# Patient Record
Sex: FEMALE | Race: BLACK OR AFRICAN AMERICAN | NOT HISPANIC OR LATINO | Employment: UNEMPLOYED | URBAN - METROPOLITAN AREA
[De-identification: names, ages, dates, MRNs, and addresses within clinical notes are randomized per-mention and may not be internally consistent; named-entity substitution may affect disease eponyms.]

---

## 2023-05-08 ENCOUNTER — LAB (OUTPATIENT)
Dept: LAB | Facility: LAB | Age: 43
End: 2023-05-08
Payer: COMMERCIAL

## 2023-05-08 ENCOUNTER — OFFICE VISIT (OUTPATIENT)
Dept: PRIMARY CARE | Facility: CLINIC | Age: 43
End: 2023-05-08
Payer: COMMERCIAL

## 2023-05-08 VITALS
HEIGHT: 69 IN | WEIGHT: 192 LBS | HEART RATE: 87 BPM | SYSTOLIC BLOOD PRESSURE: 132 MMHG | OXYGEN SATURATION: 100 % | BODY MASS INDEX: 28.44 KG/M2 | DIASTOLIC BLOOD PRESSURE: 94 MMHG | TEMPERATURE: 98 F

## 2023-05-08 DIAGNOSIS — M25.512 CHRONIC LEFT SHOULDER PAIN: ICD-10-CM

## 2023-05-08 DIAGNOSIS — E03.9 HYPOTHYROIDISM, UNSPECIFIED TYPE: ICD-10-CM

## 2023-05-08 DIAGNOSIS — I10 PRIMARY HYPERTENSION: Primary | ICD-10-CM

## 2023-05-08 DIAGNOSIS — R73.03 PREDIABETES: ICD-10-CM

## 2023-05-08 DIAGNOSIS — E03.8 OTHER SPECIFIED HYPOTHYROIDISM: ICD-10-CM

## 2023-05-08 DIAGNOSIS — J38.3 VOCAL CORD DYSFUNCTION: ICD-10-CM

## 2023-05-08 DIAGNOSIS — G89.29 CHRONIC LEFT SHOULDER PAIN: ICD-10-CM

## 2023-05-08 LAB
ALANINE AMINOTRANSFERASE (SGPT) (U/L) IN SER/PLAS: 10 U/L (ref 7–45)
ALBUMIN (G/DL) IN SER/PLAS: 4.7 G/DL (ref 3.4–5)
ALKALINE PHOSPHATASE (U/L) IN SER/PLAS: 109 U/L (ref 33–110)
ANION GAP IN SER/PLAS: 13 MMOL/L (ref 10–20)
ASPARTATE AMINOTRANSFERASE (SGOT) (U/L) IN SER/PLAS: 14 U/L (ref 9–39)
BILIRUBIN TOTAL (MG/DL) IN SER/PLAS: 0.2 MG/DL (ref 0–1.2)
CALCIUM (MG/DL) IN SER/PLAS: 9.7 MG/DL (ref 8.6–10.6)
CARBON DIOXIDE, TOTAL (MMOL/L) IN SER/PLAS: 29 MMOL/L (ref 21–32)
CHLORIDE (MMOL/L) IN SER/PLAS: 103 MMOL/L (ref 98–107)
CREATININE (MG/DL) IN SER/PLAS: 0.87 MG/DL (ref 0.5–1.05)
ESTIMATED AVERAGE GLUCOSE FOR HBA1C: 105 MG/DL
GFR FEMALE: 85 ML/MIN/1.73M2
GLUCOSE (MG/DL) IN SER/PLAS: 89 MG/DL (ref 74–99)
HEMOGLOBIN A1C/HEMOGLOBIN TOTAL IN BLOOD: 5.3 %
POTASSIUM (MMOL/L) IN SER/PLAS: 4.1 MMOL/L (ref 3.5–5.3)
PROTEIN TOTAL: 7.4 G/DL (ref 6.4–8.2)
SODIUM (MMOL/L) IN SER/PLAS: 141 MMOL/L (ref 136–145)
THYROTROPIN (MIU/L) IN SER/PLAS BY DETECTION LIMIT <= 0.05 MIU/L: 2.42 MIU/L (ref 0.44–3.98)
UREA NITROGEN (MG/DL) IN SER/PLAS: 12 MG/DL (ref 6–23)

## 2023-05-08 PROCEDURE — 3080F DIAST BP >= 90 MM HG: CPT | Performed by: STUDENT IN AN ORGANIZED HEALTH CARE EDUCATION/TRAINING PROGRAM

## 2023-05-08 PROCEDURE — 36415 COLL VENOUS BLD VENIPUNCTURE: CPT

## 2023-05-08 PROCEDURE — 84443 ASSAY THYROID STIM HORMONE: CPT

## 2023-05-08 PROCEDURE — 99214 OFFICE O/P EST MOD 30 MIN: CPT | Performed by: STUDENT IN AN ORGANIZED HEALTH CARE EDUCATION/TRAINING PROGRAM

## 2023-05-08 PROCEDURE — 83036 HEMOGLOBIN GLYCOSYLATED A1C: CPT

## 2023-05-08 PROCEDURE — 83718 ASSAY OF LIPOPROTEIN: CPT

## 2023-05-08 PROCEDURE — 82465 ASSAY BLD/SERUM CHOLESTEROL: CPT

## 2023-05-08 PROCEDURE — 3075F SYST BP GE 130 - 139MM HG: CPT | Performed by: STUDENT IN AN ORGANIZED HEALTH CARE EDUCATION/TRAINING PROGRAM

## 2023-05-08 PROCEDURE — 80053 COMPREHEN METABOLIC PANEL: CPT

## 2023-05-08 RX ORDER — ACETAMINOPHEN 500 MG
1000 TABLET ORAL EVERY 8 HOURS PRN
Qty: 360 TABLET | Refills: 3 | Status: SHIPPED | OUTPATIENT
Start: 2023-05-08 | End: 2023-08-11 | Stop reason: SDUPTHER

## 2023-05-08 RX ORDER — METHYLPHENIDATE HYDROCHLORIDE 18 MG/1
18 TABLET ORAL EVERY MORNING
COMMUNITY
Start: 2023-05-05 | End: 2023-08-11

## 2023-05-08 RX ORDER — MULTIVITAMIN WITH FOLIC ACID 400 MCG
1 TABLET ORAL DAILY
COMMUNITY
Start: 2023-02-06 | End: 2023-08-11 | Stop reason: SDUPTHER

## 2023-05-08 RX ORDER — OMEPRAZOLE 40 MG/1
40 CAPSULE, DELAYED RELEASE ORAL
COMMUNITY
End: 2023-08-11 | Stop reason: SDUPTHER

## 2023-05-08 RX ORDER — LURASIDONE HYDROCHLORIDE 80 MG/1
80 TABLET, FILM COATED ORAL
COMMUNITY
Start: 2023-05-05 | End: 2023-11-09 | Stop reason: WASHOUT

## 2023-05-08 RX ORDER — LAMOTRIGINE 200 MG/1
200 TABLET ORAL 2 TIMES DAILY
COMMUNITY
End: 2023-12-13 | Stop reason: SDUPTHER

## 2023-05-08 RX ORDER — BUPROPION HYDROCHLORIDE 300 MG/1
1 TABLET ORAL
COMMUNITY
Start: 2016-03-02 | End: 2023-11-15 | Stop reason: SDUPTHER

## 2023-05-08 RX ORDER — DICLOFENAC SODIUM 10 MG/G
4 GEL TOPICAL 4 TIMES DAILY
Qty: 450 G | Refills: 3 | Status: SHIPPED | OUTPATIENT
Start: 2023-05-08 | End: 2023-12-13 | Stop reason: WASHOUT

## 2023-05-08 RX ORDER — CLONAZEPAM 0.5 MG/1
0.5 TABLET ORAL DAILY
COMMUNITY
End: 2023-08-11

## 2023-05-08 RX ORDER — CHOLECALCIFEROL (VITAMIN D3) 125 MCG
1 CAPSULE ORAL DAILY
COMMUNITY
Start: 2018-08-02 | End: 2023-08-11 | Stop reason: SDUPTHER

## 2023-05-08 RX ORDER — BENZTROPINE MESYLATE 1 MG/1
1 TABLET ORAL 2 TIMES DAILY PRN
COMMUNITY
Start: 2019-06-17 | End: 2023-11-09 | Stop reason: WASHOUT

## 2023-05-08 RX ORDER — LEVOTHYROXINE SODIUM 150 UG/1
150 TABLET ORAL
COMMUNITY
End: 2023-09-14 | Stop reason: SDUPTHER

## 2023-05-08 RX ORDER — AMLODIPINE BESYLATE 10 MG/1
10 TABLET ORAL DAILY
COMMUNITY
End: 2023-07-20 | Stop reason: SDUPTHER

## 2023-05-08 ASSESSMENT — PAIN SCALES - GENERAL: PAINLEVEL: 0-NO PAIN

## 2023-05-12 LAB
CHOLESTEROL (MG/DL) IN SER/PLAS: 212 MG/DL (ref 0–199)
CHOLESTEROL IN HDL (MG/DL) IN SER/PLAS: 55.4 MG/DL
CHOLESTEROL/HDL RATIO: 3.8
NON-HDL CHOLESTEROL: 157 MG/DL

## 2023-05-12 NOTE — PROGRESS NOTES
I saw and evaluated the patient. I personally obtained the key and critical portions of the history and physical exam or was physically present for key and critical portions performed by the resident/fellow. I reviewed the resident/fellow's documentation and discussed the patient with the resident/fellow. I agree with the resident/fellow's medical decision making as documented in the note.    Juan M Penny MD

## 2023-05-12 NOTE — PROGRESS NOTES
"Subjective   Patient ID: December Danny is a 43 y.o. female who presents for Follow-up.    HPI     Ms. Delgado is a 43F with PMH of essential HTN, pre-diabetes, h/o thyroidectomy for malignancy with post-operative hypothyroidism, multiple psychiatric diagnoses, here for follow-up of HTN and L shoulder pain     # HTN  - /94 today (much better BP control than last visit)      - Has not been checking BP at home  - Tries to maintain healthy lifestyle but has several recent stressors (particularly son with behavioral problems at school)  - No headache, vision changes, dyspnea, edema     # L Shoulder Pain  - Several month aching pain, 6-8/10, constant pain from L shoulder radiating down  - Denies F,C,N,V,D,SOB,CP  - Nothing helps, diclofenac provides only mild relief     # GERD  - Improved with diet and lifestyle modifications and omeprazole PRN    Review of Systems   All other systems reviewed and are negative.      Objective   BP (!) 132/94 (BP Location: Right arm, Patient Position: Sitting)   Pulse 87   Temp 36.7 °C (98 °F)   Ht 1.74 m (5' 8.5\")   Wt 87.1 kg (192 lb)   SpO2 100%   BMI 28.77 kg/m²     Physical Exam  Constitutional:       Appearance: Normal appearance.   HENT:      Head: Normocephalic and atraumatic.      Nose: Nose normal.   Eyes:      Extraocular Movements: Extraocular movements intact.      Conjunctiva/sclera: Conjunctivae normal.   Cardiovascular:      Rate and Rhythm: Normal rate and regular rhythm.      Pulses: Normal pulses.      Heart sounds: Normal heart sounds.   Pulmonary:      Effort: Pulmonary effort is normal.      Breath sounds: Normal breath sounds.   Abdominal:      General: Abdomen is flat.      Palpations: Abdomen is soft.   Musculoskeletal:         General: Normal range of motion.      Cervical back: Normal range of motion and neck supple.   Skin:     General: Skin is warm and dry.   Neurological:      General: No focal deficit present.      Mental Status: She is alert and " oriented to person, place, and time. Mental status is at baseline.   Psychiatric:         Mood and Affect: Mood normal.         Behavior: Behavior normal.       Assessment/Plan     Ms. Delgado is a 44 yo F with PMH of essential HTN, pre-diabetes, h/o thyroidectomy for malignancy with post-operative hypothyroidism, multiple psychiatric diagnoses, here for follow-up of HTN and shoulder pain.     # HTN  - Continue amlodipine 10mg once daily  - CMP ordered today to monitor electrolytes and renal function     # L shoulder pain  - Shoulder x-ray ordered  - Refilled Tylenol extra strength 1000mg TID  - Refilled diclofenac 1% gel  - Referral ordered for Orthopedic Surgery     #GERD  - continue omeprazole for GERD     #Post-operative hypothyroidism  #Vocal cord dysfunction  - continue levothyroxine at current dose for now  - TSH with free T4 ordered today  - Referral to endocrinology for further management     #Vitamin D Deficiency  - continue vitamin D 5000IU    # Pre-diabetes  - HA1c 11/2021 at 5.2%  - Repeat HbA1C ordered today  - previously on metformin but has not been taking     #HM  - Non-fasting lipid panel ordered today     Plan to follow-up in 2-3 weeks     Patient seen and discussed with Dr. Zohaib Coley MD  Family and Preventive Medicine  PGY-3

## 2023-05-25 DIAGNOSIS — E78.5 HYPERLIPIDEMIA, UNSPECIFIED HYPERLIPIDEMIA TYPE: Primary | ICD-10-CM

## 2023-05-25 RX ORDER — ATORVASTATIN CALCIUM 40 MG/1
40 TABLET, FILM COATED ORAL DAILY
Qty: 90 TABLET | Refills: 3 | Status: SHIPPED | OUTPATIENT
Start: 2023-05-25 | End: 2024-05-24

## 2023-07-20 DIAGNOSIS — I10 PRIMARY HYPERTENSION: Primary | ICD-10-CM

## 2023-07-20 RX ORDER — AMLODIPINE BESYLATE 10 MG/1
10 TABLET ORAL DAILY
Qty: 30 TABLET | Refills: 0 | Status: SHIPPED | OUTPATIENT
Start: 2023-07-20 | End: 2023-09-05 | Stop reason: SDUPTHER

## 2023-08-19 PROBLEM — M25.512 CHRONIC LEFT SHOULDER PAIN: Status: ACTIVE | Noted: 2023-08-19

## 2023-08-19 PROBLEM — K21.9 GASTROESOPHAGEAL REFLUX DISEASE WITHOUT ESOPHAGITIS: Status: ACTIVE | Noted: 2023-08-19

## 2023-08-19 PROBLEM — E55.9 VITAMIN D DEFICIENCY: Status: ACTIVE | Noted: 2023-08-19

## 2023-08-19 PROBLEM — G89.29 CHRONIC LEFT SHOULDER PAIN: Status: ACTIVE | Noted: 2023-08-19

## 2023-08-19 PROBLEM — R09.89 CHEST CONGESTION: Status: ACTIVE | Noted: 2023-08-19

## 2023-09-05 DIAGNOSIS — I10 PRIMARY HYPERTENSION: ICD-10-CM

## 2023-09-13 RX ORDER — AMLODIPINE BESYLATE 10 MG/1
10 TABLET ORAL DAILY
Qty: 90 TABLET | Refills: 3 | Status: SHIPPED | OUTPATIENT
Start: 2023-09-13 | End: 2024-09-12

## 2023-09-13 NOTE — TELEPHONE ENCOUNTER
Rx Refill Request Telephone Encounter    Name:    :  [id number]  Medication Name:  Levothyroxine            Specific Pharmacy location:  [facility]  Date of last appointment:  2023  Date of next appointment:  10/27/2023  Best number to reach patient:  [phone]      Patient would like a call back

## 2023-09-14 DIAGNOSIS — E03.9 HYPOTHYROIDISM, UNSPECIFIED TYPE: Primary | ICD-10-CM

## 2023-09-14 RX ORDER — LEVOTHYROXINE SODIUM 150 UG/1
150 TABLET ORAL
Qty: 30 TABLET | Refills: 1 | Status: SHIPPED | OUTPATIENT
Start: 2023-09-14 | End: 2023-09-15 | Stop reason: SDUPTHER

## 2023-09-14 RX ORDER — LEVOTHYROXINE SODIUM 150 UG/1
150 TABLET ORAL
OUTPATIENT
Start: 2023-09-14

## 2023-09-14 NOTE — PROGRESS NOTES
Refilled synthroid 60 day supply. Needs more frequent follow up visits to work on her many chronic conditions.

## 2023-09-15 DIAGNOSIS — E03.9 HYPOTHYROIDISM, UNSPECIFIED TYPE: ICD-10-CM

## 2023-09-15 RX ORDER — LEVOTHYROXINE SODIUM 150 UG/1
150 TABLET ORAL
Qty: 30 TABLET | Refills: 1 | Status: SHIPPED | OUTPATIENT
Start: 2023-09-15 | End: 2023-11-13

## 2023-09-17 DIAGNOSIS — E03.9 HYPOTHYROIDISM, UNSPECIFIED TYPE: ICD-10-CM

## 2023-09-18 RX ORDER — LEVOTHYROXINE SODIUM 150 UG/1
TABLET ORAL
Qty: 90 TABLET | OUTPATIENT
Start: 2023-09-18

## 2023-09-28 ENCOUNTER — LAB (OUTPATIENT)
Dept: LAB | Facility: LAB | Age: 43
End: 2023-09-28
Payer: COMMERCIAL

## 2023-09-28 ENCOUNTER — OFFICE VISIT (OUTPATIENT)
Dept: PRIMARY CARE | Facility: CLINIC | Age: 43
End: 2023-09-28
Payer: COMMERCIAL

## 2023-09-28 VITALS
TEMPERATURE: 98.6 F | DIASTOLIC BLOOD PRESSURE: 86 MMHG | BODY MASS INDEX: 26.36 KG/M2 | SYSTOLIC BLOOD PRESSURE: 124 MMHG | HEIGHT: 69 IN | HEART RATE: 86 BPM | WEIGHT: 178 LBS | OXYGEN SATURATION: 98 %

## 2023-09-28 DIAGNOSIS — E03.9 HYPOTHYROIDISM, UNSPECIFIED TYPE: Primary | ICD-10-CM

## 2023-09-28 DIAGNOSIS — E03.9 HYPOTHYROIDISM, UNSPECIFIED TYPE: ICD-10-CM

## 2023-09-28 DIAGNOSIS — R42 DIZZINESS: ICD-10-CM

## 2023-09-28 DIAGNOSIS — M25.512 LEFT SHOULDER PAIN, UNSPECIFIED CHRONICITY: ICD-10-CM

## 2023-09-28 LAB
THYROTROPIN (MIU/L) IN SER/PLAS BY DETECTION LIMIT <= 0.05 MIU/L: 3.64 MIU/L (ref 0.44–3.98)
THYROXINE (T4) FREE (NG/DL) IN SER/PLAS: 0.86 NG/DL (ref 0.78–1.48)

## 2023-09-28 PROCEDURE — 36415 COLL VENOUS BLD VENIPUNCTURE: CPT

## 2023-09-28 PROCEDURE — 84443 ASSAY THYROID STIM HORMONE: CPT

## 2023-09-28 PROCEDURE — 99214 OFFICE O/P EST MOD 30 MIN: CPT

## 2023-09-28 PROCEDURE — 84439 ASSAY OF FREE THYROXINE: CPT

## 2023-09-28 RX ORDER — MULTIVITAMIN
1 TABLET ORAL DAILY
COMMUNITY
Start: 2023-08-17 | End: 2024-05-20 | Stop reason: ALTCHOICE

## 2023-09-28 RX ORDER — BETAMETHASONE DIPROPIONATE 0.5 MG/G
OINTMENT TOPICAL
COMMUNITY
Start: 2019-04-17 | End: 2024-05-20 | Stop reason: ALTCHOICE

## 2023-09-28 RX ORDER — LIDOCAINE 560 MG/1
PATCH PERCUTANEOUS; TOPICAL; TRANSDERMAL
COMMUNITY
Start: 2022-05-23 | End: 2024-05-20 | Stop reason: ALTCHOICE

## 2023-09-28 RX ORDER — MECLIZINE HCL 12.5 MG 12.5 MG/1
12.5 TABLET ORAL 3 TIMES DAILY PRN
Qty: 30 TABLET | Refills: 11 | Status: SHIPPED | OUTPATIENT
Start: 2023-09-28 | End: 2023-12-13 | Stop reason: WASHOUT

## 2023-09-28 RX ORDER — ONDANSETRON 4 MG/1
4 TABLET, ORALLY DISINTEGRATING ORAL EVERY 4 HOURS PRN
COMMUNITY
Start: 2015-09-04 | End: 2024-05-20 | Stop reason: ALTCHOICE

## 2023-09-28 RX ORDER — UREA 40 %
CREAM (GRAM) TOPICAL
COMMUNITY
Start: 2020-05-22 | End: 2024-05-20 | Stop reason: ALTCHOICE

## 2023-09-28 RX ORDER — DICYCLOMINE HYDROCHLORIDE 20 MG/1
20 TABLET ORAL 4 TIMES DAILY
COMMUNITY
Start: 2015-09-04 | End: 2024-01-29

## 2023-09-28 RX ORDER — LIDOCAINE 50 MG/G
1 PATCH TOPICAL DAILY
Qty: 30 PATCH | Refills: 11 | Status: SHIPPED | OUTPATIENT
Start: 2023-09-28 | End: 2023-12-13 | Stop reason: WASHOUT

## 2023-09-28 RX ORDER — CYCLOBENZAPRINE HCL 5 MG
1 TABLET ORAL EVERY 8 HOURS PRN
COMMUNITY
Start: 2022-09-30 | End: 2024-01-29

## 2023-09-28 RX ORDER — BUSPIRONE HYDROCHLORIDE 5 MG/1
5 TABLET ORAL 2 TIMES DAILY
COMMUNITY
Start: 2023-09-20 | End: 2023-10-07

## 2023-09-28 RX ORDER — FLUTICASONE PROPIONATE 50 MCG
2 SPRAY, SUSPENSION (ML) NASAL DAILY
COMMUNITY
Start: 2018-08-06 | End: 2024-05-20 | Stop reason: ALTCHOICE

## 2023-09-28 RX ORDER — PERPHENAZINE 4 MG/1
4 TABLET ORAL
COMMUNITY
End: 2023-11-09 | Stop reason: WASHOUT

## 2023-09-28 RX ORDER — MUPIROCIN 20 MG/G
OINTMENT TOPICAL DAILY
COMMUNITY
Start: 2022-10-09 | End: 2024-05-20 | Stop reason: ALTCHOICE

## 2023-09-28 ASSESSMENT — ENCOUNTER SYMPTOMS
WOUND: 0
CHILLS: 1
EYE DISCHARGE: 1
FEVER: 0
NECK STIFFNESS: 0
PALPITATIONS: 0
NERVOUS/ANXIOUS: 1
COUGH: 0
WHEEZING: 0
UNEXPECTED WEIGHT CHANGE: 1
DIFFICULTY URINATING: 0
ABDOMINAL PAIN: 0
AGITATION: 1
NECK PAIN: 0
ABDOMINAL DISTENTION: 0
FATIGUE: 1
DIARRHEA: 0
NAUSEA: 1
MYALGIAS: 1
CONSTIPATION: 1
EYE ITCHING: 1
HEADACHES: 1
SINUS PRESSURE: 0
DYSURIA: 0
DIZZINESS: 1
DIAPHORESIS: 0
EYE PAIN: 1
RHINORRHEA: 0
SINUS PAIN: 0
VOMITING: 1
LIGHT-HEADEDNESS: 1
SHORTNESS OF BREATH: 0
WEAKNESS: 0
HEMATURIA: 0
NUMBNESS: 0
CHEST TIGHTNESS: 1

## 2023-09-28 ASSESSMENT — PAIN SCALES - GENERAL: PAINLEVEL: 0-NO PAIN

## 2023-10-05 ENCOUNTER — TELEPHONE (OUTPATIENT)
Dept: OTHER | Age: 43
End: 2023-10-05
Payer: COMMERCIAL

## 2023-10-05 ENCOUNTER — TELEPHONE (OUTPATIENT)
Dept: BEHAVIORAL HEALTH | Facility: CLINIC | Age: 43
End: 2023-10-05
Payer: COMMERCIAL

## 2023-10-05 NOTE — PROGRESS NOTES
Patient called and reported had vertigo from dizziness , entered allergy in emr [facility] and removed med from list of medications anya [facility] cns     524 pm , spoke with patient and she says she stopped buspar after she started having dizziness and never resumed it , she still has episodes of dizziness . Discussed twice a week blood pressure monitoring before taking am meds and in the afternoon , discussed bupropion xl may cause elevated blood pressure . She denies having symptoms of illness currently. She is concerned about not wanting to reduce bupropion xl , however she had a period off meds , and we discussed she may need to stay at a lower dose , she will monitor this and for now will continue the same dose , she has been on this medication with no issues prior to now , but she understands this may need to be reduced . Also encouraged her to speak to the pcp office again to update them about the continued dizziness. [facility]

## 2023-10-06 ENCOUNTER — APPOINTMENT (OUTPATIENT)
Dept: PRIMARY CARE | Facility: CLINIC | Age: 43
End: 2023-10-06
Payer: COMMERCIAL

## 2023-10-07 NOTE — PROGRESS NOTES
Telephone note :Possible med reaction to buspar , patient reported dizziness and was evaluated in the er , reviewed notes in [facility] emr , encouraged follow up with pcp as she still has dizziness . She will go to the er if this worsens or other acute concerns develop . Notated dc buspar in chart .

## 2023-10-09 PROBLEM — F41.9 ANXIETY DISORDER: Status: ACTIVE | Noted: 2023-10-09

## 2023-10-09 PROBLEM — F41.1 GAD (GENERALIZED ANXIETY DISORDER): Status: ACTIVE | Noted: 2023-10-09

## 2023-10-09 PROBLEM — F43.9 TRAUMA AND STRESSOR-RELATED DISORDER: Status: ACTIVE | Noted: 2023-10-09

## 2023-10-09 PROBLEM — R07.9 CHEST PAIN: Status: ACTIVE | Noted: 2023-10-09

## 2023-10-09 PROBLEM — I10 HYPERTENSION, ESSENTIAL: Status: ACTIVE | Noted: 2023-10-09

## 2023-10-09 PROBLEM — R25.1 TREMOR: Status: ACTIVE | Noted: 2023-10-09

## 2023-10-09 PROBLEM — M77.41 METATARSALGIA OF BOTH FEET: Status: ACTIVE | Noted: 2023-10-09

## 2023-10-09 PROBLEM — R94.6 ABNORMAL THYROID FUNCTION TEST: Status: ACTIVE | Noted: 2023-10-09

## 2023-10-09 PROBLEM — F98.8 ADD (ATTENTION DEFICIT DISORDER): Status: ACTIVE | Noted: 2023-10-09

## 2023-10-09 PROBLEM — B35.1 ONYCHOMYCOSIS: Status: ACTIVE | Noted: 2023-10-09

## 2023-10-09 PROBLEM — L65.9 HAIR LOSS: Status: ACTIVE | Noted: 2023-10-09

## 2023-10-09 PROBLEM — M25.512 LEFT SHOULDER PAIN: Status: ACTIVE | Noted: 2023-10-09

## 2023-10-09 PROBLEM — R49.0 HOARSENESS OF VOICE: Status: ACTIVE | Noted: 2023-10-09

## 2023-10-09 PROBLEM — M77.42 METATARSALGIA OF BOTH FEET: Status: ACTIVE | Noted: 2023-10-09

## 2023-10-09 PROBLEM — L63.9 ALOPECIA AREATA, UNSPECIFIED: Status: ACTIVE | Noted: 2019-07-03

## 2023-10-09 PROBLEM — F32.A DEPRESSION: Status: ACTIVE | Noted: 2023-10-09

## 2023-10-09 PROBLEM — H53.9 VISION CHANGES: Status: ACTIVE | Noted: 2023-10-09

## 2023-10-09 PROBLEM — E08.00: Status: ACTIVE | Noted: 2023-10-09

## 2023-10-09 PROBLEM — L84 PRE-ULCERATIVE CALLUSES: Status: ACTIVE | Noted: 2023-10-09

## 2023-10-09 PROBLEM — E89.0 POSTOPERATIVE HYPOTHYROIDISM: Status: ACTIVE | Noted: 2023-10-09

## 2023-10-09 PROBLEM — L84 CALLUS OF FOOT: Status: ACTIVE | Noted: 2023-10-09

## 2023-10-09 PROBLEM — Z85.850 HISTORY OF MALIGNANT NEOPLASM OF THYROID: Status: ACTIVE | Noted: 2023-10-09

## 2023-10-09 PROBLEM — G47.9 SLEEP DIFFICULTIES: Status: ACTIVE | Noted: 2023-10-09

## 2023-10-09 PROBLEM — Z86.39: Status: ACTIVE | Noted: 2023-10-09

## 2023-10-09 PROBLEM — R53.83 FATIGUE: Status: ACTIVE | Noted: 2023-10-09

## 2023-10-09 RX ORDER — LURASIDONE HYDROCHLORIDE 40 MG/1
TABLET, FILM COATED ORAL
COMMUNITY
End: 2023-11-09 | Stop reason: WASHOUT

## 2023-10-09 RX ORDER — BENZTROPINE MESYLATE 0.5 MG/1
0.5 TABLET ORAL 2 TIMES DAILY
COMMUNITY
Start: 2019-09-13 | End: 2023-12-13 | Stop reason: SDUPTHER

## 2023-10-09 RX ORDER — BUPROPION HYDROCHLORIDE 150 MG/1
150 TABLET, EXTENDED RELEASE ORAL EVERY MORNING
COMMUNITY
End: 2023-11-15 | Stop reason: WASHOUT

## 2023-10-09 RX ORDER — LEVOTHYROXINE SODIUM 137 UG/1
TABLET ORAL
COMMUNITY
Start: 2019-08-21 | End: 2023-11-13 | Stop reason: SDUPTHER

## 2023-10-09 RX ORDER — LAMOTRIGINE 150 MG/1
TABLET ORAL
COMMUNITY
End: 2023-11-09 | Stop reason: WASHOUT

## 2023-10-09 RX ORDER — LEVOTHYROXINE SODIUM 125 UG/1
TABLET ORAL
COMMUNITY
End: 2023-11-13 | Stop reason: SDUPTHER

## 2023-10-09 RX ORDER — LURASIDONE HYDROCHLORIDE 120 MG/1
TABLET, FILM COATED ORAL
COMMUNITY
Start: 2023-02-24 | End: 2023-11-15 | Stop reason: SDUPTHER

## 2023-10-09 RX ORDER — LURASIDONE HYDROCHLORIDE 60 MG/1
TABLET, FILM COATED ORAL
COMMUNITY
Start: 2023-01-02 | End: 2023-11-09 | Stop reason: WASHOUT

## 2023-10-09 RX ORDER — CHLORHEXIDINE GLUCONATE ORAL RINSE 1.2 MG/ML
SOLUTION DENTAL
COMMUNITY
End: 2024-05-20 | Stop reason: ALTCHOICE

## 2023-10-09 RX ORDER — AMMONIUM LACTATE 12 G/100G
CREAM TOPICAL
COMMUNITY
End: 2024-05-20 | Stop reason: ALTCHOICE

## 2023-10-10 ENCOUNTER — TELEMEDICINE (OUTPATIENT)
Dept: BEHAVIORAL HEALTH | Facility: CLINIC | Age: 43
End: 2023-10-10
Payer: COMMERCIAL

## 2023-10-10 DIAGNOSIS — F90.0 ATTENTION DEFICIT HYPERACTIVITY DISORDER (ADHD), PREDOMINANTLY INATTENTIVE TYPE: ICD-10-CM

## 2023-10-10 DIAGNOSIS — F32.0 CURRENT MILD EPISODE OF MAJOR DEPRESSIVE DISORDER, UNSPECIFIED WHETHER RECURRENT (CMS-HCC): ICD-10-CM

## 2023-10-10 DIAGNOSIS — F25.0 SCHIZOAFFECTIVE DISORDER, BIPOLAR TYPE (MULTI): ICD-10-CM

## 2023-10-10 PROCEDURE — 90834 PSYTX W PT 45 MINUTES: CPT | Performed by: PSYCHOLOGIST

## 2023-10-11 NOTE — PROGRESS NOTES
"  1 PM [address] Ind[facility] Psych for Schizoaffective Dx, Depression, ADHD (45 MIN, 107-148)  Virtual. Spent most of time discussed her level of frustration. Prior to call, oldest son said f-you to her and said there were no groceries after she'd just got food, spending $300 on groceries. Has been having difficulty managing them. \"Tired of kids, tired of sitting around here, harder to keep the farzana, accept this reality.\" Furnace out, trying to get fixed but property mgt company slow. Trying to line up a therapist for oldest son. Also wants to do something with her life. Frustrated with Tavon's therapist who comes two times per week. \"She's puling me and oldest into these sessions.\" \"Trying to make us sit for two hours.\" Therapy goes until May, is not seeing it help make changes on son's behavior. Has asked about residential treatment at [facility], therapist says not eligible. Has inquired about putting him into a group home but again, told not eligible. Has had two calls from school over Tavon's behavior. Trying to involved him in artistic endeavors from [employer]. Believes he may be little calmer on current meds. Tavon, still having GI problems, with constipation, impacted then accidents. Plans on reaching out again to GI specialist. Daughter called and said she needed winter coat. Showed up and got but neer even said hello to her. Has had talk with her mother, daughter telling her mother she doesn't want birth control. Patient is upset with this. Said her daughter is \"slightly retarded\" and doesn't see her capable of making her own decision, doesn't want her to get pregnant, anticipating she'll become sexually active soon, \"will be disaster in the making.\" Frustrated with kids being \"disrespectful.\" Trying to tap into all community resources she can. Recently able to get some help from food bank. Is looking into services at [place] but Tavon, at 11, may be too old. Said he's an \"habitual liar.\" " Thinks oldest son has bipolar disorder. Oldest son is not med compliant. Is overwhelmed and feels she has no life of her own. Discussed importance of taking advantage of time away from kids. Considering part-time work. Next: 2 weeks.

## 2023-10-24 ENCOUNTER — TELEMEDICINE (OUTPATIENT)
Dept: BEHAVIORAL HEALTH | Facility: CLINIC | Age: 43
End: 2023-10-24
Payer: COMMERCIAL

## 2023-10-24 DIAGNOSIS — F25.0 SCHIZOAFFECTIVE DISORDER, BIPOLAR TYPE (MULTI): ICD-10-CM

## 2023-10-24 PROCEDURE — 90834 PSYTX W PT 45 MINUTES: CPT | Performed by: PSYCHOLOGIST

## 2023-10-24 NOTE — PROGRESS NOTES
1PM 71572 Ind[facility] Psych for Schizoaffective Dx, Bipolar Type (45 MIN, 106-155)  Virtual. Still trying to figure out My Chart/Epic virtual platform, as a result, did Zoom. Spent most of time discussing problems with her kids. Had two parent teacher conferences for sons. Frustrated with oldest son who's not doing work and lying to her about it. He has a 504 plan. Son, Tavon, getting less behavior concerns at school. Frustration with daughter who wants to get a tattoo, still at grandmother's. Patient's bf has broken down truck, relying on paying a friend to help. Needs to get meds refilled. Next: 2 weeks.

## 2023-10-25 ENCOUNTER — APPOINTMENT (OUTPATIENT)
Dept: BEHAVIORAL HEALTH | Facility: CLINIC | Age: 43
End: 2023-10-25
Payer: COMMERCIAL

## 2023-10-25 ENCOUNTER — TELEMEDICINE (OUTPATIENT)
Dept: BEHAVIORAL HEALTH | Facility: CLINIC | Age: 43
End: 2023-10-25
Payer: COMMERCIAL

## 2023-10-27 ENCOUNTER — APPOINTMENT (OUTPATIENT)
Dept: PRIMARY CARE | Facility: CLINIC | Age: 43
End: 2023-10-27
Payer: COMMERCIAL

## 2023-11-07 ENCOUNTER — TELEMEDICINE (OUTPATIENT)
Dept: BEHAVIORAL HEALTH | Facility: CLINIC | Age: 43
End: 2023-11-07
Payer: COMMERCIAL

## 2023-11-07 DIAGNOSIS — F25.0 SCHIZOAFFECTIVE DISORDER, BIPOLAR TYPE (MULTI): ICD-10-CM

## 2023-11-07 PROCEDURE — 90834 PSYTX W PT 45 MINUTES: CPT | Performed by: PSYCHOLOGIST

## 2023-11-07 NOTE — PROGRESS NOTES
"3PM [address]9086 Indiv Psych for Schizoaffective Dx, Bipolar Type (45 MIN, [phone])  Virtual. Feeling better overall, more in control self and mood better. Still, plans on talking to pharmacologist and \"tweaking\" meds. Youngest son, not getting negative behavior reports from school. Home therapist, \"happy, happy\", continues to come twice per week, will be ongoing through May, 2024. Little communication with daughter, who's staying with her mom. Frustrated with daughter. Found that her mother was in the hospital 1.5 weeks but she's unsure of the reasons. Wants to try and stay active this fall/winter, \"bad part of year for me\", seasonal component to mood. Next: 3 weeks.   "

## 2023-11-09 ENCOUNTER — TELEMEDICINE (OUTPATIENT)
Dept: BEHAVIORAL HEALTH | Facility: CLINIC | Age: 43
End: 2023-11-09
Payer: COMMERCIAL

## 2023-11-09 DIAGNOSIS — F33.1 DEPRESSION, MAJOR, RECURRENT, MODERATE (MULTI): ICD-10-CM

## 2023-11-09 PROCEDURE — 99213 OFFICE O/P EST LOW 20 MIN: CPT | Performed by: CLINICAL NURSE SPECIALIST

## 2023-11-09 NOTE — PROGRESS NOTES
Outpatient Psychiatry      Subjective   December Mack, a 43 y.o. female,presents for follow up for outpatient psychiatry /medication management appointment as an established patient     Diagnosis:   Schizoaffective disorder (295.70) (F25.9) primary diagnoses   Sleep disturbances (780.50) (G47.9)  Trauma and stressor-related disorder   ESTUARDO (generalized anxiety disorder)   Add     Patient Active Problem List   Diagnosis    Chest congestion    Gastroesophageal reflux disease without esophagitis    Vitamin D deficiency    Chronic left shoulder pain    Abnormal thyroid function test    ADD (attention deficit disorder)    Anxiety disorder    Callus of foot    Depression    Diabetes mellitus due to underlying condition with hyperosmolarity without coma (CMS/HCC)    Hair loss    Alopecia areata, unspecified    Asthma    Chest pain    Episodic mood disorder (CMS/HCC)    Fatigue    History of metabolic syndrome    Hoarseness of voice    Hx of thyroid cancer    Hypertension, essential    Mediastinal lymphadenopathy    Metatarsalgia of both feet    Onychomycosis    Postoperative hypothyroidism    Schizoaffective disorder (CMS/HCC)    Sleep difficulties    Substance-related disorder (CMS/HCC)    Thyroid nodule    Tobacco use disorder    Vision changes    Left shoulder pain    Dental caries    Depressive disorder    ESTUARDO (generalized anxiety disorder)    History of malignant neoplasm of thyroid    Pre-ulcerative calluses    Trauma and stressor-related disorder    Tremor       Treatment Goals:  Specify outcomes written in observable, behavioral terms:   Anxiety: reducing negative automatic thoughts and reducing physical symptoms of anxiety  Depression: increasing energy, increasing interest in usual activities, increasing motivation, and reducing fatigue  Mood stability : maintain mood stability     Treatment Plan/Recommendations:    4 weeks follow up , can continue self care and wellness efforts and maintain routine health  screenings.can call  for treatment and scheduling concerns.  Follow-up plan was discussed with patient.    Review with patient: Treatment plan reviewed with the patient.  Medication risks/benefit reviewed with the patient    HPI:  discussed medications , she says she still does not need to resume the methylphenidate , feels more relaxed overall , and she stopped clonazepam , she does not want to provide a urine sample for this per protocol   denies issues with substance abuse   no thoughts of harming herself or others   reconciled and reviewed medication list in the [facility] and provided psychoeducation   support provided  reviewed and reconciled medication list in the [facility]   no safety issues at home , she spoke about stressors and intends to continue therapy   says mood can get anxious and depressed over stressors , and she says she has a lot of stressors , support provided   she has been missing doses of her psych meds , reviewed importance of compliance and med psychoeducation, encouraged her to prioritize her health and self care , reconciled and reviewed med list and discussed the process for restarting medication   she intends to continue therapy     Review of Systems     Depressive Symptoms: depressed /irritable mood, diminished interest, fatigue, poor concentration, but no change in appetite, no recent lb weight gain, no recent lb weight loss, no insomnia, not feeling worthless or guilty, ability to make decisions, no suicidal ideation, no guns or weapons in household.   Manic Symptoms: mood is not irritable or elevated, self esteem is not grandiose or increased, no changes in need for sleep, not more talkative than usual, does not have flight of ideas or racing thoughts, no distractibility, no psychomotor agitation or increased goal-directed activity, no excessive involvement in pleasurable activities.   Psychotic Symptoms: no hallucinations, no delusions, no disorganized speech, does not  have disorganized behavior or catatonia, no negative symptoms.   Anxiety Symptoms: difficulty controlling worry, increased arousal, restlessness / feeling on edge due to worry, but no panic attacks, no concerns about future panic attacks, no worry about panic attack consequences, no change in behavior due to panic attacks, no excessive worry, not easily fatigued due to worry, no difficulty concentrating due to worry, no irritability due to worry, no muscle tension due to worry, no sleep disturbances due to worry, no specific phobia, no social phobia, no obsessions, no compulsions, no exposure to traumatic event, no re-experiencing of traumatic event, no avoidance of stimuli and number of responsiveness.   Delirium/ Altered Mental Status Symptoms: no disturbances of consciousness, no diminished ability to focus, sustain, shift attention, no change in cognition or perceptual disturbances, symptoms do not fluctuate during the course of the day, general medical condition is not present.   Disordered Eating Symptoms: weight is not less than 85% of ideal body weight, no intense fear of gaining weight, does not have a poor body image, no restricting of diet and/or excessive exercise, no purging or laxative use.   Post-traumatic stress disorder symptoms The patient is currently asymptomatic.   Inattentive Symptoms: does not make careless mistakes often, does not have difficulty paying attention, not often disorganized, does not lose things often, is not easily distracted, is not often forgetful, does not avoid/dislike tasks with sustained mental effort, listens when spoken to directly, is able to follow instructions and finish schoolwork.   Conduct Issues: no aggression towards people or animals, no destruction of property, no deceitfulness, does not violate rules.   Other Symptoms/ Concerns: no symptoms of separation anxiety, no reactive attachment symptoms, no motor tics, no vocal tics, no stuttering, no phonological  problems, no loss of urine control, no encopresis, no intellectual disability, no self-injurious behaviors, not somatic and no conversion symptoms, no gender identity symptoms, no sleep disorder symptoms, no impulse control symptoms, no personality disorder symptoms.       Constitutional: no sleep apnea, normal sleeping, no night wakings, no snoring, not a picky eater, normal appetite, no swallowing problems, no night terrors, no nightmares, no restless sleep, no snorts/gasps and no obesity.   Eyes: no vision test, no vision impairment, does not wear glasses/contacts, does not wear glassess/contacts and no blindness.   ENT: no hearing tested, no hearing loss, no hearing aid, no cochlear implant, no excessive drooling, no dental problems and no recurrent strep throat.   Cardiovascular: no murmur, no heart defect, no chest pain, no palpitations and no syncope.   Respiratory: no wheezing and no asthma/reactive airway disease.   Gastrointestinal: no constipation, no abdominal pain, no nausea, no vomiting, no diarrhea, no blood in stools, no g-tube and no reflux.   Genitourinary: no nocturnal enuresis, no diurnal enuresis and no incontinence.   Musculoskeletal: normal gait and no torticollis, but moving all extremities well and symmetrical, no arthritis or joint problems, no myalgias, no muscle weakness and normal hand preference.   Integumentary: no changes in moles or birthmarks, no rashes and no atopic dermatitis.   Neurological: no symmetrical facies, no headache, no head injury, no seizures, no staring spells, no loss of consciousness, no meningitis/encephalitis, no cerebral palsy, no spina bifida, no stereotypy, no developmental regression and no tics or twitches.   Endocrine: no temperature intolerance, , good growth and no failure to thrive.   Hematologic/Lymphatic: no anemia and no lead poisoning.    Current Medications:    Current Outpatient Medications:     acetaminophen (Tylenol Extra Strength) 500 mg tablet,  Take 2 tablets (1,000 mg) by mouth every 8 hours if needed for mild pain (1 - 3)., Disp: 360 tablet, Rfl: 3    amLODIPine (Norvasc) 10 mg tablet, Take 1 tablet (10 mg) by mouth once daily., Disp: 90 tablet, Rfl: 3    ammonium lactate (Amlactin) 12 % cream, , Disp: , Rfl:     atorvastatin (Lipitor) 40 mg tablet, Take 1 tablet (40 mg) by mouth once daily. For your elevated cholesterol levels., Disp: 90 tablet, Rfl: 3    benztropine (Cogentin) 0.5 mg tablet, Take 1 tablet (0.5 mg) by mouth twice a day., Disp: , Rfl:     benztropine (Cogentin) 1 mg tablet, Take 1 tablet (1 mg) by mouth 2 times a day as needed for tremors., Disp: , Rfl:     betamethasone dipropionate (Diprolene) 0.05 % ointment, 1 Application, Disp: , Rfl:     buPROPion SR (Wellbutrin SR) 150 mg 12 hr tablet, Take 1 tablet (150 mg) by mouth once daily in the morning. Take along with 300mg, xl daily, Disp: , Rfl:     buPROPion XL (Wellbutrin XL) 300 mg 24 hr tablet, Take 1 tablet (300 mg) by mouth once daily in the morning. Take before meals., Disp: , Rfl:     cariprazine (Vraylar) 1.5 mg capsule, , Disp: , Rfl:     chlorhexidine (Peridex) 0.12 % solution, Rinse mouth with 15 ML for 30 seconds and spit out after tooth brush ( refer to prescription notes), Disp: , Rfl:     cholecalciferol (Vitamin D-3) 125 MCG (5000 UT) capsule, Take 1 capsule (125 mcg) by mouth once daily., Disp: 90 capsule, Rfl: 3    cyclobenzaprine (Flexeril) 5 mg tablet, Take 1 tablet (5 mg) by mouth every 8 hours if needed. No further refills until does shoulder Xray, Disp: , Rfl:     Daily-Jarocho, with folic acid, 400 mcg tablet, Take 1 tablet by mouth once daily., Disp: 90 each, Rfl: 3    diclofenac sodium (Voltaren) 1 % gel gel, Apply 1 Application topically 4 times a day. (Patient taking differently: Apply 1 Application topically 4 times a day. Apply sparingly to affected area three times a day as needed for pain), Disp: 450 g, Rfl: 3    dicyclomine (Bentyl) 20 mg tablet, Take 1  tablet (20 mg) by mouth 4 times a day., Disp: , Rfl:     fluticasone (Flonase) 50 mcg/actuation nasal spray, Administer 2 sprays into each nostril once daily., Disp: , Rfl:     lamoTRIgine (LaMICtal) 150 mg tablet, TAKE 1 TABLET BY MOYTH EVERY MORNING FOR MOOD. THIS IS IN ADDITION  MG IN THE EVENING, Disp: , Rfl:     lamoTRIgine (LaMICtal) 200 mg tablet, Take 1 tablet (200 mg) by mouth 2 times a day., Disp: , Rfl:     Latuda 120 mg tablet, TAKE ONE TABLET BY MOUTH WITH FOOD ONCE DAILY, Disp: , Rfl:     Latuda 40 mg tablet, take 1 tablet by mouth once daily every evening with food, Disp: , Rfl:     Latuda 60 mg tablet, take 1 tablet by mouth once daily with food (AT LEAST 350 CALORIES), Disp: , Rfl:     Latuda 80 mg tablet, Take 1 tablet (80 mg) by mouth once daily with a meal., Disp: , Rfl:     levothyroxine (Synthroid, Levoxyl) 125 mcg tablet, Take 125 mcg by mouth daily before breakfast., Disp: , Rfl:     levothyroxine (Synthroid, Levoxyl) 137 mcg tablet, , Disp: , Rfl:     levothyroxine (Synthroid, Levoxyl) 150 mcg tablet, Take 1 tablet (150 mcg) by mouth once daily in the morning. Take before meals., Disp: 30 tablet, Rfl: 1    lidocaine (Lidoderm) 5 % patch, Place 1 patch over 12 hours on the skin once daily. Apply to painful area 12 hours per day, remove for 12 hours., Disp: 30 patch, Rfl: 11    lidocaine 4 % patch, APPLY ONE PATCH ON PAINFUL AREA  FOR 12 HOURS EACH DAY, THEN REMOVE FOR 12 HOURS, Disp: , Rfl:     meclizine (Antivert) 12.5 mg tablet, Take 1 tablet (12.5 mg) by mouth 3 times a day as needed for dizziness., Disp: 30 tablet, Rfl: 11    multivitamin tablet, Take 1 tablet by mouth once daily., Disp: , Rfl:     mupirocin (Bactroban) 2 % ointment, Apply topically once daily. to affected area, Disp: , Rfl:     omeprazole (PriLOSEC) 40 mg DR capsule, Take 1 capsule (40 mg) by mouth once daily in the morning. Take before meals., Disp: 90 capsule, Rfl: 3    ondansetron ODT (Zofran-ODT) 4 mg  disintegrating tablet, Take 1 tablet (4 mg) by mouth every 4 hours if needed., Disp: , Rfl:     perphenazine 4 mg tablet, Take 1 tablet (4 mg) by mouth., Disp: , Rfl:     urea (Carmol) 40 % cream, APPLY AND RUB IN A THIN FILM TO AFFECTED AREA(S) TO BOTH FEET DAILY.AS NEEDED, Disp: , Rfl:   Medical History:  Past Medical History:   Diagnosis Date    Acute upper respiratory infection, unspecified 12/08/2016    URI, acute    Cannabis abuse, uncomplicated     Marijuana abuse, continuous    Encounter for general adult medical examination without abnormal findings 08/12/2018    Routine history and physical examination of adult    Encounter for screening for respiratory tuberculosis 12/08/2016    Tuberculosis screening    Other abnormality of red blood cells 07/15/2019    Microcytosis    Other psychoactive substance use, unspecified with unspecified psychoactive substance-induced disorder (CMS/HCC)     Substance-related disorder    Pain in right knee 01/11/2018    Bilateral knee pain    Personal history of malignant neoplasm of thyroid     History of thyroid cancer    Personal history of malignant neoplasm of thyroid 10/14/2021    History of malignant neoplasm of thyroid    Personal history of malignant neoplasm of thyroid 12/15/2016    History of thyroid cancer    Personal history of other diseases of the nervous system and sense organs 03/23/2016    History of seizure disorder    Personal history of other diseases of the respiratory system     History of allergic rhinitis    Personal history of other diseases of the respiratory system 08/06/2018    History of allergic rhinitis    Personal history of other drug therapy 12/08/2016    History of influenza vaccination    Personal history of other endocrine, nutritional and metabolic disease 08/06/2018    History of hypercalcemia    Personal history of other endocrine, nutritional and metabolic disease 08/02/2018    History of vitamin D deficiency    Personal history of other  infectious and parasitic diseases 08/20/2016    History of onychomycosis    Personal history of other mental and behavioral disorders     History of affective disorder    Personal history of other specified conditions 07/12/2021    History of dysuria    Personal history of other specified conditions 04/13/2018    History of fatigue    Vitamin D deficiency, unspecified     Vitamin D deficiency     Surgical History:  Past Surgical History:   Procedure Laterality Date    THYROID SURGERY  08/19/2016    Thyroid Surgery     Family History:  Family History   Problem Relation Name Age of Onset    Other (Malignant neoplasm of breast) Other Aunt     Cancer Other Aunt     Diabetes Other Uncle      Social History:  Social History     Socioeconomic History    Marital status: Single     Spouse name: Not on file    Number of children: Not on file    Years of education: Not on file    Highest education level: Not on file   Occupational History    Not on file   Tobacco Use    Smoking status: Every Day     Types: Cigarettes    Smokeless tobacco: Never   Substance and Sexual Activity    Alcohol use: Yes    Drug use: Never    Sexual activity: Not on file   Other Topics Concern    Not on file   Social History Narrative    Not on file     Social Determinants of Health     Financial Resource Strain: Not on file   Food Insecurity: Not on file   Transportation Needs: Not on file   Physical Activity: Not on file   Stress: Not on file   Social Connections: Not on file   Intimate Partner Violence: Not on file   Housing Stability: Not on file     Record Review: brief      Vitals:  There were no vitals filed for this visit.    Reba Stauffer, APRN-CNS

## 2023-11-11 DIAGNOSIS — E03.9 HYPOTHYROIDISM, UNSPECIFIED TYPE: ICD-10-CM

## 2023-11-13 ENCOUNTER — TELEPHONE (OUTPATIENT)
Dept: OTHER | Age: 43
End: 2023-11-13
Payer: COMMERCIAL

## 2023-11-13 RX ORDER — LEVOTHYROXINE SODIUM 150 UG/1
150 TABLET ORAL DAILY
Qty: 90 TABLET | Refills: 1 | Status: SHIPPED | OUTPATIENT
Start: 2023-11-13 | End: 2024-11-12

## 2023-11-13 NOTE — TELEPHONE ENCOUNTER
Caller: pt    Medication:  Latuda 120 mgs  Bupropion  mgs    Ph[facility]: [facility], [phone]    Last visit: 11/9/23  Next visit: 12/13/23

## 2023-11-15 ENCOUNTER — TELEPHONE (OUTPATIENT)
Dept: OTHER | Age: 43
End: 2023-11-15
Payer: COMMERCIAL

## 2023-11-15 RX ORDER — BUPROPION HYDROCHLORIDE 150 MG/1
150 TABLET ORAL DAILY
Qty: 30 TABLET | Refills: 0 | Status: SHIPPED | OUTPATIENT
Start: 2023-11-15 | End: 2023-12-13 | Stop reason: SDUPTHER

## 2023-11-15 RX ORDER — LURASIDONE HYDROCHLORIDE 120 MG/1
120 TABLET, FILM COATED ORAL EVERY EVENING
Qty: 30 TABLET | Refills: 0 | Status: SHIPPED | OUTPATIENT
Start: 2023-11-15 | End: 2023-12-13 | Stop reason: SDUPTHER

## 2023-11-15 RX ORDER — BUPROPION HYDROCHLORIDE 300 MG/1
300 TABLET ORAL
Qty: 30 TABLET | Refills: 0 | Status: SHIPPED | OUTPATIENT
Start: 2023-11-15 | End: 2023-12-13 | Stop reason: SDUPTHER

## 2023-11-15 NOTE — TELEPHONE ENCOUNTER
callER: pt    Needs medication filled ASAP    Buproprion & Latuda    [facility], [facility]  Next visit: 12/13/23

## 2023-11-21 ENCOUNTER — APPOINTMENT (OUTPATIENT)
Dept: BEHAVIORAL HEALTH | Facility: CLINIC | Age: 43
End: 2023-11-21
Payer: COMMERCIAL

## 2023-11-28 ENCOUNTER — TELEMEDICINE (OUTPATIENT)
Dept: BEHAVIORAL HEALTH | Facility: CLINIC | Age: 43
End: 2023-11-28
Payer: COMMERCIAL

## 2023-11-28 DIAGNOSIS — F25.0 SCHIZOAFFECTIVE DISORDER, BIPOLAR TYPE (MULTI): ICD-10-CM

## 2023-11-28 PROCEDURE — 90834 PSYTX W PT 45 MINUTES: CPT | Performed by: PSYCHOLOGIST
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
## 2023-12-05 ENCOUNTER — TELEMEDICINE (OUTPATIENT)
Dept: BEHAVIORAL HEALTH | Facility: CLINIC | Age: 43
End: 2023-12-05
Payer: COMMERCIAL

## 2023-12-05 DIAGNOSIS — F25.0 SCHIZOAFFECTIVE DISORDER, BIPOLAR TYPE (MULTI): ICD-10-CM

## 2023-12-05 PROCEDURE — 90837 PSYTX W PT 60 MINUTES: CPT | Performed by: PSYCHOLOGIST
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
## 2023-12-13 ENCOUNTER — TELEMEDICINE (OUTPATIENT)
Dept: BEHAVIORAL HEALTH | Facility: CLINIC | Age: 43
End: 2023-12-13
Payer: COMMERCIAL

## 2023-12-13 DIAGNOSIS — R29.818 EXTRAPYRAMIDAL SYMPTOM: ICD-10-CM

## 2023-12-13 DIAGNOSIS — F25.8 OTHER SCHIZOAFFECTIVE DISORDERS (MULTI): ICD-10-CM

## 2023-12-13 DIAGNOSIS — F33.1 DEPRESSION, MAJOR, RECURRENT, MODERATE (MULTI): ICD-10-CM

## 2023-12-13 DIAGNOSIS — F41.1 GAD (GENERALIZED ANXIETY DISORDER): ICD-10-CM

## 2023-12-13 PROCEDURE — 99213 OFFICE O/P EST LOW 20 MIN: CPT | Performed by: CLINICAL NURSE SPECIALIST

## 2023-12-13 RX ORDER — BENZTROPINE MESYLATE 0.5 MG/1
0.5 TABLET ORAL 2 TIMES DAILY
Qty: 180 TABLET | Refills: 0 | Status: SHIPPED | OUTPATIENT
Start: 2023-12-13 | End: 2024-01-17 | Stop reason: WASHOUT

## 2023-12-13 RX ORDER — LURASIDONE HYDROCHLORIDE 120 MG/1
120 TABLET, FILM COATED ORAL EVERY EVENING
Qty: 90 TABLET | Refills: 0 | Status: SHIPPED | OUTPATIENT
Start: 2023-12-13 | End: 2024-01-17 | Stop reason: WASHOUT

## 2023-12-13 RX ORDER — BUPROPION HYDROCHLORIDE 150 MG/1
150 TABLET ORAL DAILY
Qty: 90 TABLET | Refills: 0 | Status: SHIPPED | OUTPATIENT
Start: 2023-12-13 | End: 2024-01-17 | Stop reason: SDUPTHER

## 2023-12-13 RX ORDER — BUPROPION HYDROCHLORIDE 300 MG/1
300 TABLET ORAL
Qty: 90 TABLET | Refills: 0 | Status: SHIPPED | OUTPATIENT
Start: 2023-12-13 | End: 2024-01-17 | Stop reason: SDUPTHER

## 2023-12-13 RX ORDER — HYDROXYZINE HYDROCHLORIDE 10 MG/1
10 TABLET, FILM COATED ORAL DAILY PRN
Qty: 30 TABLET | Refills: 1 | Status: SHIPPED | OUTPATIENT
Start: 2023-12-13 | End: 2024-01-17 | Stop reason: SDUPTHER

## 2023-12-13 RX ORDER — LAMOTRIGINE 200 MG/1
200 TABLET ORAL 2 TIMES DAILY
Qty: 180 TABLET | Refills: 0 | Status: SHIPPED | OUTPATIENT
Start: 2023-12-13 | End: 2024-01-17 | Stop reason: SDUPTHER

## 2023-12-13 NOTE — PROGRESS NOTES
Outpatient Psychiatry      Subjective   December Mack, a 43 y.o. female, presents for follow up for outpatient psychiatry /medication management appointment as an established patient      Diagnosis: Schizoaffective disorder (295.70) (F25.9) primary diagnoses   Sleep disturbances (780.50) (G47.9)  Trauma and stressor-related disorder   ESTUARDO (generalized anxiety disorder)   Add       Patient Active Problem List   Diagnosis    Chest congestion    Gastroesophageal reflux disease without esophagitis    Vitamin D deficiency    Chronic left shoulder pain    Abnormal thyroid function test    ADD (attention deficit disorder)    Anxiety disorder    Callus of foot    Depression    Diabetes mellitus due to underlying condition with hyperosmolarity without coma (CMS/HCC)    Hair loss    Alopecia areata, unspecified    Asthma    Chest pain    Episodic mood disorder (CMS/HCC)    Fatigue    History of metabolic syndrome    Hoarseness of voice    Hx of thyroid cancer    Hypertension, essential    Mediastinal lymphadenopathy    Metatarsalgia of both feet    Onychomycosis    Postoperative hypothyroidism    Schizoaffective disorder (CMS/HCC)    Sleep difficulties    Substance-related disorder (CMS/HCC)    Thyroid nodule    Tobacco use disorder    Vision changes    Left shoulder pain    Dental caries    Depressive disorder    ESTUARDO (generalized anxiety disorder)    History of malignant neoplasm of thyroid    Pre-ulcerative calluses    Trauma and stressor-related disorder    Tremor       Treatment Goals:  Specify outcomes written in observable, behavioral terms:     Anxiety: reducing negative automatic thoughts and reducing physical symptoms of anxiety  Depression: increasing energy, increasing interest in usual activities, increasing motivation, and reducing fatigue  Mood stability : maintain mood stability     Treatment Plan/Recommendations: 4-6 weeks follow up , can continue self care and wellness efforts and maintain routine health  screenings.can call  for treatment and scheduling concerns.  Follow-up plan was discussed with patient.  Follow-up plan for depression was discussed with patient.    Review with patient: Treatment plan reviewed with the patient.  Medication risks/benefit reviewed with the patient    HPI:denies issues with substance abuse   no thoughts of harming herself or others   reconciled and reviewed medication list in the [facility] emr and provided psychoeducation   support provided  reviewed and reconciled medication list in the [facility] emr   no safety issues at home , she spoke about stressors and intends to continue therapy   says mood can get anxious and depressed over stressors , and she says she has a lot of stressors , support provided   Has been better with med compliance     Current Outpatient Medications:     acetaminophen (Tylenol Extra Strength) 500 mg tablet, Take 2 tablets (1,000 mg) by mouth every 8 hours if needed for mild pain (1 - 3)., Disp: 360 tablet, Rfl: 3    amLODIPine (Norvasc) 10 mg tablet, Take 1 tablet (10 mg) by mouth once daily., Disp: 90 tablet, Rfl: 3    ammonium lactate (Amlactin) 12 % cream, , Disp: , Rfl:     atorvastatin (Lipitor) 40 mg tablet, Take 1 tablet (40 mg) by mouth once daily. For your elevated cholesterol levels., Disp: 90 tablet, Rfl: 3    benztropine (Cogentin) 0.5 mg tablet, Take 1 tablet (0.5 mg) by mouth twice a day., Disp: , Rfl:     betamethasone dipropionate (Diprolene) 0.05 % ointment, 1 Application, Disp: , Rfl:     buPROPion XL (Wellbutrin XL) 150 mg 24 hr tablet, Take 1 tablet (150 mg) by mouth once daily. Do not crush, chew, or split., Disp: 30 tablet, Rfl: 0    buPROPion XL (Wellbutrin XL) 300 mg 24 hr tablet, Take 1 tablet (300 mg) by mouth once daily in the morning. Take before meals., Disp: 30 tablet, Rfl: 0    chlorhexidine (Peridex) 0.12 % solution, Rinse mouth with 15 ML for 30 seconds and spit out after tooth brush ( refer to prescription notes), Disp: ,  Rfl:     cholecalciferol (Vitamin D-3) 125 MCG (5000 UT) capsule, Take 1 capsule (125 mcg) by mouth once daily., Disp: 90 capsule, Rfl: 3    cyclobenzaprine (Flexeril) 5 mg tablet, Take 1 tablet (5 mg) by mouth every 8 hours if needed. No further refills until does shoulder Xray, Disp: , Rfl:     Daily-Jarocho, with folic acid, 400 mcg tablet, Take 1 tablet by mouth once daily., Disp: 90 each, Rfl: 3    diclofenac sodium (Voltaren) 1 % gel gel, Apply 1 Application topically 4 times a day. (Patient taking differently: Apply 1 Application topically 4 times a day. Apply sparingly to affected area three times a day as needed for pain), Disp: 450 g, Rfl: 3    dicyclomine (Bentyl) 20 mg tablet, Take 1 tablet (20 mg) by mouth 4 times a day., Disp: , Rfl:     fluticasone (Flonase) 50 mcg/actuation nasal spray, Administer 2 sprays into each nostril once daily., Disp: , Rfl:     lamoTRIgine (LaMICtal) 200 mg tablet, Take 1 tablet (200 mg) by mouth 2 times a day., Disp: , Rfl:     levothyroxine (Synthroid, Levoxyl) 150 mcg tablet, Take 1 tablet (150 mcg) by mouth once daily., Disp: 90 tablet, Rfl: 1    lidocaine (Lidoderm) 5 % patch, Place 1 patch over 12 hours on the skin once daily. Apply to painful area 12 hours per day, remove for 12 hours., Disp: 30 patch, Rfl: 11    lidocaine 4 % patch, APPLY ONE PATCH ON PAINFUL AREA  FOR 12 HOURS EACH DAY, THEN REMOVE FOR 12 HOURS, Disp: , Rfl:     lurasidone (Latuda) 120 mg tablet, Take 1 tablet (120 mg) by mouth once daily in the evening., Disp: 30 tablet, Rfl: 0    meclizine (Antivert) 12.5 mg tablet, Take 1 tablet (12.5 mg) by mouth 3 times a day as needed for dizziness., Disp: 30 tablet, Rfl: 11    multivitamin tablet, Take 1 tablet by mouth once daily., Disp: , Rfl:     mupirocin (Bactroban) 2 % ointment, Apply topically once daily. to affected area, Disp: , Rfl:     omeprazole (PriLOSEC) 40 mg DR capsule, Take 1 capsule (40 mg) by mouth once daily in the morning. Take before  meals., Disp: 90 capsule, Rfl: 3    ondansetron ODT (Zofran-ODT) 4 mg disintegrating tablet, Take 1 tablet (4 mg) by mouth every 4 hours if needed., Disp: , Rfl:     urea (Carmol) 40 % cream, APPLY AND RUB IN A THIN FILM TO AFFECTED AREA(S) TO BOTH FEET DAILY.AS NEEDED, Disp: , Rfl:   Medical History:  Past Medical History:   Diagnosis Date    Acute upper respiratory infection, unspecified 12/08/2016    URI, acute    Cannabis abuse, uncomplicated     Marijuana abuse, continuous    Encounter for general adult medical examination without abnormal findings 08/12/2018    Routine history and physical examination of adult    Encounter for screening for respiratory tuberculosis 12/08/2016    Tuberculosis screening    Other abnormality of red blood cells 07/15/2019    Microcytosis    Other psychoactive substance use, unspecified with unspecified psychoactive substance-induced disorder (CMS/HCC)     Substance-related disorder    Pain in right knee 01/11/2018    Bilateral knee pain    Personal history of malignant neoplasm of thyroid     History of thyroid cancer    Personal history of malignant neoplasm of thyroid 10/14/2021    History of malignant neoplasm of thyroid    Personal history of malignant neoplasm of thyroid 12/15/2016    History of thyroid cancer    Personal history of other diseases of the nervous system and sense organs 03/23/2016    History of seizure disorder    Personal history of other diseases of the respiratory system     History of allergic rhinitis    Personal history of other diseases of the respiratory system 08/06/2018    History of allergic rhinitis    Personal history of other drug therapy 12/08/2016    History of influenza vaccination    Personal history of other endocrine, nutritional and metabolic disease 08/06/2018    History of hypercalcemia    Personal history of other endocrine, nutritional and metabolic disease 08/02/2018    History of vitamin D deficiency    Personal history of other  infectious and parasitic diseases 08/20/2016    History of onychomycosis    Personal history of other mental and behavioral disorders     History of affective disorder    Personal history of other specified conditions 07/12/2021    History of dysuria    Personal history of other specified conditions 04/13/2018    History of fatigue    Vitamin D deficiency, unspecified     Vitamin D deficiency     Surgical History:  Past Surgical History:   Procedure Laterality Date    THYROID SURGERY  08/19/2016    Thyroid Surgery     Family History:  Family History   Problem Relation Name Age of Onset    Other (Malignant neoplasm of breast) Other Aunt     Cancer Other Aunt     Diabetes Other Uncle      Social History:  Social History     Socioeconomic History    Marital status: Single     Spouse name: Not on file    Number of children: Not on file    Years of education: Not on file    Highest education level: Not on file   Occupational History    Not on file   Tobacco Use    Smoking status: Every Day     Types: Cigarettes    Smokeless tobacco: Never   Substance and Sexual Activity    Alcohol use: Yes    Drug use: Never    Sexual activity: Not on file   Other Topics Concern    Not on file   Social History Narrative    Not on file     Social Determinants of Health     Financial Resource Strain: Not on file   Food Insecurity: Not on file   Transportation Needs: Not on file   Physical Activity: Not on file   Stress: Not on file   Social Connections: Not on file   Intimate Partner Violence: Not on file   Housing Stability: Not on file     Record Review: brief     Vitals:  There were no vitals filed for this visit.    Reba Stauffer, APRN-CNS

## 2023-12-18 DIAGNOSIS — K21.9 GASTROESOPHAGEAL REFLUX DISEASE WITHOUT ESOPHAGITIS: ICD-10-CM

## 2023-12-18 RX ORDER — OMEPRAZOLE 40 MG/1
40 CAPSULE, DELAYED RELEASE ORAL
Qty: 90 CAPSULE | Refills: 3 | Status: SHIPPED | OUTPATIENT
Start: 2023-12-18 | End: 2023-12-19 | Stop reason: SDUPTHER

## 2023-12-18 NOTE — TELEPHONE ENCOUNTER
[facility] pharm rep called requesting refill of pt omeprazole 40mg. Order pended and routed to provider.

## 2023-12-19 DIAGNOSIS — K21.9 GASTROESOPHAGEAL REFLUX DISEASE WITHOUT ESOPHAGITIS: ICD-10-CM

## 2023-12-19 RX ORDER — OMEPRAZOLE 40 MG/1
40 CAPSULE, DELAYED RELEASE ORAL
Qty: 90 CAPSULE | Refills: 3 | Status: SHIPPED | OUTPATIENT
Start: 2023-12-19 | End: 2024-12-18

## 2024-01-09 ENCOUNTER — TELEMEDICINE (OUTPATIENT)
Dept: BEHAVIORAL HEALTH | Facility: CLINIC | Age: 44
End: 2024-01-09
Payer: COMMERCIAL

## 2024-01-09 DIAGNOSIS — F25.0 SCHIZOAFFECTIVE DISORDER, BIPOLAR TYPE WITH GOOD PROGNOSTIC FEATURES (MULTI): ICD-10-CM

## 2024-01-09 PROCEDURE — 90834 PSYTX W PT 45 MINUTES: CPT | Performed by: PSYCHOLOGIST

## 2024-01-10 NOTE — PROGRESS NOTES
"4 PM 69902 Indiv Psych for Schizoaffective Dx, Bipolar Type (45 MIN, [id number])  Virtual. Supportive Therapy. Reported some panic attacks and anger. Discussed some issues with bf regarding him being available to her. Some anger directed at kids. Often tries to hide feelings from kids, not wanting them to see her react strongly emotionally. Some concerns with oldest son viewing pornography. Still issues regarding daughter being sexually involved. Mentioned PTSD issues today. Feels communication in relationship with bf (13 years her senior, 56) not good. Tells herself, \"one day I'll be free of these issues. Kids will be grown.\" Sees bf as egotistical, \"vain.\" Getting food from food drives with neighbor. Still difficulty with transportation, having to rely on others. Discussed possibility of her working to get 's license which she tried before, taking driving course. Youngest son now involved in karate. Next: 2 weeks.   "

## 2024-01-17 ENCOUNTER — TELEMEDICINE (OUTPATIENT)
Dept: BEHAVIORAL HEALTH | Facility: CLINIC | Age: 44
End: 2024-01-17
Payer: COMMERCIAL

## 2024-01-17 DIAGNOSIS — F31.9 BIPOLAR DEPRESSION (MULTI): ICD-10-CM

## 2024-01-17 DIAGNOSIS — F33.1 DEPRESSION, MAJOR, RECURRENT, MODERATE (MULTI): ICD-10-CM

## 2024-01-17 DIAGNOSIS — F41.1 GAD (GENERALIZED ANXIETY DISORDER): ICD-10-CM

## 2024-01-17 PROCEDURE — 99213 OFFICE O/P EST LOW 20 MIN: CPT | Performed by: CLINICAL NURSE SPECIALIST

## 2024-01-17 RX ORDER — CICLOPIROX 80 MG/ML
SOLUTION TOPICAL
COMMUNITY
Start: 2020-05-22 | End: 2024-05-20 | Stop reason: ALTCHOICE

## 2024-01-17 RX ORDER — TOPIRAMATE 25 MG/1
TABLET ORAL
COMMUNITY
Start: 2021-08-12 | End: 2024-03-13 | Stop reason: WASHOUT

## 2024-01-17 RX ORDER — HYDROXYZINE HYDROCHLORIDE 10 MG/1
10 TABLET, FILM COATED ORAL DAILY PRN
Qty: 30 TABLET | Refills: 0 | Status: SHIPPED | OUTPATIENT
Start: 2024-01-17 | End: 2024-02-14 | Stop reason: WASHOUT

## 2024-01-17 RX ORDER — BUPROPION HYDROCHLORIDE 300 MG/1
300 TABLET ORAL
Qty: 90 TABLET | Refills: 0 | Status: SHIPPED | OUTPATIENT
Start: 2024-01-17 | End: 2024-02-14 | Stop reason: SDUPTHER

## 2024-01-17 RX ORDER — BUSPIRONE HYDROCHLORIDE 5 MG/1
TABLET ORAL EVERY 12 HOURS
COMMUNITY
Start: 2023-08-11 | End: 2024-01-17 | Stop reason: WASHOUT

## 2024-01-17 RX ORDER — IBUPROFEN 200 MG
TABLET ORAL
COMMUNITY
Start: 2020-01-29 | End: 2024-05-20 | Stop reason: ALTCHOICE

## 2024-01-17 RX ORDER — BUPROPION HYDROCHLORIDE 150 MG/1
150 TABLET ORAL DAILY
Qty: 90 TABLET | Refills: 0 | Status: SHIPPED | OUTPATIENT
Start: 2024-01-17 | End: 2024-02-14 | Stop reason: SDUPTHER

## 2024-01-17 RX ORDER — MECLIZINE HCL 12.5 MG 12.5 MG/1
12.5 TABLET ORAL 3 TIMES DAILY PRN
COMMUNITY
Start: 2023-09-28 | End: 2024-05-20 | Stop reason: ALTCHOICE

## 2024-01-17 RX ORDER — IBUPROFEN 800 MG/1
TABLET ORAL
COMMUNITY
Start: 2022-06-15

## 2024-01-17 RX ORDER — LUMATEPERONE 10.5 MG/1
10.5 CAPSULE ORAL DAILY
Qty: 30 CAPSULE | Refills: 1 | Status: SHIPPED | OUTPATIENT
Start: 2024-01-17 | End: 2024-03-13 | Stop reason: WASHOUT

## 2024-01-17 RX ORDER — METFORMIN HYDROCHLORIDE 500 MG/1
TABLET ORAL
COMMUNITY
Start: 2020-01-29 | End: 2024-05-20 | Stop reason: ALTCHOICE

## 2024-01-17 RX ORDER — LAMOTRIGINE 200 MG/1
200 TABLET ORAL 2 TIMES DAILY
Qty: 180 TABLET | Refills: 0 | Status: SHIPPED | OUTPATIENT
Start: 2024-01-17 | End: 2024-02-14 | Stop reason: SDUPTHER

## 2024-01-17 RX ORDER — LORATADINE 10 MG/1
TABLET ORAL
COMMUNITY
Start: 2018-08-06

## 2024-01-17 RX ORDER — LUMATEPERONE 10.5 MG/1
CAPSULE ORAL
COMMUNITY
Start: 2022-08-29 | End: 2024-04-10 | Stop reason: WASHOUT

## 2024-01-17 NOTE — PROGRESS NOTES
Outpatient Psychiatry      Subjective   December Mack, a 43 y.o. female, presents for follow up for outpatient psychiatry /medication management appointment as an established patient         Diagnosis:   Schizoaffective disorder (295.70) (F25.9) primary diagnoses   Sleep disturbances (780.50) (G47.9)  Trauma and stressor-related disorder   ESTUARDO (generalized anxiety disorder)   Add       Patient Active Problem List   Diagnosis    Chest congestion    Gastroesophageal reflux disease without esophagitis    Vitamin D deficiency    Chronic left shoulder pain    Abnormal thyroid function test    ADD (attention deficit disorder)    Anxiety disorder    Callus of foot    Depression    Diabetes mellitus due to underlying condition with hyperosmolarity without coma (CMS/HCC)    Hair loss    Alopecia areata, unspecified    Asthma    Chest pain    Episodic mood disorder (CMS/HCC)    Fatigue    History of metabolic syndrome    Hoarseness of voice    Hx of thyroid cancer    Hypertension, essential    Mediastinal lymphadenopathy    Metatarsalgia of both feet    Onychomycosis    Postoperative hypothyroidism    Schizoaffective disorder (CMS/HCC)    Sleep difficulties    Substance-related disorder (CMS/HCC)    Thyroid nodule    Tobacco use disorder    Vision changes    Left shoulder pain    Dental caries    Depressive disorder    ESTUARDO (generalized anxiety disorder)    History of malignant neoplasm of thyroid    Pre-ulcerative calluses    Trauma and stressor-related disorder    Tremor     Treatment Goals:  Specify outcomes written in observable, behavioral terms:   Maintain stability of mental health and adhere to treatment     Treatment Plan/Recommendations: 4-6 weeks follow up , can continue self care and wellness efforts and maintain routine health screenings.can call  for treatment and scheduling concerns.   Follow-up plan was discussed with patient.      Review with patient: Treatment plan reviewed with the  patient.  Medication risks/benefit reviewed with the patient    HPI:denies issues with substance abuse   no thoughts of harming herself or others   reconciled and reviewed medication list in the [facility] emr and provided psychoeducation   support provided  reviewed and reconciled medication list in the [facility] emr   no safety issues at home , she spoke about stressors and intends to continue therapy   says mood can get anxious and depressed over stressors , and she says she has a lot of stressors , support provided   Continues to work on  med compliance     Current Medications:    Current Outpatient Medications:     busPIRone (Buspar) 5 mg tablet, Take by mouth every 12 hours., Disp: , Rfl:     ciclopirox (Penlac) 8 % solution, Apply topically., Disp: , Rfl:     ibuprofen 800 mg tablet, Take by mouth., Disp: , Rfl:     loratadine (Claritin) 10 mg tablet, Take by mouth., Disp: , Rfl:     lumateperone (Caplyta) 10.5 mg capsule, Take by mouth., Disp: , Rfl:     meclizine (Antivert) 12.5 mg tablet, Take 1 tablet (12.5 mg) by mouth 3 times a day as needed., Disp: , Rfl:     metFORMIN (Glucophage) 500 mg tablet, Take by mouth., Disp: , Rfl:     multivitamin (MULTIPLE VITAMINS ORAL), Take by mouth., Disp: , Rfl:     nicotine (Nicoderm CQ) 21 mg/24 hr patch, Place on the skin once daily., Disp: , Rfl:     topiramate (Topamax) 25 mg tablet, Take by mouth., Disp: , Rfl:     amLODIPine (Norvasc) 10 mg tablet, Take 1 tablet (10 mg) by mouth once daily., Disp: 90 tablet, Rfl: 3    ammonium lactate (Amlactin) 12 % cream, , Disp: , Rfl:     atorvastatin (Lipitor) 40 mg tablet, Take 1 tablet (40 mg) by mouth once daily. For your elevated cholesterol levels., Disp: 90 tablet, Rfl: 3    benztropine (Cogentin) 0.5 mg tablet, Take 1 tablet (0.5 mg) by mouth 2 times a day., Disp: 180 tablet, Rfl: 0    betamethasone dipropionate (Diprolene) 0.05 % ointment, 1 Application, Disp: , Rfl:     buPROPion XL (Wellbutrin XL) 150 mg 24 hr tablet, Take  1 tablet (150 mg) by mouth once daily. Do not crush, chew, or split., Disp: 90 tablet, Rfl: 0    buPROPion XL (Wellbutrin XL) 300 mg 24 hr tablet, Take 1 tablet (300 mg) by mouth once daily in the morning. Take before meals., Disp: 90 tablet, Rfl: 0    chlorhexidine (Peridex) 0.12 % solution, Rinse mouth with 15 ML for 30 seconds and spit out after tooth brush ( refer to prescription notes), Disp: , Rfl:     cholecalciferol (Vitamin D-3) 125 MCG (5000 UT) capsule, Take 1 capsule (125 mcg) by mouth once daily., Disp: 90 capsule, Rfl: 3    cyclobenzaprine (Flexeril) 5 mg tablet, Take 1 tablet (5 mg) by mouth every 8 hours if needed. No further refills until does shoulder Xray, Disp: , Rfl:     Daily-Jarocho, with folic acid, 400 mcg tablet, Take 1 tablet by mouth once daily., Disp: 90 each, Rfl: 3    dicyclomine (Bentyl) 20 mg tablet, Take 1 tablet (20 mg) by mouth 4 times a day., Disp: , Rfl:     fluticasone (Flonase) 50 mcg/actuation nasal spray, Administer 2 sprays into each nostril once daily., Disp: , Rfl:     hydrOXYzine HCL (Atarax) 10 mg tablet, Take 1 tablet (10 mg) by mouth once daily as needed for anxiety., Disp: 30 tablet, Rfl: 1    lamoTRIgine (LaMICtal) 200 mg tablet, Take 1 tablet (200 mg) by mouth 2 times a day., Disp: 180 tablet, Rfl: 0    levothyroxine (Synthroid, Levoxyl) 150 mcg tablet, Take 1 tablet (150 mcg) by mouth once daily., Disp: 90 tablet, Rfl: 1    lidocaine 4 % patch, APPLY ONE PATCH ON PAINFUL AREA  FOR 12 HOURS EACH DAY, THEN REMOVE FOR 12 HOURS, Disp: , Rfl:     lurasidone (Latuda) 120 mg tablet, Take 1 tablet (120 mg) by mouth once daily in the evening., Disp: 90 tablet, Rfl: 0    multivitamin tablet, Take 1 tablet by mouth once daily., Disp: , Rfl:     mupirocin (Bactroban) 2 % ointment, Apply topically once daily. to affected area, Disp: , Rfl:     omeprazole (PriLOSEC) 40 mg DR capsule, Take 1 capsule (40 mg) by mouth once daily in the morning. Take before meals., Disp: 90 capsule,  Rfl: 3    ondansetron ODT (Zofran-ODT) 4 mg disintegrating tablet, Take 1 tablet (4 mg) by mouth every 4 hours if needed., Disp: , Rfl:     urea (Carmol) 40 % cream, APPLY AND RUB IN A THIN FILM TO AFFECTED AREA(S) TO BOTH FEET DAILY.AS NEEDED, Disp: , Rfl:   Medical History:  Past Medical History:   Diagnosis Date    Acute upper respiratory infection, unspecified 12/08/2016    URI, acute    Cannabis abuse, uncomplicated     Marijuana abuse, continuous    Encounter for general adult medical examination without abnormal findings 08/12/2018    Routine history and physical examination of adult    Encounter for screening for respiratory tuberculosis 12/08/2016    Tuberculosis screening    Other abnormality of red blood cells 07/15/2019    Microcytosis    Other psychoactive substance use, unspecified with unspecified psychoactive substance-induced disorder (CMS/HCC)     Substance-related disorder    Pain in right knee 01/11/2018    Bilateral knee pain    Personal history of malignant neoplasm of thyroid     History of thyroid cancer    Personal history of malignant neoplasm of thyroid 10/14/2021    History of malignant neoplasm of thyroid    Personal history of malignant neoplasm of thyroid 12/15/2016    History of thyroid cancer    Personal history of other diseases of the nervous system and sense organs 03/23/2016    History of seizure disorder    Personal history of other diseases of the respiratory system     History of allergic rhinitis    Personal history of other diseases of the respiratory system 08/06/2018    History of allergic rhinitis    Personal history of other drug therapy 12/08/2016    History of influenza vaccination    Personal history of other endocrine, nutritional and metabolic disease 08/06/2018    History of hypercalcemia    Personal history of other endocrine, nutritional and metabolic disease 08/02/2018    History of vitamin D deficiency    Personal history of other infectious and parasitic  diseases 08/20/2016    History of onychomycosis    Personal history of other mental and behavioral disorders     History of affective disorder    Personal history of other specified conditions 07/12/2021    History of dysuria    Personal history of other specified conditions 04/13/2018    History of fatigue    Vitamin D deficiency, unspecified     Vitamin D deficiency     Surgical History:  Past Surgical History:   Procedure Laterality Date    THYROID SURGERY  08/19/2016    Thyroid Surgery     Family History:  Family History   Problem Relation Name Age of Onset    Other (Malignant neoplasm of breast) Other Aunt     Cancer Other Aunt     Diabetes Other Uncle      Social History:  Social History     Socioeconomic History    Marital status: Single     Spouse name: Not on file    Number of children: Not on file    Years of education: Not on file    Highest education level: Not on file   Occupational History    Not on file   Tobacco Use    Smoking status: Every Day     Types: Cigarettes    Smokeless tobacco: Never   Substance and Sexual Activity    Alcohol use: Yes    Drug use: Never    Sexual activity: Not on file   Other Topics Concern    Not on file   Social History Narrative    Not on file     Social Determinants of Health     Financial Resource Strain: Not on file   Food Insecurity: Not on file   Transportation Needs: Not on file   Physical Activity: Not on file   Stress: Not on file   Social Connections: Not on file   Intimate Partner Violence: Not on file   Housing Stability: Not on file     Record Review: brief     Vitals:  There were no vitals filed for this visit.    Reba Stauffer, APRN-CNS

## 2024-01-22 ENCOUNTER — TELEMEDICINE (OUTPATIENT)
Dept: BEHAVIORAL HEALTH | Facility: CLINIC | Age: 44
End: 2024-01-22
Payer: COMMERCIAL

## 2024-01-22 DIAGNOSIS — F25.0 SCHIZOAFFECTIVE DISORDER, BIPOLAR TYPE (MULTI): ICD-10-CM

## 2024-01-22 PROCEDURE — 90834 PSYTX W PT 45 MINUTES: CPT | Performed by: PSYCHOLOGIST

## 2024-01-22 NOTE — PROGRESS NOTES
"11 AM 13536 Indiv Psych for Schizoaffective Dx, Bipolar Type (45 MIN, 2993-2709)  Virtual. Supportive Therapy. Not doing well, frustrated. Trying new med Caplyta but can't get insurance approval. Frustrated with sister over how handling niece, Margaret (5). Expecting patient and others to care for daughter but patient not interested/willing to help at this time. \"Don't want to watch her anymore.\" Sister taking job at [employer] that will include 6 weeks of training. Still having difficulty with oldest son, not taking his meds (prozac, topirimate), wants him to be more responsible. Youngest son, difficulties getting school to provide expectations from IEP. \"He doesn't understand what's going on in class, zones out.\" Doesn't think in home therapy twice per week is helping him. Next: 1 week.   "

## 2024-01-29 ENCOUNTER — TELEMEDICINE (OUTPATIENT)
Dept: BEHAVIORAL HEALTH | Facility: CLINIC | Age: 44
End: 2024-01-29
Payer: COMMERCIAL

## 2024-01-29 ENCOUNTER — OFFICE VISIT (OUTPATIENT)
Dept: PRIMARY CARE | Facility: CLINIC | Age: 44
End: 2024-01-29
Payer: COMMERCIAL

## 2024-01-29 ENCOUNTER — LAB (OUTPATIENT)
Dept: LAB | Facility: LAB | Age: 44
End: 2024-01-29
Payer: COMMERCIAL

## 2024-01-29 VITALS
SYSTOLIC BLOOD PRESSURE: 134 MMHG | TEMPERATURE: 97.8 F | DIASTOLIC BLOOD PRESSURE: 88 MMHG | BODY MASS INDEX: 28.33 KG/M2 | HEART RATE: 87 BPM | OXYGEN SATURATION: 99 % | WEIGHT: 189.1 LBS

## 2024-01-29 DIAGNOSIS — D64.9 ANEMIA, UNSPECIFIED TYPE: ICD-10-CM

## 2024-01-29 DIAGNOSIS — E55.9 VITAMIN D DEFICIENCY, UNSPECIFIED: ICD-10-CM

## 2024-01-29 DIAGNOSIS — R79.89 OTHER SPECIFIED ABNORMAL FINDINGS OF BLOOD CHEMISTRY: ICD-10-CM

## 2024-01-29 DIAGNOSIS — F25.0 SCHIZOAFFECTIVE DISORDER, BIPOLAR TYPE (MULTI): ICD-10-CM

## 2024-01-29 DIAGNOSIS — R53.83 FATIGUE, UNSPECIFIED TYPE: ICD-10-CM

## 2024-01-29 DIAGNOSIS — R53.83 FATIGUE, UNSPECIFIED TYPE: Primary | ICD-10-CM

## 2024-01-29 DIAGNOSIS — R06.83 SNORING: ICD-10-CM

## 2024-01-29 DIAGNOSIS — J98.8 CONGESTION OF UPPER AIRWAY: ICD-10-CM

## 2024-01-29 LAB
25(OH)D3 SERPL-MCNC: 81 NG/ML (ref 30–100)
ALBUMIN SERPL BCP-MCNC: 4.6 G/DL (ref 3.4–5)
ALP SERPL-CCNC: 99 U/L (ref 33–110)
ALT SERPL W P-5'-P-CCNC: 8 U/L (ref 7–45)
ANION GAP SERPL CALC-SCNC: 11 MMOL/L (ref 10–20)
APPEARANCE UR: ABNORMAL
AST SERPL W P-5'-P-CCNC: 11 U/L (ref 9–39)
BASOPHILS # BLD AUTO: 0.03 X10*3/UL (ref 0–0.1)
BASOPHILS NFR BLD AUTO: 0.5 %
BILIRUB SERPL-MCNC: 0.3 MG/DL (ref 0–1.2)
BILIRUB UR STRIP.AUTO-MCNC: NEGATIVE MG/DL
BUN SERPL-MCNC: 9 MG/DL (ref 6–23)
CALCIUM SERPL-MCNC: 9.8 MG/DL (ref 8.6–10.6)
CHLORIDE SERPL-SCNC: 105 MMOL/L (ref 98–107)
CO2 SERPL-SCNC: 30 MMOL/L (ref 21–32)
COLOR UR: ABNORMAL
CREAT SERPL-MCNC: 0.86 MG/DL (ref 0.5–1.05)
CREAT UR-MCNC: 174.2 MG/DL (ref 20–320)
CRP SERPL-MCNC: 0.53 MG/DL
EGFRCR SERPLBLD CKD-EPI 2021: 86 ML/MIN/1.73M*2
EOSINOPHIL # BLD AUTO: 0.11 X10*3/UL (ref 0–0.7)
EOSINOPHIL NFR BLD AUTO: 1.9 %
ERYTHROCYTE [DISTWIDTH] IN BLOOD BY AUTOMATED COUNT: 14.7 % (ref 11.5–14.5)
ERYTHROCYTE [SEDIMENTATION RATE] IN BLOOD BY WESTERGREN METHOD: 18 MM/H (ref 0–20)
GLUCOSE SERPL-MCNC: 65 MG/DL (ref 74–99)
GLUCOSE UR STRIP.AUTO-MCNC: NEGATIVE MG/DL
HCT VFR BLD AUTO: 35.5 % (ref 36–46)
HGB BLD-MCNC: 11.7 G/DL (ref 12–16)
IMM GRANULOCYTES # BLD AUTO: 0.01 X10*3/UL (ref 0–0.7)
IMM GRANULOCYTES NFR BLD AUTO: 0.2 % (ref 0–0.9)
KETONES UR STRIP.AUTO-MCNC: NEGATIVE MG/DL
LEUKOCYTE ESTERASE UR QL STRIP.AUTO: ABNORMAL
LYMPHOCYTES # BLD AUTO: 2.3 X10*3/UL (ref 1.2–4.8)
LYMPHOCYTES NFR BLD AUTO: 39.2 %
MAGNESIUM SERPL-MCNC: 2.34 MG/DL (ref 1.6–2.4)
MCH RBC QN AUTO: 24.6 PG (ref 26–34)
MCHC RBC AUTO-ENTMCNC: 33 G/DL (ref 32–36)
MCV RBC AUTO: 75 FL (ref 80–100)
MICROALBUMIN UR-MCNC: 9.4 MG/L
MICROALBUMIN/CREAT UR: 5.4 UG/MG CREAT
MONOCYTES # BLD AUTO: 0.35 X10*3/UL (ref 0.1–1)
MONOCYTES NFR BLD AUTO: 6 %
MUCOUS THREADS #/AREA URNS AUTO: NORMAL /LPF
NEUTROPHILS # BLD AUTO: 3.06 X10*3/UL (ref 1.2–7.7)
NEUTROPHILS NFR BLD AUTO: 52.2 %
NITRITE UR QL STRIP.AUTO: NEGATIVE
NRBC BLD-RTO: 0 /100 WBCS (ref 0–0)
PH UR STRIP.AUTO: 6 [PH]
PLATELET # BLD AUTO: 306 X10*3/UL (ref 150–450)
POTASSIUM SERPL-SCNC: 4.3 MMOL/L (ref 3.5–5.3)
PROT SERPL-MCNC: 7.1 G/DL (ref 6.4–8.2)
PROT UR STRIP.AUTO-MCNC: NEGATIVE MG/DL
RBC # BLD AUTO: 4.75 X10*6/UL (ref 4–5.2)
RBC # UR STRIP.AUTO: NEGATIVE /UL
RBC #/AREA URNS AUTO: NORMAL /HPF
SODIUM SERPL-SCNC: 142 MMOL/L (ref 136–145)
SP GR UR STRIP.AUTO: 1.02
SQUAMOUS #/AREA URNS AUTO: NORMAL /HPF
TSH SERPL-ACNC: 3.64 MIU/L (ref 0.44–3.98)
UROBILINOGEN UR STRIP.AUTO-MCNC: 4 MG/DL
VIT B12 SERPL-MCNC: 358 PG/ML (ref 211–911)
WBC # BLD AUTO: 5.9 X10*3/UL (ref 4.4–11.3)
WBC #/AREA URNS AUTO: NORMAL /HPF

## 2024-01-29 PROCEDURE — 82043 UR ALBUMIN QUANTITATIVE: CPT

## 2024-01-29 PROCEDURE — 3061F NEG MICROALBUMINURIA REV: CPT

## 2024-01-29 PROCEDURE — 83550 IRON BINDING TEST: CPT

## 2024-01-29 PROCEDURE — 87086 URINE CULTURE/COLONY COUNT: CPT

## 2024-01-29 PROCEDURE — 85025 COMPLETE CBC W/AUTO DIFF WBC: CPT

## 2024-01-29 PROCEDURE — 85652 RBC SED RATE AUTOMATED: CPT

## 2024-01-29 PROCEDURE — 82306 VITAMIN D 25 HYDROXY: CPT

## 2024-01-29 PROCEDURE — 82728 ASSAY OF FERRITIN: CPT

## 2024-01-29 PROCEDURE — 82570 ASSAY OF URINE CREATININE: CPT

## 2024-01-29 PROCEDURE — 86140 C-REACTIVE PROTEIN: CPT

## 2024-01-29 PROCEDURE — 99214 OFFICE O/P EST MOD 30 MIN: CPT

## 2024-01-29 PROCEDURE — 82607 VITAMIN B-12: CPT

## 2024-01-29 PROCEDURE — 83540 ASSAY OF IRON: CPT

## 2024-01-29 PROCEDURE — 81001 URINALYSIS AUTO W/SCOPE: CPT

## 2024-01-29 PROCEDURE — 3075F SYST BP GE 130 - 139MM HG: CPT

## 2024-01-29 PROCEDURE — 86038 ANTINUCLEAR ANTIBODIES: CPT

## 2024-01-29 PROCEDURE — 3079F DIAST BP 80-89 MM HG: CPT

## 2024-01-29 PROCEDURE — 90837 PSYTX W PT 60 MINUTES: CPT | Performed by: PSYCHOLOGIST

## 2024-01-29 PROCEDURE — 83735 ASSAY OF MAGNESIUM: CPT

## 2024-01-29 PROCEDURE — 84443 ASSAY THYROID STIM HORMONE: CPT

## 2024-01-29 PROCEDURE — 80053 COMPREHEN METABOLIC PANEL: CPT

## 2024-01-29 PROCEDURE — 36415 COLL VENOUS BLD VENIPUNCTURE: CPT

## 2024-01-29 RX ORDER — GUAIFENESIN 600 MG/1
600 TABLET, EXTENDED RELEASE ORAL 2 TIMES DAILY
Qty: 60 TABLET | Refills: 5 | Status: SHIPPED | OUTPATIENT
Start: 2024-01-29 | End: 2024-05-20 | Stop reason: ALTCHOICE

## 2024-01-29 ASSESSMENT — ENCOUNTER SYMPTOMS
FEVER: 0
DIZZINESS: 0
COUGH: 0
APPETITE CHANGE: 0
CONSTIPATION: 0
NAUSEA: 0
HEADACHES: 0
DIARRHEA: 0
NUMBNESS: 0
ABDOMINAL PAIN: 0
DIAPHORESIS: 0
DYSURIA: 0
PALPITATIONS: 0
SHORTNESS OF BREATH: 0
SLEEP DISTURBANCE: 0
HEMATURIA: 0
CHILLS: 0
VOMITING: 0
FREQUENCY: 0

## 2024-01-29 NOTE — PROGRESS NOTES
"11 AM 42320 Ind Psych for Schizoaffective Dx, Bipolar Type (60 MIN, 1585-2880)  Virtual. Supportive Therapy. Discussed recent interaction with mother. Hadn't spoken to mom in a few weeks. Finally had son see how she was doing when he visited. Continues to have confusion/dementia symptoms, her /partner took her phone because she was calling others and not making sense. Asked son to have her call her, \"she doesn't want to be bothered.\" Finally able to have short phone call. Apparently, now more problems with showering, getting out of bed. Will likely need caregiver help soon. Also, discussed mom's partner who patient thinks has mental health issues. Missing talking to mom routinely, \"main source to vent). Worries if has to be hospitalized what she'll do, mom caring for kids in the past. Discussed some concerns with oldest son, Murphy, not doing chores, keeping room clean, hygiene-showers, comb hair, brush teeth. Frustrated by this. He had 10 cavities last dentist appt. Plans on making choreboard for her kids. Patient feeling depressed, \"hopeless, useless\", bothered how she looks, having some dental healht issues, \"don't feel attractive.\" Noted mental health issues since a teen. Discussed relationship with bf (55), seeing each other 5 years. Patient is 44. Feeling her needs not getting met. Bf tell brad he doesn't want her talking about stuff, \"too heavy for me.\" He asks for money routinely, \"immature.\" Questioning value of being in relationship. Started Caplyta (10.5 mg/day) recently, expensive med, hoping it results in less depression. Struggling for sense of purpose, only focused on kids. Next: 1 week.   "

## 2024-01-29 NOTE — PROGRESS NOTES
Subjective   Patient ID: Luca Delgado is a 44 y.o. female with PMH  who presents for fatigue/malaise.     Med Refill  Pertinent negatives include no abdominal pain, chest pain, chills, coughing, diaphoresis, fever, headaches, nausea, numbness, rash or vomiting.     #fatigue  - general sluggishness for several months  - denies other symptoms  - recently changed from latuda to caplyta  - sleeps 6 hours maximum most nights, variable sleep routine  - no cp, sob, cough, does have congestion   - recently started snoring  - diet: no recent changes; mostly chicken with varied vegetables     #Congestion  - feels as though mucous stuck in throat  - has difficulty clearing, but coughs up thick yellow mucous   - denies fever, cough, sob, dysphagia, sick contacts      SH: no drugs or alcohol; currently unemployed  Sexual hx: no new partners, no concern for STIs    Patient Active Problem List   Diagnosis    Chest congestion    Gastroesophageal reflux disease without esophagitis    Vitamin D deficiency    Chronic left shoulder pain    Abnormal thyroid function test    ADD (attention deficit disorder)    Anxiety disorder    Callus of foot    Depression    Diabetes mellitus due to underlying condition with hyperosmolarity without coma (CMS/HCC)    Hair loss    Alopecia areata, unspecified    Asthma    Chest pain    Episodic mood disorder (CMS/HCC)    Fatigue    History of metabolic syndrome    Hoarseness of voice    Hx of thyroid cancer    Hypertension, essential    Mediastinal lymphadenopathy    Metatarsalgia of both feet    Onychomycosis    Postoperative hypothyroidism    Schizoaffective disorder (CMS/HCC)    Sleep difficulties    Substance-related disorder (CMS/HCC)    Thyroid nodule    Tobacco use disorder    Vision changes    Left shoulder pain    Dental caries    Depressive disorder    ESTUARDO (generalized anxiety disorder)    History of malignant neoplasm of thyroid    Pre-ulcerative calluses    Trauma and stressor-related  disorder    Tremor      Current Outpatient Medications   Medication Instructions    amLODIPine (NORVASC) 10 mg, oral, Daily    ammonium lactate (Amlactin) 12 % cream No dose, route, or frequency recorded.    atorvastatin (LIPITOR) 40 mg, oral, Daily, For your elevated cholesterol levels.    betamethasone dipropionate (Diprolene) 0.05 % ointment 1 Application    buPROPion XL (WELLBUTRIN XL) 150 mg, oral, Daily, Do not crush, chew, or split.    buPROPion XL (WELLBUTRIN XL) 300 mg, oral, Daily before breakfast    Caplyta 10.5 mg, oral, Daily    chlorhexidine (Peridex) 0.12 % solution Rinse mouth with 15 ML for 30 seconds and spit out after tooth brush ( refer to prescription notes)    cholecalciferol (VITAMIN D-3) 125 mcg, oral, Daily    ciclopirox (Penlac) 8 % solution Topical    cyclobenzaprine (Flexeril) 5 mg tablet 1 tablet, oral, Every 8 hours PRN, No further refills until does shoulder Xray    Daily-Jarocho, with folic acid, 400 mcg tablet 1 tablet, oral, Daily    dicyclomine (BENTYL) 20 mg, oral, 4 times daily    fluticasone (Flonase) 50 mcg/actuation nasal spray 2 sprays, Each Nostril, Daily    hydrOXYzine HCL (ATARAX) 10 mg, oral, Daily PRN    ibuprofen 800 mg tablet oral    lamoTRIgine (LAMICTAL) 200 mg, oral, 2 times daily    levothyroxine (SYNTHROID, LEVOXYL) 150 mcg, oral, Daily    lidocaine 4 % patch APPLY ONE PATCH ON PAINFUL AREA  FOR 12 HOURS EACH DAY, THEN REMOVE FOR 12 HOURS    loratadine (Claritin) 10 mg tablet oral    lumateperone (Caplyta) 10.5 mg capsule oral    meclizine (ANTIVERT) 12.5 mg, oral, 3 times daily PRN    metFORMIN (Glucophage) 500 mg tablet oral    multivitamin (MULTIPLE VITAMINS ORAL) oral    multivitamin tablet 1 tablet, oral, Daily    mupirocin (Bactroban) 2 % ointment Topical, Daily, to affected area    nicotine (Nicoderm CQ) 21 mg/24 hr patch transdermal, Daily RT    omeprazole (PRILOSEC) 40 mg, oral, Daily before breakfast    ondansetron ODT (ZOFRAN-ODT) 4 mg, oral, Every 4 hours  PRN    topiramate (Topamax) 25 mg tablet oral    urea (Carmol) 40 % cream APPLY AND RUB IN A THIN FILM TO AFFECTED AREA(S) TO BOTH FEET DAILY.AS NEEDED      Past Surgical History:   Procedure Laterality Date    THYROID SURGERY  08/19/2016    Thyroid Surgery      Allergies   Allergen Reactions    Buspar [Buspirone] Dizziness     Patient called and reported had vertigo from buspar as a side effect     Latex Hives, Itching and Rash    Penicillins Hives     hives and shortness of breath but patient states she has taken amoxicillin without any problems      Social History     Tobacco Use    Smoking status: Every Day     Types: Cigarettes    Smokeless tobacco: Never   Substance Use Topics    Alcohol use: Yes    Drug use: Never      Family History   Problem Relation Name Age of Onset    Other (Malignant neoplasm of breast) Other Aunt     Cancer Other Aunt     Diabetes Other Uncle         Review of Systems   Constitutional:  Negative for appetite change, chills, diaphoresis and fever.   HENT:  Negative for ear pain.    Eyes:  Negative for visual disturbance.   Respiratory:  Negative for cough and shortness of breath.    Cardiovascular:  Negative for chest pain and palpitations.   Gastrointestinal:  Negative for abdominal pain, constipation, diarrhea, nausea and vomiting.   Endocrine: Negative for cold intolerance and heat intolerance.   Genitourinary:  Negative for decreased urine volume, dysuria, frequency, hematuria and urgency.   Skin:  Negative for rash.   Neurological:  Negative for dizziness, numbness and headaches.   Psychiatric/Behavioral:  Negative for sleep disturbance and suicidal ideas.        Objective   Vitals: BP (!) 144/95 (BP Location: Right arm, Patient Position: Sitting, BP Cuff Size: Adult)   Pulse 87   Temp 36.6 °C (97.8 °F) (Temporal)   Wt 85.8 kg (189 lb 1.6 oz)   SpO2 99%   BMI 28.33 kg/m²      Physical Exam  General:  Well developed. Alert. No acute distress.  Skin:  Warm, dry. normal skin  turgor. no rashes. no lesions.   Head: NCAT  EENT: MMM  Cardiovascular:  Regular rate and rhythm, normal S1 and S2, no gallops, no murmurs and no pericardial rub.  Pulmonary:  CTAB in all fields  Abdomen:  (+) BS. soft. non-tender. non-distended. no abdominal masses. no guarding or rigidity.  Neurologic:  grossly intact  Musculoskeletal: moves all extremities spontaneously  Psychiatric:  appropriate mood and affect    Assessment/Plan   45 yo F presenting for complaint of new fatigue. Vitals stable, and exam unremarkable. Suspect significant contribution of medication regimen, but will r/o other causes as this has been chronic concern for patient. No significant risk factors for infectious cause, but will obtain CBC, ESR, and CRP, which will also r/o autoimmune cause. YAW also ordered. UA + albumin ordered to assess nutritional status, as well as CMP, B12, and vit D.  Will also refer to sleep medicine with some concern for ALFRED and long-term difficulties with sleep.     Fatigue, unspecified type  Vitamin D deficiency, unspecified  -     CBC and Auto Differential; Future  -     Comprehensive metabolic panel; Future  -     Vitamin B12; Future  -     Magnesium; Future  -     Vitamin D 25-Hydroxy,Total (for eval of Vitamin D levels); Future  -     YAW; Future  -     Sedimentation Rate; Future  -     C-reactive protein; Future  -     Urinalysis with Reflex Culture and Microscopic; Future  -     Albumin, urine, random; Future  -     Referral to Adult Sleep Medicine; Future    Congestion of upper airway  -     guaiFENesin (Mucinex) 600 mg 12 hr tablet; Take 1 tablet (600 mg) by mouth 2 times a day. Do not crush, chew, or split.    Snoring  -     Referral to Adult Sleep Medicine; Future    Will call to discuss results and further follow-up.  Attending Supervision: seen and discussed with attending physician Dr. Carly Nelson.  Kaylan Steen MD  PGY-2, Family Medicine.

## 2024-01-30 LAB
BACTERIA UR CULT: NORMAL
FERRITIN SERPL-MCNC: 42 NG/ML (ref 8–150)
IRON SATN MFR SERPL: 32 % (ref 25–45)
IRON SERPL-MCNC: 114 UG/DL (ref 35–150)
TIBC SERPL-MCNC: 361 UG/DL (ref 240–445)
UIBC SERPL-MCNC: 247 UG/DL (ref 110–370)

## 2024-01-31 LAB
ANA PATTERN: ABNORMAL
ANA SER QL HEP2 SUBST: POSITIVE
ANA TITR SER IF: ABNORMAL {TITER}

## 2024-02-05 NOTE — PROGRESS NOTES
I saw and evaluated the patient. I personally obtained the key and critical portions of the history and physical exam or was physically present for key and critical portions performed by the resident/fellow. I reviewed the resident/fellow's documentation and discussed the patient with the resident/fellow. I agree with the resident/fellow's medical decision making as documented in the note with theaddition of the following:    Agree there is no evidence through review of systems or physical exam for acute upper or lower respiratory illness, acute or chronic sinusitis, or allergic etiology for congestion.  However, would consider further investigation for allergy and chronic sinusitis if no other source of congestion is identified.  Agree with referral to sleep medicine to investigate for ALFRED and to exclude primary snoring.     Carly Nelson MD

## 2024-02-14 ENCOUNTER — TELEMEDICINE (OUTPATIENT)
Dept: BEHAVIORAL HEALTH | Facility: CLINIC | Age: 44
End: 2024-02-14
Payer: COMMERCIAL

## 2024-02-14 DIAGNOSIS — F25.0 SCHIZOAFFECTIVE DISORDER, BIPOLAR TYPE (MULTI): ICD-10-CM

## 2024-02-14 DIAGNOSIS — F43.9 TRAUMA AND STRESSOR-RELATED DISORDER: ICD-10-CM

## 2024-02-14 DIAGNOSIS — G25.9 EXTRAPYRAMIDAL REACTION: ICD-10-CM

## 2024-02-14 DIAGNOSIS — F98.8 ATTENTION DEFICIT DISORDER (ADD) WITHOUT HYPERACTIVITY: ICD-10-CM

## 2024-02-14 DIAGNOSIS — F41.1 GAD (GENERALIZED ANXIETY DISORDER): ICD-10-CM

## 2024-02-14 DIAGNOSIS — F33.1 DEPRESSION, MAJOR, RECURRENT, MODERATE (MULTI): ICD-10-CM

## 2024-02-14 PROCEDURE — 99213 OFFICE O/P EST LOW 20 MIN: CPT | Performed by: CLINICAL NURSE SPECIALIST

## 2024-02-14 PROCEDURE — 3061F NEG MICROALBUMINURIA REV: CPT | Performed by: CLINICAL NURSE SPECIALIST

## 2024-02-14 RX ORDER — BUPROPION HYDROCHLORIDE 150 MG/1
150 TABLET ORAL DAILY
Qty: 90 TABLET | Refills: 0 | Status: SHIPPED | OUTPATIENT
Start: 2024-02-14 | End: 2024-03-13 | Stop reason: SDUPTHER

## 2024-02-14 RX ORDER — BUPROPION HYDROCHLORIDE 300 MG/1
300 TABLET ORAL
Qty: 90 TABLET | Refills: 0 | Status: SHIPPED | OUTPATIENT
Start: 2024-02-14 | End: 2024-03-13 | Stop reason: SDUPTHER

## 2024-02-14 RX ORDER — LUMATEPERONE 21 MG/1
21 CAPSULE ORAL DAILY
Qty: 30 CAPSULE | Refills: 1 | Status: SHIPPED | OUTPATIENT
Start: 2024-02-14 | End: 2024-03-13 | Stop reason: WASHOUT

## 2024-02-14 RX ORDER — BENZTROPINE MESYLATE 1 MG/1
1 TABLET ORAL 2 TIMES DAILY
Qty: 180 TABLET | Refills: 0 | Status: SHIPPED | OUTPATIENT
Start: 2024-02-14 | End: 2024-03-13 | Stop reason: SDUPTHER

## 2024-02-14 RX ORDER — LAMOTRIGINE 200 MG/1
200 TABLET ORAL 2 TIMES DAILY
Qty: 180 TABLET | Refills: 0 | Status: SHIPPED | OUTPATIENT
Start: 2024-02-14 | End: 2024-03-13 | Stop reason: SDUPTHER

## 2024-02-14 NOTE — PROGRESS NOTES
Outpatient Psychiatry      Subjective     December Mack, a 44 y.o. female,presents for follow up for outpatient psychiatry /medication management appointment as an established patient          Diagnosis:   Schizoaffective disorder (295.70) (F25.9) bipolar type primary diagnoses   Sleep disturbances (780.50) (G47.9)  Trauma and stressor-related disorder   ESTUARDO (generalized anxiety disorder)   Add       Patient Active Problem List   Diagnosis    Chest congestion    Gastroesophageal reflux disease without esophagitis    Vitamin D deficiency    Chronic left shoulder pain    Abnormal thyroid function test    ADD (attention deficit disorder)    Anxiety disorder    Callus of foot    Depression    Diabetes mellitus due to underlying condition with hyperosmolarity without coma (CMS/HCC)    Hair loss    Alopecia areata, unspecified    Asthma    Chest pain    Episodic mood disorder (CMS/HCC)    Fatigue    History of metabolic syndrome    Hoarseness of voice    Hx of thyroid cancer    Hypertension, essential    Mediastinal lymphadenopathy    Metatarsalgia of both feet    Onychomycosis    Postoperative hypothyroidism    Schizoaffective disorder (CMS/HCC)    Sleep difficulties    Substance-related disorder (CMS/HCC)    Thyroid nodule    Tobacco use disorder    Vision changes    Left shoulder pain    Dental caries    Depressive disorder    ESTUARDO (generalized anxiety disorder)    History of malignant neoplasm of thyroid    Pre-ulcerative calluses    Trauma and stressor-related disorder    Tremor     Treatment Plan/Recommendations: 4-6 weeks follow up , can continue self care and wellness efforts and maintain routine health screenings.can call  for treatment and scheduling concerns.    Follow-up plan for depression was discussed with patient.    HPI:    denies issues with substance abuse   no thoughts of harming herself or others   reconciled and reviewed medication list in the [facility] emr and provided psychoeducation    support provided  reviewed and reconciled medication list in the [facility] emr   she spoke about stressors and intends to continue therapy   says mood can get anxious and depressed over stressors , support provided   Continues to work on  med compliance        Current Outpatient Medications:     amLODIPine (Norvasc) 10 mg tablet, Take 1 tablet (10 mg) by mouth once daily., Disp: 90 tablet, Rfl: 3    ammonium lactate (Amlactin) 12 % cream, , Disp: , Rfl:     atorvastatin (Lipitor) 40 mg tablet, Take 1 tablet (40 mg) by mouth once daily. For your elevated cholesterol levels., Disp: 90 tablet, Rfl: 3    betamethasone dipropionate (Diprolene) 0.05 % ointment, 1 Application, Disp: , Rfl:     buPROPion XL (Wellbutrin XL) 150 mg 24 hr tablet, Take 1 tablet (150 mg) by mouth once daily. Do not crush, chew, or split., Disp: 90 tablet, Rfl: 0    buPROPion XL (Wellbutrin XL) 300 mg 24 hr tablet, Take 1 tablet (300 mg) by mouth once daily in the morning. Take before meals., Disp: 90 tablet, Rfl: 0    chlorhexidine (Peridex) 0.12 % solution, Rinse mouth with 15 ML for 30 seconds and spit out after tooth brush ( refer to prescription notes), Disp: , Rfl:     cholecalciferol (Vitamin D-3) 125 MCG (5000 UT) capsule, Take 1 capsule (125 mcg) by mouth once daily., Disp: 90 capsule, Rfl: 3    ciclopirox (Penlac) 8 % solution, Apply topically., Disp: , Rfl:     Daily-Jarocho, with folic acid, 400 mcg tablet, Take 1 tablet by mouth once daily., Disp: 90 each, Rfl: 3    fluticasone (Flonase) 50 mcg/actuation nasal spray, Administer 2 sprays into each nostril once daily., Disp: , Rfl:     guaiFENesin (Mucinex) 600 mg 12 hr tablet, Take 1 tablet (600 mg) by mouth 2 times a day. Do not crush, chew, or split., Disp: 60 tablet, Rfl: 5    hydrOXYzine HCL (Atarax) 10 mg tablet, Take 1 tablet (10 mg) by mouth once daily as needed for anxiety., Disp: 30 tablet, Rfl: 0    ibuprofen 800 mg tablet, Take by mouth., Disp: , Rfl:     lamoTRIgine (LaMICtal)  200 mg tablet, Take 1 tablet (200 mg) by mouth 2 times a day., Disp: 180 tablet, Rfl: 0    levothyroxine (Synthroid, Levoxyl) 150 mcg tablet, Take 1 tablet (150 mcg) by mouth once daily., Disp: 90 tablet, Rfl: 1    lidocaine 4 % patch, APPLY ONE PATCH ON PAINFUL AREA  FOR 12 HOURS EACH DAY, THEN REMOVE FOR 12 HOURS, Disp: , Rfl:     loratadine (Claritin) 10 mg tablet, Take by mouth., Disp: , Rfl:     lumateperone (Caplyta) 10.5 mg capsule, Take by mouth., Disp: , Rfl:     lumateperone (Caplyta) 10.5 mg capsule, Take 10.5 mg by mouth once daily., Disp: 30 capsule, Rfl: 1    meclizine (Antivert) 12.5 mg tablet, Take 1 tablet (12.5 mg) by mouth 3 times a day as needed., Disp: , Rfl:     metFORMIN (Glucophage) 500 mg tablet, Take by mouth., Disp: , Rfl:     multivitamin (MULTIPLE VITAMINS ORAL), Take by mouth., Disp: , Rfl:     multivitamin tablet, Take 1 tablet by mouth once daily., Disp: , Rfl:     mupirocin (Bactroban) 2 % ointment, Apply topically once daily. to affected area, Disp: , Rfl:     nicotine (Nicoderm CQ) 21 mg/24 hr patch, Place on the skin once daily., Disp: , Rfl:     omeprazole (PriLOSEC) 40 mg DR capsule, Take 1 capsule (40 mg) by mouth once daily in the morning. Take before meals., Disp: 90 capsule, Rfl: 3    ondansetron ODT (Zofran-ODT) 4 mg disintegrating tablet, Take 1 tablet (4 mg) by mouth every 4 hours if needed., Disp: , Rfl:     topiramate (Topamax) 25 mg tablet, Take by mouth., Disp: , Rfl:     urea (Carmol) 40 % cream, APPLY AND RUB IN A THIN FILM TO AFFECTED AREA(S) TO BOTH FEET DAILY.AS NEEDED, Disp: , Rfl:   Medical History:  Past Medical History:   Diagnosis Date    Acute upper respiratory infection, unspecified 12/08/2016    URI, acute    Cannabis abuse, uncomplicated     Marijuana abuse, continuous    Encounter for general adult medical examination without abnormal findings 08/12/2018    Routine history and physical examination of adult    Encounter for screening for respiratory  tuberculosis 12/08/2016    Tuberculosis screening    Other abnormality of red blood cells 07/15/2019    Microcytosis    Other psychoactive substance use, unspecified with unspecified psychoactive substance-induced disorder (CMS/HCC)     Substance-related disorder    Pain in right knee 01/11/2018    Bilateral knee pain    Personal history of malignant neoplasm of thyroid     History of thyroid cancer    Personal history of malignant neoplasm of thyroid 10/14/2021    History of malignant neoplasm of thyroid    Personal history of malignant neoplasm of thyroid 12/15/2016    History of thyroid cancer    Personal history of other diseases of the nervous system and sense organs 03/23/2016    History of seizure disorder    Personal history of other diseases of the respiratory system     History of allergic rhinitis    Personal history of other diseases of the respiratory system 08/06/2018    History of allergic rhinitis    Personal history of other drug therapy 12/08/2016    History of influenza vaccination    Personal history of other endocrine, nutritional and metabolic disease 08/06/2018    History of hypercalcemia    Personal history of other endocrine, nutritional and metabolic disease 08/02/2018    History of vitamin D deficiency    Personal history of other infectious and parasitic diseases 08/20/2016    History of onychomycosis    Personal history of other mental and behavioral disorders     History of affective disorder    Personal history of other specified conditions 07/12/2021    History of dysuria    Personal history of other specified conditions 04/13/2018    History of fatigue    Vitamin D deficiency, unspecified     Vitamin D deficiency     Surgical History:  Past Surgical History:   Procedure Laterality Date    THYROID SURGERY  08/19/2016    Thyroid Surgery     Family History:  Family History   Problem Relation Name Age of Onset    Other (Malignant neoplasm of breast) Other Aunt     Cancer Other Aunt      Diabetes Other Uncle      Social History:  Social History     Socioeconomic History    Marital status: Single     Spouse name: Not on file    Number of children: Not on file    Years of education: Not on file    Highest education level: Not on file   Occupational History    Not on file   Tobacco Use    Smoking status: Every Day     Types: Cigarettes    Smokeless tobacco: Never   Substance and Sexual Activity    Alcohol use: Yes    Drug use: Never    Sexual activity: Not on file   Other Topics Concern    Not on file   Social History Narrative    Not on file     Social Determinants of Health     Financial Resource Strain: Not on file   Food Insecurity: Not on file   Transportation Needs: Not on file   Physical Activity: Not on file   Stress: Not on file   Social Connections: Not on file   Intimate Partner Violence: Not on file   Housing Stability: Not on file     Record Review: brief     Vitals:  There were no vitals filed for this visit.    Reba Stauffer, APRN-CNS

## 2024-02-21 ENCOUNTER — TELEMEDICINE (OUTPATIENT)
Dept: BEHAVIORAL HEALTH | Facility: CLINIC | Age: 44
End: 2024-02-21
Payer: COMMERCIAL

## 2024-02-21 DIAGNOSIS — F25.0 SCHIZOAFFECTIVE DISORDER, BIPOLAR TYPE (MULTI): ICD-10-CM

## 2024-02-21 PROCEDURE — 90837 PSYTX W PT 60 MINUTES: CPT | Performed by: PSYCHOLOGIST

## 2024-02-21 PROCEDURE — 3061F NEG MICROALBUMINURIA REV: CPT | Performed by: PSYCHOLOGIST

## 2024-02-21 NOTE — PROGRESS NOTES
"14 PM [address] [facility] for Schizoaffective Dx, Bipolar Type (60 MIN, [phone])  Virtual. Supportive Therapy. Phone had been shut off. Getting new phone and carrier. Relying on gov't phone. Discussed concerns regarding oldest son who's been depressed. Recently hair cut and felt ridiculed in school as a result. Trying to get more support services at school and son has mental health provider through . Youngest son, Tavon, home with black eye after getting into altercation with peer. Mother is doing ok, back to routine contact. Concerned with her mood swings, depression and lack of stability. On new med, lumateperone/Caplyta (21 mg) but has SE's, taking cogentin to manage. Tired of SE's. Asked about ECT risks/benefits. Discussed long-term struggle with depression, age 15, mom considering ECT for her. Frustrated with recent blood test, abnormal results, suggesting possible lupus. Encouraged her to reach out to provider for clarification.  Received letter from \"[employer] [place]\" involving son, Tavon, missing time from school. Discussed her poor body image, sees self as fat, \"when I look into the mirror\", long-standing problem. Daughter still at mom's. Next: 1 week.   "

## 2024-02-27 DIAGNOSIS — R76.8 ELEVATED ANTINUCLEAR ANTIBODY (ANA) LEVEL: Primary | ICD-10-CM

## 2024-02-27 DIAGNOSIS — D50.9 MICROCYTIC ANEMIA: ICD-10-CM

## 2024-02-27 NOTE — PROGRESS NOTES
Reached the patient at her home number, [phone]  She had seen a note about a positive YAW but not follow-up instructions, and she did not have a follow-up appointment.  We discussed the idea that the YAW can be positive for a variety of reasons and was part of the set of tests to understand her fatigue better.  She is receptive to coming for another set of tests which will include repeat YAW, DIANA panel, which has been ordered separately, reticulocytes and hemoglobin identification to workup her mild anemia with microcytosis.  Ms. Delgado expressed understanding and will get those tests done.  Will call her about a follow-up appointment. With Dr. Steen.     I have also change the priority of her phone contacts :  Primary number is now [phone]    I verified that she is able to use [employer] and can send and receive messages that way

## 2024-02-28 ENCOUNTER — TELEMEDICINE (OUTPATIENT)
Dept: BEHAVIORAL HEALTH | Facility: CLINIC | Age: 44
End: 2024-02-28
Payer: COMMERCIAL

## 2024-02-28 DIAGNOSIS — Z86.39 HISTORY OF VITAMIN D DEFICIENCY: Primary | ICD-10-CM

## 2024-02-28 DIAGNOSIS — F25.0 SCHIZOAFFECTIVE DISORDER, BIPOLAR TYPE (MULTI): ICD-10-CM

## 2024-02-28 DIAGNOSIS — Z86.39 HISTORY OF VITAMIN D DEFICIENCY: ICD-10-CM

## 2024-02-28 PROCEDURE — 3061F NEG MICROALBUMINURIA REV: CPT | Performed by: PSYCHOLOGIST

## 2024-02-28 PROCEDURE — 90834 PSYTX W PT 45 MINUTES: CPT | Performed by: PSYCHOLOGIST

## 2024-02-28 RX ORDER — CHOLECALCIFEROL (VITAMIN D3) 50 MCG
50 TABLET ORAL DAILY
Qty: 30 TABLET | Refills: 11 | Status: SHIPPED | OUTPATIENT
Start: 2024-02-28 | End: 2024-03-04

## 2024-02-28 NOTE — PROGRESS NOTES
"2 PM [address] Indiv Psych for Schizoaffective Dx, Bipolar Type (45 MIN, [phone])  Virtual. Supportive Therapy. \"Bad\" past few days. More irritable. Spent most of time discussing interpersonal problems with sister and neighbor. Sister always complaining about not having enough help, wanting patient to watch her daughter. Patient reflects on how sister has it compared to her and her responsibilities, difficult to be empathic. \"What do you need help for?\" \"Fallout\" with neighbor. Doesn't approve of neighbor's decision-making. \"I feel she has bipolar, giving me stuff.\" Informed patient she was getting ready to move. \"She didn't want me to communicate with her son.\" Bothered that she has a daughter with special needs and talks about talking to a man who's incarcerated for child molestation and sees this as poor decision-making and not considering her children. Patient frustrated by this. Next: 1-2 weeks.   " Complete Exam:  2021   Accompanied by: Self  Provides history: Self    Subjective:  Gino Hagan is a 65 year old male who presents today for a complete physical.     Also has the following additional concerns:     ALLERGIES:  No Known Allergies  Current Outpatient Medications   Medication Sig Dispense Refill   • econazole (SPECTAZOLE) 1 % cream Apply topically 2 times daily. 15 g 3   • lisinopril (ZESTRIL) 20 MG tablet Take 1 tablet by mouth daily. 90 tablet 3   • ciclopirox olamine (LOPROX) 0.77 % cream Apply topically 2 times daily. For up to 4 weeks to genital area. 30 g 0   • cetirizine (ZYRTEC ALLERGY) 10 MG tablet Take 1 tablet by mouth daily. 90 tablet 3     No current facility-administered medications for this visit.     There is no problem list on file for this patient.    Other vitamins or supplements being used?   No    Social History:  Work/Activites: employed -   Marital Status:   Smoking: denies smoking  Alcohol: drinks occasionally in social situations  Drugs: denies  Lives: with spouse      Exercise: no regular exercise program    Surgical History:  The patient has had the following surgeries:   Hernia    Family History:  Father:  at age unknown of Complications from surgery  Mother:  at age unknown of Arthritis       Siblings: 4 - has the following health concerns: None  Children: 3 - has the following health concerns: None    Past Medical History:  Problem list reviewed    Review of Systems   Constitutional: Negative.    HENT: Negative.    Eyes: Negative.    Respiratory: Negative.    Cardiovascular: Negative.    Gastrointestinal: Negative.    Endocrine: Negative.    Genitourinary: Negative.    Musculoskeletal: Negative.    Skin: Negative.    Neurological: Negative.    Hematological: Negative.    Psychiatric/Behavioral: Negative.      Objective:  /80   Pulse 78   Ht 5' 6\" (1.676 m)   Wt 87.1 kg (192 lb)   BMI 30.99 kg/m²   BSA 1.97 m²     Physical Exam  Vitals reviewed.   Constitutional:       General: He is not in acute distress.     Appearance: Normal appearance. He is well-developed. He is not toxic-appearing.   HENT:      Head: Normocephalic.      Right Ear: Tympanic membrane, ear canal and external ear normal.      Left Ear: Tympanic membrane, ear canal and external ear normal.      Nose: Nose normal. No mucosal edema or rhinorrhea.      Mouth/Throat:      Pharynx: Uvula midline. No oropharyngeal exudate or posterior oropharyngeal erythema.   Eyes:      General: Lids are normal.         Right eye: No discharge.         Left eye: No discharge.      Extraocular Movements:      Right eye: Normal extraocular motion.      Left eye: Normal extraocular motion.   Neck:      Thyroid: No thyroid mass.      Vascular: No JVD.      Trachea: Trachea normal.   Cardiovascular:      Rate and Rhythm: Normal rate and regular rhythm.      Heart sounds: Normal heart sounds, S1 normal and S2 normal. No murmur heard.   No S3 or S4 sounds.    Pulmonary:      Effort: Pulmonary effort is normal. No respiratory distress.      Breath sounds: Normal breath sounds. No wheezing, rhonchi or rales.   Abdominal:      General: Bowel sounds are normal. There is no distension.      Palpations: Abdomen is soft. There is no splenomegaly or mass.      Tenderness: There is no abdominal tenderness. There is no guarding.   Musculoskeletal:         General: Normal range of motion.      Cervical back: Normal range of motion and neck supple.   Lymphadenopathy:      Cervical: No cervical adenopathy.   Skin:     General: Skin is warm.      Findings: No rash.   Neurological:      Mental Status: He is alert and oriented to person, place, and time.      Cranial Nerves: No cranial nerve deficit.   Psychiatric:         Attention and Perception: He is attentive.         Mood and Affect: Mood is not anxious or depressed.         Speech: Speech is not rapid and pressured.         Behavior: Behavior  normal. Behavior is not agitated.         Thought Content: Thought content normal.         Cognition and Memory: Memory is not impaired.         Judgment: Judgment normal. Judgment is not inappropriate.     Assessment/Plan:  Normal exam - see discussion topics below. Discussed the following topics: Healthy diet. Regular exercise of at least 20 mins of cardio 3 times a week.. Medication changes: No. Immunizations given today? No.  Hyperlipidemia -  patient wants to work on this on his own.   Hypertension - continue present therapy   Elevated glucose - patient wants to work on this on his own.  Call if questions or problems.  Schedule follow-up: in 1 year(s)  Camila Barr PA-C 11/4/2021 2:46 PM    Provider billing: Supervising Provider: LAMIN

## 2024-03-04 RX ORDER — CHOLECALCIFEROL (VITAMIN D3) 50 MCG
2000 TABLET ORAL DAILY
Qty: 90 TABLET | Refills: 0 | Status: SHIPPED | OUTPATIENT
Start: 2024-03-04

## 2024-03-13 ENCOUNTER — TELEMEDICINE (OUTPATIENT)
Dept: BEHAVIORAL HEALTH | Facility: CLINIC | Age: 44
End: 2024-03-13
Payer: COMMERCIAL

## 2024-03-13 DIAGNOSIS — F41.9 ANXIETY DISORDER, UNSPECIFIED TYPE: ICD-10-CM

## 2024-03-13 DIAGNOSIS — F33.1 DEPRESSION, MAJOR, RECURRENT, MODERATE (MULTI): ICD-10-CM

## 2024-03-13 DIAGNOSIS — F32.89 OTHER DEPRESSION: ICD-10-CM

## 2024-03-13 DIAGNOSIS — F25.0 SCHIZOAFFECTIVE DISORDER, BIPOLAR TYPE (MULTI): ICD-10-CM

## 2024-03-13 DIAGNOSIS — G25.9 EXTRAPYRAMIDAL REACTION: ICD-10-CM

## 2024-03-13 PROCEDURE — 3061F NEG MICROALBUMINURIA REV: CPT | Performed by: CLINICAL NURSE SPECIALIST

## 2024-03-13 PROCEDURE — 99212 OFFICE O/P EST SF 10 MIN: CPT | Performed by: CLINICAL NURSE SPECIALIST

## 2024-03-13 RX ORDER — LAMOTRIGINE 200 MG/1
200 TABLET ORAL 2 TIMES DAILY
Qty: 180 TABLET | Refills: 0 | Status: SHIPPED | OUTPATIENT
Start: 2024-03-13 | End: 2024-04-10 | Stop reason: SDUPTHER

## 2024-03-13 RX ORDER — BENZTROPINE MESYLATE 1 MG/1
1 TABLET ORAL 2 TIMES DAILY
Qty: 180 TABLET | Refills: 0 | Status: SHIPPED | OUTPATIENT
Start: 2024-03-13 | End: 2024-04-10 | Stop reason: SDUPTHER

## 2024-03-13 RX ORDER — BUPROPION HYDROCHLORIDE 300 MG/1
300 TABLET ORAL
Qty: 90 TABLET | Refills: 0 | Status: SHIPPED | OUTPATIENT
Start: 2024-03-13 | End: 2024-04-10 | Stop reason: SDUPTHER

## 2024-03-13 RX ORDER — BUPROPION HYDROCHLORIDE 150 MG/1
150 TABLET ORAL DAILY
Qty: 90 TABLET | Refills: 0 | Status: SHIPPED | OUTPATIENT
Start: 2024-03-13 | End: 2024-04-10 | Stop reason: SDUPTHER

## 2024-03-13 NOTE — PROGRESS NOTES
Outpatient Psychiatry      Subjective   presents for follow up for outpatient psychiatry /medication management appointment as an established patient          Diagnosis:   Schizoaffective disorder (295.70) (F25.9) bipolar type primary diagnoses   Sleep disturbances (780.50) (G47.9)  Trauma and stressor-related disorder   ESTUARDO (generalized anxiety disorder)   Add        Patient Active Problem List   Diagnosis    Chest congestion    Gastroesophageal reflux disease without esophagitis    Vitamin D deficiency    Chronic left shoulder pain    Abnormal thyroid function test    ADD (attention deficit disorder)    Anxiety disorder    Callus of foot    Depression    Diabetes mellitus due to underlying condition with hyperosmolarity without coma (CMS/HCC)    Hair loss    Alopecia areata, unspecified    Asthma    Chest pain    Episodic mood disorder (CMS/HCC)    Fatigue    History of metabolic syndrome    Hoarseness of voice    Hx of thyroid cancer    Hypertension, essential    Mediastinal lymphadenopathy    Metatarsalgia of both feet    Onychomycosis    Postoperative hypothyroidism    Schizoaffective disorder (CMS/HCC)    Sleep difficulties    Substance-related disorder (CMS/HCC)    Thyroid nodule    Tobacco use disorder    Vision changes    Left shoulder pain    Dental caries    Depressive disorder    ESTUARDO (generalized anxiety disorder)    History of malignant neoplasm of thyroid    Pre-ulcerative calluses    Trauma and stressor-related disorder    Tremor     Treatment Plan/Recommendations: 4-6 weeks follow up , can continue self care and wellness efforts and maintain routine health screenings.can call  for treatment and scheduling concerns.     Follow-up plan was discussed with patient.    Review with patient: Treatment plan reviewed with the patient.  Medication risks/benefit reviewed with the patient    HPI:  denies issues with substance abuse   no thoughts of harming herself or others   reconciled and reviewed  medication list in the [facility] emr and provided psychoeducation   support provided  reviewed and reconciled medication list in the [facility] emr   she spoke about stressors and intends to continue therapy   says mood can get anxious and depressed over stressors , support provided   Continues to work on  med compliance   Mood can get depressed, agrees to increase caplyta dose     Medical History:  Past Medical History:   Diagnosis Date    Acute upper respiratory infection, unspecified 12/08/2016    URI, acute    Cannabis abuse, uncomplicated     Marijuana abuse, continuous    Encounter for general adult medical examination without abnormal findings 08/12/2018    Routine history and physical examination of adult    Encounter for screening for respiratory tuberculosis 12/08/2016    Tuberculosis screening    Other abnormality of red blood cells 07/15/2019    Microcytosis    Other psychoactive substance use, unspecified with unspecified psychoactive substance-induced disorder (CMS/HCC)     Substance-related disorder    Pain in right knee 01/11/2018    Bilateral knee pain    Personal history of malignant neoplasm of thyroid     History of thyroid cancer    Personal history of malignant neoplasm of thyroid 10/14/2021    History of malignant neoplasm of thyroid    Personal history of malignant neoplasm of thyroid 12/15/2016    History of thyroid cancer    Personal history of other diseases of the nervous system and sense organs 03/23/2016    History of seizure disorder    Personal history of other diseases of the respiratory system     History of allergic rhinitis    Personal history of other diseases of the respiratory system 08/06/2018    History of allergic rhinitis    Personal history of other drug therapy 12/08/2016    History of influenza vaccination    Personal history of other endocrine, nutritional and metabolic disease 08/06/2018    History of hypercalcemia    Personal history of other endocrine, nutritional and  metabolic disease 08/02/2018    History of vitamin D deficiency    Personal history of other infectious and parasitic diseases 08/20/2016    History of onychomycosis    Personal history of other mental and behavioral disorders     History of affective disorder    Personal history of other specified conditions 07/12/2021    History of dysuria    Personal history of other specified conditions 04/13/2018    History of fatigue    Vitamin D deficiency, unspecified     Vitamin D deficiency     Surgical History:  Past Surgical History:   Procedure Laterality Date    THYROID SURGERY  08/19/2016    Thyroid Surgery     Family History:  Family History   Problem Relation Name Age of Onset    Other (Malignant neoplasm of breast) Other Aunt     Cancer Other Aunt     Diabetes Other Uncle      Social History:  Social History     Socioeconomic History    Marital status: Single     Spouse name: Not on file    Number of children: Not on file    Years of education: Not on file    Highest education level: Not on file   Occupational History    Not on file   Tobacco Use    Smoking status: Every Day     Types: Cigarettes    Smokeless tobacco: Never   Substance and Sexual Activity    Alcohol use: Yes    Drug use: Never    Sexual activity: Not on file   Other Topics Concern    Not on file   Social History Narrative    Not on file     Social Determinants of Health     Financial Resource Strain: Not on file   Food Insecurity: Not on file   Transportation Needs: Not on file   Physical Activity: Not on file   Stress: Not on file   Social Connections: Not on file   Intimate Partner Violence: Not on file   Housing Stability: Not on file     Record Review: brief     Vitals:  There were no vitals filed for this visit.    Reba Stauffer, SHANNAN-CNS

## 2024-03-18 ENCOUNTER — APPOINTMENT (OUTPATIENT)
Dept: PRIMARY CARE | Facility: CLINIC | Age: 44
End: 2024-03-18
Payer: COMMERCIAL

## 2024-03-18 ENCOUNTER — LAB (OUTPATIENT)
Dept: LAB | Facility: LAB | Age: 44
End: 2024-03-18
Payer: COMMERCIAL

## 2024-03-18 DIAGNOSIS — R76.8 ELEVATED ANTINUCLEAR ANTIBODY (ANA) LEVEL: ICD-10-CM

## 2024-03-18 DIAGNOSIS — D50.9 MICROCYTIC ANEMIA: ICD-10-CM

## 2024-03-18 LAB
CENTROMERE B AB SER-ACNC: <0.2 AI
CHROMATIN AB SERPL-ACNC: <0.2 AI
DSDNA AB SER-ACNC: 1 IU/ML
ENA JO1 AB SER QL IA: <0.2 AI
ENA RNP AB SER IA-ACNC: <0.2 AI
ENA SCL70 AB SER QL IA: <0.2 AI
ENA SM AB SER IA-ACNC: <0.2 AI
ENA SM+RNP AB SER QL IA: <0.2 AI
ENA SS-A AB SER IA-ACNC: >8 AI
ENA SS-B AB SER IA-ACNC: <0.2 AI
HGB RETIC QN: 27 PG (ref 28–38)
IMMATURE RETIC FRACTION: 16.8 %
RETICS #: 0.05 X10*6/UL (ref 0.02–0.08)
RETICS/RBC NFR AUTO: 1.1 % (ref 0.5–2)
RIBOSOMAL P AB SER-ACNC: <0.2 AI

## 2024-03-18 PROCEDURE — 36415 COLL VENOUS BLD VENIPUNCTURE: CPT

## 2024-03-18 PROCEDURE — 83020 HEMOGLOBIN ELECTROPHORESIS: CPT | Performed by: FAMILY MEDICINE

## 2024-03-18 PROCEDURE — 86038 ANTINUCLEAR ANTIBODIES: CPT

## 2024-03-18 PROCEDURE — 83021 HEMOGLOBIN CHROMOTOGRAPHY: CPT

## 2024-03-18 PROCEDURE — 86235 NUCLEAR ANTIGEN ANTIBODY: CPT

## 2024-03-18 PROCEDURE — 86225 DNA ANTIBODY NATIVE: CPT

## 2024-03-18 PROCEDURE — 85045 AUTOMATED RETICULOCYTE COUNT: CPT

## 2024-03-19 LAB
ANA PATTERN: ABNORMAL
ANA SER QL HEP2 SUBST: POSITIVE
ANA TITR SER IF: ABNORMAL {TITER}

## 2024-03-21 LAB
HEMOGLOBIN A2: 2.6 % (ref 2–3.5)
HEMOGLOBIN A: 96.8 % (ref 95.8–98)
HEMOGLOBIN F: 0.6 % (ref 0–2)
HEMOGLOBIN IDENTIFICATION INTERPRETATION: ABNORMAL
PATH REVIEW-HGB IDENTIFICATION: ABNORMAL

## 2024-03-27 ENCOUNTER — TELEMEDICINE (OUTPATIENT)
Dept: BEHAVIORAL HEALTH | Facility: CLINIC | Age: 44
End: 2024-03-27
Payer: COMMERCIAL

## 2024-03-27 DIAGNOSIS — F25.0 SCHIZOAFFECTIVE DISORDER, BIPOLAR TYPE (MULTI): ICD-10-CM

## 2024-03-27 PROCEDURE — 90837 PSYTX W PT 60 MINUTES: CPT | Performed by: PSYCHOLOGIST

## 2024-03-27 PROCEDURE — 3061F NEG MICROALBUMINURIA REV: CPT | Performed by: PSYCHOLOGIST

## 2024-03-27 NOTE — PROGRESS NOTES
2 PM [address] Ind Psych for Schizoaffective Dx, Bipolar Type (60 MIN, 210-303)  Virtual. Supportive Therapy. Missed last meeting. Continues to mention problems with new md, Tam Fatima. Reported some SI but denied plans to harm self. Reported she's been irritable and depressed. Also, concerns regarding memory loss. Discussed some concerns about her mother who has dementia. Concerned mom will fall. Sister got job at [employer] and she's said she's unable to watch sister's daughter. Spent most of time discussing concerns about her sons, Tavon and Murphy. Both sons have mood and behavioral problems, youngest also has autistic spectrum disorder. Trying to get him to shower daily. Son damaged bathroom door because he was angry.  Trying to get Murphy on IEP. Said he's been depressed. Will track situations, schedule weekly meetings for a period of time. Next: 1 week.

## 2024-03-29 DIAGNOSIS — R76.8 SS-A ANTIBODY POSITIVE: Primary | ICD-10-CM

## 2024-03-29 NOTE — PROGRESS NOTES
Patient had not heard about second set of labs done 3/18/2024.  YAW remains positive with increased titer at 1-3 20 and SSA returned strongly positive, suggesting diagnosis of SLE or Sjogren's syndrome.  Renal and hepatic function are normal, CBC and iron are normal.     In discussion with patient she reveals a markedly dry mouth, sandpapery feeling in her oral cavity, distorted taste, and difficulty eating dry food without liquid.  She does not wake at night but has to rinse out with water several times a day.  She has not noted enlarged parotid glands, and is edentulous so has not had dental or periodontal symptoms due to dryness.  She reports no dry eye, no feeling of sand or gravel, and no need to use artificial tears.     Plan:  For symptoms of dry mouth, consists continue rinsing when mouth feels dry and lubricate occasionally with an oily substance like olive oil salad dressing  Defer artificial saliva for now, but may be useful later  Be aware of potential for dry eye  Rheumatology referral  Plan to contact rheumatologist with whom she is scheduled about interim interventions  Recent echo 9/23/2023 is normal and without cardiorespiratory signs except for general fatigue, will defer at this time.  I will also contact her PCP Dr. Steen so we can coordinate, and I have encouraged her to leave a message through [employer] with him or me anytime.,  But also endorsed calling the advocate if she is trying to communicate with us and is not receiving a response for any reason.

## 2024-04-10 ENCOUNTER — TELEMEDICINE (OUTPATIENT)
Dept: BEHAVIORAL HEALTH | Facility: CLINIC | Age: 44
End: 2024-04-10
Payer: COMMERCIAL

## 2024-04-10 DIAGNOSIS — F43.9 TRAUMA AND STRESSOR-RELATED DISORDER: ICD-10-CM

## 2024-04-10 DIAGNOSIS — F41.1 GAD (GENERALIZED ANXIETY DISORDER): ICD-10-CM

## 2024-04-10 DIAGNOSIS — G25.9 EXTRAPYRAMIDAL REACTION: ICD-10-CM

## 2024-04-10 DIAGNOSIS — F98.8 ATTENTION DEFICIT DISORDER (ADD) WITHOUT HYPERACTIVITY: ICD-10-CM

## 2024-04-10 DIAGNOSIS — G47.9 SLEEP DIFFICULTIES: ICD-10-CM

## 2024-04-10 DIAGNOSIS — F33.1 DEPRESSION, MAJOR, RECURRENT, MODERATE (MULTI): ICD-10-CM

## 2024-04-10 DIAGNOSIS — F25.0 SCHIZOAFFECTIVE DISORDER, BIPOLAR TYPE (MULTI): ICD-10-CM

## 2024-04-10 PROCEDURE — 3061F NEG MICROALBUMINURIA REV: CPT | Performed by: CLINICAL NURSE SPECIALIST

## 2024-04-10 PROCEDURE — 99213 OFFICE O/P EST LOW 20 MIN: CPT | Performed by: CLINICAL NURSE SPECIALIST

## 2024-04-10 RX ORDER — LAMOTRIGINE 200 MG/1
200 TABLET ORAL 2 TIMES DAILY
Qty: 180 TABLET | Refills: 0 | Status: SHIPPED | OUTPATIENT
Start: 2024-04-10 | End: 2024-07-09

## 2024-04-10 RX ORDER — BUPROPION HYDROCHLORIDE 150 MG/1
150 TABLET ORAL DAILY
Qty: 90 TABLET | Refills: 0 | Status: SHIPPED | OUTPATIENT
Start: 2024-04-10 | End: 2024-07-09

## 2024-04-10 RX ORDER — BUPROPION HYDROCHLORIDE 300 MG/1
300 TABLET ORAL
Qty: 90 TABLET | Refills: 0 | Status: SHIPPED | OUTPATIENT
Start: 2024-04-10 | End: 2024-07-09

## 2024-04-10 RX ORDER — BENZTROPINE MESYLATE 1 MG/1
1 TABLET ORAL 2 TIMES DAILY
Qty: 180 TABLET | Refills: 0 | Status: SHIPPED | OUTPATIENT
Start: 2024-04-10 | End: 2024-07-09

## 2024-04-10 NOTE — PROGRESS NOTES
Outpatient Psychiatry      Subjective     December Mack, a 44 y.o. female, presents for follow up for outpatient psychiatry /medication management appointment as an established patient          Diagnosis:   Schizoaffective disorder (295.70) (F25.9) bipolar type primary diagnoses   Sleep disturbances (780.50) (G47.9)  Trauma and stressor-related disorder   ESTUARDO (generalized anxiety disorder)   Add        Patient Active Problem List   Diagnosis    Chest congestion    Gastroesophageal reflux disease without esophagitis    Vitamin D deficiency    Chronic left shoulder pain    Abnormal thyroid function test    ADD (attention deficit disorder)    Anxiety disorder    Callus of foot    Depression    Diabetes mellitus due to underlying condition with hyperosmolarity without coma (CMS/HCC)    Hair loss    Alopecia areata, unspecified    Asthma    Chest pain    Episodic mood disorder (CMS/HCC)    Fatigue    History of metabolic syndrome    Hoarseness of voice    Hx of thyroid cancer    Hypertension, essential    Mediastinal lymphadenopathy    Metatarsalgia of both feet    Onychomycosis    Postoperative hypothyroidism    Schizoaffective disorder (CMS/HCC)    Sleep difficulties    Substance-related disorder (CMS/HCC)    Thyroid nodule    Tobacco use disorder    Vision changes    Left shoulder pain    Dental caries    Depressive disorder    ESTUARDO (generalized anxiety disorder)    History of malignant neoplasm of thyroid    Pre-ulcerative calluses    Trauma and stressor-related disorder    Tremor     Treatment Plan/Recommendations:  4-6 weeks follow up , can continue self care and wellness efforts and maintain routine health screenings.can call  for treatment and scheduling concerns.     Follow-up plan was discussed with patient.    Review with patient: Treatment plan reviewed with the patient.  Medication risks/benefit reviewed with the patient    HPI:  denies issues with substance abuse   no thoughts of harming herself  or others   reconciled and reviewed medication list in the [facility] emr and provided psychoeducation   support provided  reviewed and reconciled medication list in the [facility] emr   she spoke about stressors and intends to continue therapy   says mood can get anxious and depressed over stressors , support provided    Tolerating medications   She gets periods of times she gets suicidal thoughts , says she would not act on them , she tries to use distraction , she thinks of how this would affect her kids negatively   She gets discouraged with the behaviors of her kids and takes her youngest son to his medical providers , she was told has some care for him every 3 months , she is looking into some summer programs for him      Current Outpatient Medications:     amLODIPine (Norvasc) 10 mg tablet, Take 1 tablet (10 mg) by mouth once daily., Disp: 90 tablet, Rfl: 3    ammonium lactate (Amlactin) 12 % cream, , Disp: , Rfl:     atorvastatin (Lipitor) 40 mg tablet, Take 1 tablet (40 mg) by mouth once daily. For your elevated cholesterol levels., Disp: 90 tablet, Rfl: 3    benztropine (Cogentin) 1 mg tablet, Take 1 tablet (1 mg) by mouth 2 times a day., Disp: 180 tablet, Rfl: 0    betamethasone dipropionate (Diprolene) 0.05 % ointment, 1 Application, Disp: , Rfl:     buPROPion XL (Wellbutrin XL) 150 mg 24 hr tablet, Take 1 tablet (150 mg) by mouth once daily. Do not crush, chew, or split., Disp: 90 tablet, Rfl: 0    buPROPion XL (Wellbutrin XL) 300 mg 24 hr tablet, Take 1 tablet (300 mg) by mouth once daily in the morning. Take before meals., Disp: 90 tablet, Rfl: 0    chlorhexidine (Peridex) 0.12 % solution, Rinse mouth with 15 ML for 30 seconds and spit out after tooth brush ( refer to prescription notes), Disp: , Rfl:     cholecalciferol (Vitamin D-3) 50 MCG (2000 UT) tablet, TAKE 1 TABLET BY MOUTH EVERY DAY, Disp: 90 tablet, Rfl: 0    ciclopirox (Penlac) 8 % solution, Apply topically., Disp: , Rfl:     Daily-Jarocho, with folic  acid, 400 mcg tablet, Take 1 tablet by mouth once daily., Disp: 90 each, Rfl: 3    fluticasone (Flonase) 50 mcg/actuation nasal spray, Administer 2 sprays into each nostril once daily., Disp: , Rfl:     guaiFENesin (Mucinex) 600 mg 12 hr tablet, Take 1 tablet (600 mg) by mouth 2 times a day. Do not crush, chew, or split., Disp: 60 tablet, Rfl: 5    ibuprofen 800 mg tablet, Take by mouth., Disp: , Rfl:     lamoTRIgine (LaMICtal) 200 mg tablet, Take 1 tablet (200 mg) by mouth 2 times a day., Disp: 180 tablet, Rfl: 0    levothyroxine (Synthroid, Levoxyl) 150 mcg tablet, Take 1 tablet (150 mcg) by mouth once daily., Disp: 90 tablet, Rfl: 1    lidocaine 4 % patch, APPLY ONE PATCH ON PAINFUL AREA  FOR 12 HOURS EACH DAY, THEN REMOVE FOR 12 HOURS, Disp: , Rfl:     loratadine (Claritin) 10 mg tablet, Take by mouth., Disp: , Rfl:     lumateperone (Caplyta) 10.5 mg capsule, Take by mouth., Disp: , Rfl:     lumateperone (Caplyta) 42 mg capsule, Take 1 capsule (42 mg) by mouth once daily., Disp: 30 capsule, Rfl: 1    meclizine (Antivert) 12.5 mg tablet, Take 1 tablet (12.5 mg) by mouth 3 times a day as needed., Disp: , Rfl:     metFORMIN (Glucophage) 500 mg tablet, Take by mouth., Disp: , Rfl:     multivitamin (MULTIPLE VITAMINS ORAL), Take by mouth., Disp: , Rfl:     multivitamin tablet, Take 1 tablet by mouth once daily., Disp: , Rfl:     mupirocin (Bactroban) 2 % ointment, Apply topically once daily. to affected area, Disp: , Rfl:     nicotine (Nicoderm CQ) 21 mg/24 hr patch, Place on the skin once daily., Disp: , Rfl:     omeprazole (PriLOSEC) 40 mg DR capsule, Take 1 capsule (40 mg) by mouth once daily in the morning. Take before meals., Disp: 90 capsule, Rfl: 3    ondansetron ODT (Zofran-ODT) 4 mg disintegrating tablet, Take 1 tablet (4 mg) by mouth every 4 hours if needed., Disp: , Rfl:     urea (Carmol) 40 % cream, APPLY AND RUB IN A THIN FILM TO AFFECTED AREA(S) TO BOTH FEET DAILY.AS NEEDED, Disp: , Rfl:   Medical  History:  Past Medical History:   Diagnosis Date    Acute upper respiratory infection, unspecified 12/08/2016    URI, acute    Cannabis abuse, uncomplicated     Marijuana abuse, continuous    Encounter for general adult medical examination without abnormal findings 08/12/2018    Routine history and physical examination of adult    Encounter for screening for respiratory tuberculosis 12/08/2016    Tuberculosis screening    Other abnormality of red blood cells 07/15/2019    Microcytosis    Other psychoactive substance use, unspecified with unspecified psychoactive substance-induced disorder (CMS/HCC)     Substance-related disorder    Pain in right knee 01/11/2018    Bilateral knee pain    Personal history of malignant neoplasm of thyroid     History of thyroid cancer    Personal history of malignant neoplasm of thyroid 10/14/2021    History of malignant neoplasm of thyroid    Personal history of malignant neoplasm of thyroid 12/15/2016    History of thyroid cancer    Personal history of other diseases of the nervous system and sense organs 03/23/2016    History of seizure disorder    Personal history of other diseases of the respiratory system     History of allergic rhinitis    Personal history of other diseases of the respiratory system 08/06/2018    History of allergic rhinitis    Personal history of other drug therapy 12/08/2016    History of influenza vaccination    Personal history of other endocrine, nutritional and metabolic disease 08/06/2018    History of hypercalcemia    Personal history of other endocrine, nutritional and metabolic disease 08/02/2018    History of vitamin D deficiency    Personal history of other infectious and parasitic diseases 08/20/2016    History of onychomycosis    Personal history of other mental and behavioral disorders     History of affective disorder    Personal history of other specified conditions 07/12/2021    History of dysuria    Personal history of other specified  conditions 04/13/2018    History of fatigue    Vitamin D deficiency, unspecified     Vitamin D deficiency     Surgical History:  Past Surgical History:   Procedure Laterality Date    THYROID SURGERY  08/19/2016    Thyroid Surgery     Family History:  Family History   Problem Relation Name Age of Onset    Other (Malignant neoplasm of breast) Other Aunt     Cancer Other Aunt     Diabetes Other Uncle      Social History:  Social History     Socioeconomic History    Marital status: Single     Spouse name: Not on file    Number of children: Not on file    Years of education: Not on file    Highest education level: Not on file   Occupational History    Not on file   Tobacco Use    Smoking status: Every Day     Types: Cigarettes    Smokeless tobacco: Never   Substance and Sexual Activity    Alcohol use: Yes    Drug use: Never    Sexual activity: Not on file   Other Topics Concern    Not on file   Social History Narrative    Not on file     Social Determinants of Health     Financial Resource Strain: Not on file   Food Insecurity: Not on file   Transportation Needs: Not on file   Physical Activity: Not on file   Stress: Not on file   Social Connections: Not on file   Intimate Partner Violence: Not on file   Housing Stability: Not on file     Record Review: brief     Vitals:  There were no vitals filed for this visit.    Reba Stauffer, APRN-CNS

## 2024-04-11 ENCOUNTER — TELEMEDICINE (OUTPATIENT)
Dept: BEHAVIORAL HEALTH | Facility: CLINIC | Age: 44
End: 2024-04-11
Payer: COMMERCIAL

## 2024-04-11 DIAGNOSIS — F25.0 SCHIZOAFFECTIVE DISORDER, BIPOLAR TYPE (MULTI): ICD-10-CM

## 2024-04-11 PROCEDURE — 3061F NEG MICROALBUMINURIA REV: CPT | Performed by: PSYCHOLOGIST

## 2024-04-11 PROCEDURE — 90837 PSYTX W PT 60 MINUTES: CPT | Performed by: PSYCHOLOGIST

## 2024-04-11 NOTE — PROGRESS NOTES
"1 PM 47390 Indiv Psych for Schizoaffective Dx, Bipolar Type (60 MIN, 107-200)  Virtual. Supportive Therapy. Forgot Monday appt. Frustrated, periods of irritability, trying to work with pharmacologist to reduce. Frustrations with oldest son who brought home a cat, then cat had kittens in his room. Fearful of his actions, not listening, doesn't want him to get involved with authorities, fearful he'll end up going to juvenile. Didn't want cat but he didn't listen to her. Now considering getting rid of all of them. Youngest son continues to have outbursts, breaking stuff in bathroom because he doesn't want to shower. Doesn't want neighbor to complain. Isn't sure what to do. Difficulty getting them to comply. Now youngest (11) says he's bisexual. Tries to be accepting and supportive but not sure what to do. He tells her he likes \"thin boys\", boy who dressed up like a a girl. Believes both sons may be viewing porn. Recent transaction from bank, possibly linked to son's PS4, $500 less in account as a result. Frustrated, irritable. Also wants ot work on distorted boy image. \"I don't see what others see. If I look at photos I see a fat person.\"  Next: 1 week.   "

## 2024-04-17 ENCOUNTER — TELEMEDICINE (OUTPATIENT)
Dept: BEHAVIORAL HEALTH | Facility: CLINIC | Age: 44
End: 2024-04-17
Payer: COMMERCIAL

## 2024-04-17 DIAGNOSIS — F25.0 SCHIZOAFFECTIVE DISORDER, BIPOLAR TYPE (MULTI): ICD-10-CM

## 2024-04-17 PROCEDURE — 3061F NEG MICROALBUMINURIA REV: CPT | Performed by: PSYCHOLOGIST

## 2024-04-17 PROCEDURE — 90837 PSYTX W PT 60 MINUTES: CPT | Performed by: PSYCHOLOGIST

## 2024-04-18 ENCOUNTER — APPOINTMENT (OUTPATIENT)
Dept: PRIMARY CARE | Facility: CLINIC | Age: 44
End: 2024-04-18
Payer: COMMERCIAL

## 2024-04-18 NOTE — PROGRESS NOTES
"2PM 29271 Indiv Psych for Schizoaffective Dx, Bipolar Type (60 MIN, [id number])  Virtual. Supportive Therapy. Still irritable but less so. Elected to keep cats for now and take to [employer] when space frees up. Didn't want to be cruel. Discussed problems with youngest son, bowels, gets backed up, needs to maintain fiber/laxative. Oldest son does stuff to annoy youngest. Concerns about mother's demential and ability of being taken care of at home by her . Reported  is \"paranoid\" and wonders about a care worker coming in vs considering nursing home. Said she gave up her 20's, mother was an alcoholic. Patient's bf in hospital a few weeks ago for diabetes, heart, now wearing a heart monitor. Discussed some previous hx (e.g., divorce from ). Wonders if her diagnoses is correct, reading more about BPD/bipolar. Discussed consideration for driving again. Next: 1 week.   "

## 2024-04-24 ENCOUNTER — TELEMEDICINE (OUTPATIENT)
Dept: BEHAVIORAL HEALTH | Facility: CLINIC | Age: 44
End: 2024-04-24
Payer: COMMERCIAL

## 2024-04-24 DIAGNOSIS — F25.0 SCHIZOAFFECTIVE DISORDER, BIPOLAR TYPE (MULTI): ICD-10-CM

## 2024-04-24 PROCEDURE — 90837 PSYTX W PT 60 MINUTES: CPT | Performed by: PSYCHOLOGIST

## 2024-04-24 PROCEDURE — 3061F NEG MICROALBUMINURIA REV: CPT | Performed by: PSYCHOLOGIST

## 2024-04-24 NOTE — PROGRESS NOTES
"2 PM 82947 Indiv Psych for Schizoaffective Dx, Bipolar Type (60 MIN, 7982-943)  Virtual. Supportive Therapy. Discussed some recent med changes, thinks Caplyta and hydroxyzine may be helping some. Discussed how childhood experiences can shape adult behavior. Briefly mentioned bf having difficulties with is kids. Noted continued difficulties getting kids to help out at home. Mentioned learning how to \"camouflage\" her mental illness. \"I'm not happy.\" Mental illness since age 15, now 44. \"[place] of meds\", wants to feel better. Mentioned having SI in past but knows kids count on her, \"no where to go.\" Discussed youngst son today and relationship with his father, Tavon Lu.  him when in , to [place]. Found that he was cheating on her. Left alone in [place] to care for three kids, youngest, Tavon, only a month old. No financial support from . DCFS got involved. Difficult time. No contact with him in the past 5 years. [place] that he's still \"making babies\". Next: 1 week.   "

## 2024-04-25 PROBLEM — F41.1 GAD (GENERALIZED ANXIETY DISORDER): Status: RESOLVED | Noted: 2023-10-09 | Resolved: 2024-04-25

## 2024-04-25 NOTE — PROGRESS NOTES
"       [facility]  [facility]  [address]  [facility] [id number] [phone]     [facility]  New Visit Note      Subjective   Patient ID: Luca Delgado is a 44 y.o. female with past medical history significant for Overweight, Hypertension, diabetes, Vit D deficiency, Attention deficit disorder, unspecified< Anxiety, Depression, Nicotine dependence, and Sleep disorder breathing.     2024: The patient is here {pxarrival:28908} and was referred by family medicine Carly Nelson MD  for comprehensive sleep medicine evaluation due to {SleepCC:61087}. She has some concern for Obstructive sleep apnea, fatigue, and  long-term difficulties with sleep. ESS: OTF:  , and neck circumference is  inches  today.    HPI  {OSAhx:19655}      SLEEP STUDY HISTORY: (personally reviewed raw data such as interpretation report, data sheet, hypnogram, and titration table if available and applicable)  N/A    SLEEP-WAKE SCHEDULE  Bedtime: *** on weekdays, *** on weekends  Subjective sleep latency: ***  Problems falling asleep: ***  Number of awakenings: *** times per night spontaneously for unknown reasons  Falls back asleep in ***  Problems staying asleep: ***  Final wake time: *** on weekdays, *** on weekends  Out of bed time: *** on weekdays, *** on weekends  Shift work: ***  Naps: ***  Feels rested after a nap: {Yes/No:80949}  Average sleep duration (excluding naps): ***    SLEEP ENVIRONMENT  Sleep location: ***  Sleep status: {sleep status:53850}  Room is dark:  {Yes/No:55826::\"Yes\"}  Room is quiet: {Yes/No:58641::\"Yes\"}  Room is cool: {Yes/No:46637::\"Yes\"}  Bed comfort: good    SLEEP HABITS:   Activities before bedtime: ***  Activities in bed: ***  Preferred sleep position: {Sleep position:89713}    SLEEP ROS:  Night symptoms: {sleep apnea ROS at night:54982}  Morning symptoms: {sleep apnea ROS in mornin}  Daytime " symptoms {sleep apnea ROS at daytime:87852}  Hypersomnia / narcolepsy symptoms: {narcolepsy ROS:62814}  RLS symptoms: {RLS symptoms:33720}  Movements in sleep: {sleep movements:29748}  Parasomnia symptoms: {parasomnia ROS:54613}    WEIGHT: {weight:80727}    REVIEW OF SYSTEMS: All other systems have been reviewed and are negative.    PERTINENT SOCIAL HISTORY:  Occupation: employment status:02614}  Smoking: {Yes/No:51420}  ETOH: {Yes/No:77445}  Marijuana: {Yes/No:76731}  Caffeine: ***  Sleep aids: {Yes/No:85330}  Claustrophobia: {Yes/No:91238}    PERTINENT PAST SURGICAL HISTORY:  {surgical history pertinent in sleep:53910}    PERTINENT FAMILY HISTORY:  {family history in sleep:68614}    Active Problems, Allergy List, Medication List, and PMH/PSH/FH/Social Hx have been reviewed and reconciled in chart. No significant changes unless documented in the pertinent chart section. Updates made when necessary.       Objective     Physical Exam  Constitutional:alert and oriented to time, place, and person  Sinus: *** tenderness to palpation  Palate: Normal / Narrow / High-arched / *** torus palatini  Mallampati ***, *** Tongue scalloping, *** macroglossia  Lungs: Clear to auscultation bilateral, no rales  Heart: Regular rate and rhythm, no murmurs    Assessment/Plan   Luca Delgado is a 44 y.o. female who is seen to evaluate for ***possible/severe/ moderate/mild obstructive sleep apnea. The pathophysiology of sleep apnea, diagnostic testing (HST vs PSG), treatment options (PAP, oral appliance, surgery, hypoglossal nerve stimulator called Inspire), and supportive management (weight loss, positional therapy, smoking cessation, avoidance of alcohol and sedatives) were discussed with the patient in detail. Risk factors of sleep apnea as well as cardiometabolic and neurocognitive sequelae associated with untreated sleep apnea were also discussed. Lastly, patient was advised to avoid driving vehicle or operating heavy machinery when  sleepy.     Luca Delgado with the following problems:     # SLEEP DISORDERED BREATHING:  -This is likely sleep apnea based on the the history and physical examination.   -Luca Ortizs not yet had a sleep study.  -Instruct patient to complete home/ in lab/ split night/ titration sleep study.  -HSAT is reasonable as patient likely has ALFRED based on history and exam and does not have any of the following comorbidities: CHF, neuromuscular weakness, hypoventilation, or significant COPD.  -We consider treatment as indicated when testing is complete.     # SLEEP APNEA:  -Per CMS criteria, the patient is not eligible for a pap to treat her Obstructive sleep apnea.   -Start/ Continue [] cmH20 []PAP through [facility].  -Sleep apnea and PAP therapy education were provided at length in the clinic today. Luca Delgado verbalized understanding.  -Emphasized diet, exercise, and weight loss in the clinic, as were non-supine sleep, avoiding alcohol in the late evening, and driving or operating heavy machinery when sleepy.  Luca Delgadoverbalizes understanding of the above instructions and risks.    # BARIATRIC SURGERY INSTRUCTION:  -Please continuous using your CPAP to treat your sleep apnea.  -Bring your PAP and all equipment with you to surgery.  -Use your PAP with surgery and during recovery.  -Keep your head of the bed at 30* or higher.  -I advise judicious use of pain medications post operatively as pain medications can make sleep apnea worse.    -I advise close monitoring of the patient post operatively due to increased risk of complications related to sleep apnea.   -Luca Delgado may be at higher risk of postoperative respiratory complications.Luca Delgado understands the risk of post operative complications are higher for Luca Delgado is at increased but not prohibitive risk due to ALFRED.   -Luca Delgado is optimized on PAP therapy for sleep apnea as long as Luca Delgado is compliant with  PAP use per most recent download.  -Luca Delgado verbalizes understanding of the above instructions and risks.     # CHRONIC SLEEP ONSET/ SLEEP MAINTENANCE INSOMNIA:  -likely due to poor sleep hygiene, irregular sleep schedule, depression, anxiety, untreated sleep apnea, nocturia, RLS, delayed sleep phase syndrome, and chronic pain. [Meds] may be a contributing factor as well.  -OTF:    # DEPRESSION and ANXIETY:   -Luca Delgadois taking [] and participating in psychotherapy.  -Denies HI/SI     # HYPERTENSION/ ATRIAL FIBRILLATION/ CAD/ CHF:  -Blood pressure was controlled today. To control her blood pressure better, instruct the patient to take anti-hypertension medication at night and a water pill in the morning.  -Denies headache, palpitation, and syncope in the clinic.  -Last Echo:  -Follows with PCP/ Cardiology     # MORBID OBESITY/OBESITY /OVERWEIGHT:  -with a BMI of []. Luca Delgado most recent Bicarbonate was   Bicarbonate   Date Value Ref Range Status   01/29/2024 30 21 - 32 mmol/L Final      -Encourage to have regular exercise to manage weight well.  -Refer to a nutritionist for weight control.  -Bariatric surgery candidate.    # NASAL CONGESTION:  -Instruct Luca Tuttle use appropriate Flonase spray to ease congestion.  -Refer to Otolaryngology for underlying investigation.    # XEROSTOMIA:  -Instruct Luca Tuttle purchase the Biotene gel to ease the dry mouth symptom,     # TOBACCO ABUSE:Luca Delgado is a current [] PPD smoker for [] years.  -Smoking Cessation given  -Decline Smoking Cessation     # RESTLESS LEG SYNDROME: This occurs frequently.  Iron (ug/dL)   Date Value   01/29/2024 114   07/15/2019 58     % Saturation (%)   Date Value   01/29/2024 32     Iron Saturation (%)   Date Value   07/15/2019 16 (L)     TIBC (ug/dL)   Date Value   01/29/2024 361   07/15/2019 370     Ferritin (ng/mL)   Date Value   01/29/2024 42     We will check a ferritin level today and start OTC iron replacement to  ferritin of >75 as indicated.  Caffeine []. Eliminate  Tobacco []. Smoking cessation counseling provided.  Luca Wing I discussed December Mackcurrent medications that could be exacerbating December Mack symptoms. Will continue [] for now.  Associated diseases [] and response [].  Pramipexole  Ropinirole  Rotigitine  Gabapentin  Pregabalin  Opioids  Benzos     # CIRCADIAN RHYTHM SLEEP-WAKE DISORDER:  -We will obtain sleep logs for two weeks to be brought to next clinic to discuss. We will consider actigraphy to compare and/or confirm sleep log findings.  Delayed Sleep Phase:   -Set wake time, light therapy in the morning.   -Sleep hygiene measures at set bedtime.  Advanced Sleep Phase:   -Set bedtime and light therapy in the evening.   -Sleep hygiene measures at set bedtime.  Irregular Sleep Phase:  -Set bedtime and wake time.   -Daytime routine including scheduled physical activity, social activity and meal times.  Non-24 Sleep-Wake Rhythm:  -Set bedtime and wake time.   -Daytime routine including scheduled physical activity, social activity and meal times.  Shift Work:  -Maximize sleep time to 7-8 hours.   -Make sure your room is dark, quiet, cool and interruptions are eliminated.   -Avoid light in the mornings, wear dark sunglasses driving home.   -Expose yourself to bright light or daylight upon waking.   -Avoid caffeine 8 hours prior to sleeping.   -We will consider modafinil, a mild stimulant, if these strategies are not effective.  Jet Lag:  -Try to change your sleep/wake schedule two to three days prior to travel.   -Expose yourself to bright light when you want to be awake.   -Avoid bright light and electronic light when you are nearing time to sleep.   -You can take melatonin OTC as needed 1 hour prior to bedtime as needed.     # SLEEPWALKING/ SLEEP EATING/ REM BEHAVIOR DISORDER:  -Instruct Luca Delgado to make sure self and bed partner are safe.  -Instruct Luca Tuttle lock bedroom doors and  windows.  -Instruct Luca Tuttle hide his/her car keys.  -Instruct Luca Tuttle pad headboard and move furniture away from the bed.  -Instruct Luca Delgado to put pillows in between him/her with bed partner if kicking or hitting. Consider sleeping separately.  -Instruct Luca Delgado to remove clutter and furniture from bedroom floor to avoid injury. Consider moving Luca Delgado mattress to the floor.  -Instruct Luca Delgado to advise bed partner to calmly lead you back to bed with a gentle voice. Avoid touching and grabbing.  -Instruct Luca Delgado to  find evidence of sleep eating, especially if he/she is gaining weight, consider locking Luca Delgado fridge and pantry at night.  -We will consider a trial of low dose clonazepam 0.5 mg nightly.     #IDIOPATHIC HYPERSOMNIA VS. NARCOLEPSY:  - Will order an overnight sleep study, followed by MSLT the next day  - Perform urine toxicology prior to sleep study.  - Will call patient within 3 weeks after the test. Will discuss follow up plan at that time.  - May consider checking TSH and iron studies to rule out underlying metabolic etiologies.  -Scheduled naps  -Consolidate night time sleep.     *An OARRS report was reviewed and is consistent with prescribed medications.   *Considered the risks of abuse, dependence, addiction, and diversion and [] medication is felt to be clinically appropriate based on documented diagnosis.  *A controlled substance agreement was reviewed and signed today in clinic.   *Pending scanned copy in to the chart per office staff.    RTC      All of patient's questions were answered. She verbalizes understanding and agreement with my assessment and plan.

## 2024-05-01 ENCOUNTER — TELEMEDICINE (OUTPATIENT)
Dept: BEHAVIORAL HEALTH | Facility: CLINIC | Age: 44
End: 2024-05-01
Payer: COMMERCIAL

## 2024-05-01 ENCOUNTER — APPOINTMENT (OUTPATIENT)
Dept: SLEEP MEDICINE | Facility: HOSPITAL | Age: 44
End: 2024-05-01
Payer: COMMERCIAL

## 2024-05-01 DIAGNOSIS — F25.0 SCHIZOAFFECTIVE DISORDER, BIPOLAR TYPE (MULTI): ICD-10-CM

## 2024-05-01 PROCEDURE — 3061F NEG MICROALBUMINURIA REV: CPT | Performed by: PSYCHOLOGIST

## 2024-05-01 PROCEDURE — 90834 PSYTX W PT 45 MINUTES: CPT | Performed by: PSYCHOLOGIST

## 2024-05-01 NOTE — PROGRESS NOTES
"12 PM 75477 Indiv Psych for Schizoaffective Dx, Bipolar Type (45 MIN, 4395-9602)  Virtual. Supportive Therapy. Spent most of time discussing issues regarding youngest son, Tavon. Concerned about son saying he's bisexual/felipe, attracted to boys. Tells him she'll love him regardless. Also, concerns with his having autistic spectrum disorder playing into confusion. He's meeting with a therapist. Also, sees a pharmacologist. Hopes to get him back to [facility] counseling. Med provider says he's maxed out on meds. Has talked to school regarding his IEP. Still concerns they're focused on behavior and she's concerned with how far behind he is academically. Recently, a girl called him \"retarded\" at school which upset him. He continues to have tantrums at school and home. Trying to get him more compliant with daily showers. Next; 2 weeks.   "

## 2024-05-08 ENCOUNTER — TELEMEDICINE (OUTPATIENT)
Dept: BEHAVIORAL HEALTH | Facility: CLINIC | Age: 44
End: 2024-05-08
Payer: COMMERCIAL

## 2024-05-08 DIAGNOSIS — F25.0 SCHIZOAFFECTIVE DISORDER, BIPOLAR TYPE (MULTI): ICD-10-CM

## 2024-05-08 PROCEDURE — 3061F NEG MICROALBUMINURIA REV: CPT | Performed by: PSYCHOLOGIST

## 2024-05-08 PROCEDURE — 90837 PSYTX W PT 60 MINUTES: CPT | Performed by: PSYCHOLOGIST

## 2024-05-08 NOTE — PROGRESS NOTES
"2 PM 80810 Indiv Psych for Schizoaffective Dx, Bipolar Type (60 MIN, 207-300)  Virtual. Supportive Therapy.  Spent most of time discussing stress involved in dealing with her three kids. \"Depressed.\" Youngest continues to have meltdowns, doesn't want to shower, has outbursts. Oldest also had outburst, broke IPAD with ball bat. He's not taking his antidepressant. Daughter, who turns 18 in Oct, continues to be rebellious and have a smart mouth. Patient saddened, crying, seeing what youngest son has to do. Wonders if bullying has impacted his wanting to shower. Patient trying to prioritize kids over self. Discussed importance of taking care of herself in process. Next: 1 week.   "

## 2024-05-15 ENCOUNTER — TELEMEDICINE (OUTPATIENT)
Dept: BEHAVIORAL HEALTH | Facility: CLINIC | Age: 44
End: 2024-05-15
Payer: COMMERCIAL

## 2024-05-15 DIAGNOSIS — F25.0 SCHIZOAFFECTIVE DISORDER, BIPOLAR TYPE (MULTI): ICD-10-CM

## 2024-05-15 PROCEDURE — 3061F NEG MICROALBUMINURIA REV: CPT | Performed by: PSYCHOLOGIST

## 2024-05-15 PROCEDURE — 90837 PSYTX W PT 60 MINUTES: CPT | Performed by: PSYCHOLOGIST

## 2024-05-15 NOTE — PROGRESS NOTES
"21 PM 99388 Indiv Psych for Schizoaffective Dx, Bipolar Type (60 MIN, 2226-624)  Virtual. Supportive Therapy. Trying to take things one step at a time. Wants to get into better sleep routine, not sleep during the day and limit nap time. Bf is incarcerated again, 30 days, unclear of for contempt of court, not sure. Upset at him, called her from residential, then upset asked \"are you stupid?\" This angered her. \"I'm not you're punching bag.\" Discussed difficulties she's having with new teeth/dentures. Hopes to get an appointment to get them fixed so she can wear them. Next: 1 week.   "

## 2024-05-20 ENCOUNTER — OFFICE VISIT (OUTPATIENT)
Dept: RHEUMATOLOGY | Facility: CLINIC | Age: 44
End: 2024-05-20
Payer: COMMERCIAL

## 2024-05-20 VITALS
HEIGHT: 68 IN | HEART RATE: 94 BPM | WEIGHT: 195 LBS | DIASTOLIC BLOOD PRESSURE: 86 MMHG | BODY MASS INDEX: 29.55 KG/M2 | SYSTOLIC BLOOD PRESSURE: 132 MMHG

## 2024-05-20 DIAGNOSIS — R76.8 SS-A ANTIBODY POSITIVE: ICD-10-CM

## 2024-05-20 DIAGNOSIS — L65.9 ALOPECIA: Primary | ICD-10-CM

## 2024-05-20 PROCEDURE — 3075F SYST BP GE 130 - 139MM HG: CPT | Performed by: STUDENT IN AN ORGANIZED HEALTH CARE EDUCATION/TRAINING PROGRAM

## 2024-05-20 PROCEDURE — 99204 OFFICE O/P NEW MOD 45 MIN: CPT | Performed by: STUDENT IN AN ORGANIZED HEALTH CARE EDUCATION/TRAINING PROGRAM

## 2024-05-20 PROCEDURE — 3061F NEG MICROALBUMINURIA REV: CPT | Performed by: STUDENT IN AN ORGANIZED HEALTH CARE EDUCATION/TRAINING PROGRAM

## 2024-05-20 PROCEDURE — 3079F DIAST BP 80-89 MM HG: CPT | Performed by: STUDENT IN AN ORGANIZED HEALTH CARE EDUCATION/TRAINING PROGRAM

## 2024-05-20 RX ORDER — DICLOFENAC SODIUM 10 MG/G
4 GEL TOPICAL 4 TIMES DAILY
Qty: 100 G | Refills: 1 | Status: SHIPPED | OUTPATIENT
Start: 2024-05-20

## 2024-05-20 ASSESSMENT — PAIN SCALES - GENERAL: PAINLEVEL: 0-NO PAIN

## 2024-05-20 NOTE — PATIENT INSTRUCTIONS
-Please make sure you get sleep studies done  -[facility] referral   -Continue seeing your mental health specialist   -Keep your mouth hydrated  -Voltaren gel for the knees   -follow up with Dermatology regarding your alopecia   -Please let us know if you develop new or worsening symptoms in the future

## 2024-05-20 NOTE — PROGRESS NOTES
Subjective   Patient ID: [id number]   Luca Delgado is a 44 y.o. female who presents for No chief complaint on file..  HPI    45 yo female with HTN, hypothyroid, hx of thyroid cancer s/p resection, prediabetes, is here for fatigue evaluation.     Has been chronically fatigued. Had her YAW test checked and came back positive.   Sleep is not great.   She thinks she snores.   New medications adjusted recently.     Reports:  Hair loss, told she has alopecia thought it was fungal.   No ocular inflammation. No dry eyes. No nose or mouth ulcers.  Dry mouth three times a day. Daily. Stays hydrated.   Trouble swallowing. Attributed to thick secretions.   Cough, with phlegm, yellowish.   No SOB. Intermittent chest pain. PPI helps.   No hx of pleuritis or pericarditis.   No raynauds.  No skin rashes  No photosentivity   Arthralgias in the knees. Twice weekly. Sometimes with activity or rest, Doesn't wake her up at night. Morning stiffness 5 mins.   Has been going on for 3 months.  No GI or  sxs  No fever or night sweats   Tingling R>L all 5 fingers 1 month hx   Weight loss 15-20 pds over a span of a year  No glandular swelling      Workup:  CMP,CRP, Ferritin WNL  Hb 11.7,low MCV  YAW 1:320, SSA>8  UA neg    Smoker, no alcohol use, no drug use  No family hx of rheumatic diseases  Allergy to penicillin and buspar       Objective   Physical Exam  AO*4  Clear lungs  NlS1S2 RRR  No active synovitis in joints  Knees cool to touch and non tender  Oral cavity with poor salivary pooling and no dentition  No glandular swelling  Alopecia on the left side    Assessment/Plan   45 yo female with HTN, hypothyroid, hx of thyroid cancer s/p resection, prediabetes, is here for fatigue evaluation.   Has been chronically fatigued. Had her YAW test checked and came back positive with positive SSA.  Discussed extensively her symptoms and test results.  Discussed that her dry mouth and fatigue can be a symptom of Sjogren Syndrome however she does  have other causes for that as well such as poor sleep, snoring, stressors in life, drugs she's on etc.  Discussed that even if her symptoms are secondary to Ssd, immunosuppression has no role in treating that.    Plan:  -Sleep medicine, rule out ALFRED  -[facility]   -mental health follow up  -Dermatology follow up for alopecia  -oral hydration   -voltaren gel for the knees  -Patient to let us know if she develops new or worsening symptoms   -I ordered blood work for completion, to be done in the near future including C3 C4 RF SPEP HIV    RTC as needed in the future       Yoli Monteiro MD   Rheumatology Fellow,PGY-V    Patient seen with Dr. Melendez

## 2024-05-22 ENCOUNTER — TELEMEDICINE (OUTPATIENT)
Dept: BEHAVIORAL HEALTH | Facility: CLINIC | Age: 44
End: 2024-05-22
Payer: COMMERCIAL

## 2024-05-22 DIAGNOSIS — F25.0 SCHIZOAFFECTIVE DISORDER, BIPOLAR TYPE (MULTI): ICD-10-CM

## 2024-05-22 PROCEDURE — 90837 PSYTX W PT 60 MINUTES: CPT | Performed by: PSYCHOLOGIST

## 2024-05-22 PROCEDURE — 3061F NEG MICROALBUMINURIA REV: CPT | Performed by: PSYCHOLOGIST

## 2024-05-22 NOTE — PROGRESS NOTES
"2 PM 87252 Indiv Psych for Schizoaffective Dx, Bipolar Type (60 MIN, [phone])  Virtual. Supportive Therapy. Discussed emotional management as it relates to kids' behavior. Continues to be frustrated with youngest son's behavior. Observed today, trim from ceiling falling, likely because of him slamming doors when having tantrums. This makes her very angry. Talked about \"tough love\" being employed. He continues to have tantrums and also refusing to get showers. Discussed oldest son, who will see her cry and is unsympathetic. Said he has a lot in common with his father. Also has migraines. Most of time spent discussing this and her keeping herself under control and not over-reacting. Next: 1 week.   "

## 2024-05-29 ENCOUNTER — TELEMEDICINE (OUTPATIENT)
Dept: BEHAVIORAL HEALTH | Facility: CLINIC | Age: 44
End: 2024-05-29
Payer: COMMERCIAL

## 2024-05-29 DIAGNOSIS — F25.0 SCHIZOAFFECTIVE DISORDER, BIPOLAR TYPE (MULTI): ICD-10-CM

## 2024-05-29 PROCEDURE — 90834 PSYTX W PT 45 MINUTES: CPT | Performed by: PSYCHOLOGIST

## 2024-05-29 PROCEDURE — 3061F NEG MICROALBUMINURIA REV: CPT | Performed by: PSYCHOLOGIST

## 2024-05-30 NOTE — PROGRESS NOTES
"11 AM 08648 Ind Psych for Schizoaffective Dx, Bipolar Type (45 MIN, [phone])  Virtual. Supportive Therapy. Not feeling good, \"feel weird, displaced.\" Was off meds some, fearful of not being able to take care of niece and also because youngest son gets up at night. Back on Caylyta but feeling more anxious. Plans on scheduling a meeting with her pharmacologist. Today is last day of kids' school, allowed them off today. Thinking ahead to summer. Trying to find some places she might get respite ([facility]). Daughter's friend, killed in auto accident recently, one of four persons in vehicle. Trying to understand how this happened. No summer programming for kids. Discussed her getting on a routine that includes walking. Next: 1 week.   "

## 2024-06-05 ENCOUNTER — TELEMEDICINE (OUTPATIENT)
Dept: BEHAVIORAL HEALTH | Facility: CLINIC | Age: 44
End: 2024-06-05
Payer: COMMERCIAL

## 2024-06-05 DIAGNOSIS — F25.0 SCHIZOAFFECTIVE DISORDER, BIPOLAR TYPE (MULTI): ICD-10-CM

## 2024-06-05 PROCEDURE — 90834 PSYTX W PT 45 MINUTES: CPT | Performed by: PSYCHOLOGIST

## 2024-06-05 PROCEDURE — 3061F NEG MICROALBUMINURIA REV: CPT | Performed by: PSYCHOLOGIST

## 2024-06-05 NOTE — PROGRESS NOTES
11 AM 93287 Ind Psych for Schizoaffective Dx, Bipolar Type (45 MIN, [phone])  Virtual. Supportive Therapy. Spent most of time discussing frustration with kids, mostly youngest, Tavon. Still refusing to shower despite bowel problems and smell. Patient has taken away access to technology as a consequence. Has reached out to his grandfather to spend time with him, said he is more defiant with females. Oldest son got extensive dental work done, veneers, root canal, wants him to do better with consistent dental care. Going with daughter to  of friend. Also, found she can utilize [facility] for respite with son, a week out of 3 months. Next: 1 week.

## 2024-06-12 ENCOUNTER — APPOINTMENT (OUTPATIENT)
Dept: BEHAVIORAL HEALTH | Facility: CLINIC | Age: 44
End: 2024-06-12
Payer: COMMERCIAL

## 2024-06-12 DIAGNOSIS — E78.5 HYPERLIPIDEMIA, UNSPECIFIED HYPERLIPIDEMIA TYPE: ICD-10-CM

## 2024-06-12 DIAGNOSIS — F25.0 SCHIZOAFFECTIVE DISORDER, BIPOLAR TYPE (MULTI): ICD-10-CM

## 2024-06-12 PROCEDURE — 90834 PSYTX W PT 45 MINUTES: CPT | Performed by: PSYCHOLOGIST

## 2024-06-12 PROCEDURE — 3061F NEG MICROALBUMINURIA REV: CPT | Performed by: PSYCHOLOGIST

## 2024-06-12 NOTE — TELEPHONE ENCOUNTER
Marcella Johnson called requesting refill of pt atorvastatin 40mg, Qty 90. Order pended and routed to provider.

## 2024-06-13 ENCOUNTER — APPOINTMENT (OUTPATIENT)
Dept: SLEEP MEDICINE | Facility: HOSPITAL | Age: 44
End: 2024-06-13
Payer: COMMERCIAL

## 2024-06-13 ENCOUNTER — TELEPHONE (OUTPATIENT)
Dept: BEHAVIORAL HEALTH | Facility: CLINIC | Age: 44
End: 2024-06-13
Payer: COMMERCIAL

## 2024-06-13 RX ORDER — ATORVASTATIN CALCIUM 40 MG/1
40 TABLET, FILM COATED ORAL DAILY
Qty: 90 TABLET | Refills: 3 | Status: SHIPPED | OUTPATIENT
Start: 2024-06-13 | End: 2025-06-13

## 2024-06-13 NOTE — PROGRESS NOTES
"12 PM 79352 Indiv Psych for Schizoaffective Dx, Bipolar Type (45 MIN, 8942-1558)  Virtual. Supportive Therapy. Spent most of session discussing frustration with youngest son and some with oldest son. Trying to identify family members (e.g., son's father, grandfather, mom) where she can take son to get a break. Son continues to not take a bath/shower. She discussed son with someone she met at pool who discussed a family member with autistic spectrum disorder who acts out sexually in public. Able to see others can have it hard as well. \"Limited options\" with son. Discussed her own mental health history and struggles. Next: 1 week.   "

## 2024-06-14 ENCOUNTER — DOCUMENTATION (OUTPATIENT)
Dept: BEHAVIORAL HEALTH | Facility: CLINIC | Age: 44
End: 2024-06-14
Payer: COMMERCIAL

## 2024-06-14 DIAGNOSIS — F25.0 SCHIZOAFFECTIVE DISORDER, BIPOLAR TYPE (MULTI): ICD-10-CM

## 2024-06-14 RX ORDER — LUMATEPERONE 10.5 MG/1
10.5 CAPSULE ORAL DAILY
Qty: 30 CAPSULE | Refills: 0 | Status: SHIPPED | OUTPATIENT
Start: 2024-06-14 | End: 2024-07-14

## 2024-06-14 NOTE — PROGRESS NOTES
Nonbillable note : sent order for caplyta to pharmacy and communicated with staff related to this , after received communication from patient about not tolerating hydroxyzine , reports memory loss , though med review does not show hydroxyzine on med list in the [facility] emr system . Patient indicated anxiety is high , reviewed therapy notes in the [facility] emr . Will see if adding caplyta back on indirectly improves anxiety before determining future med potential regimen changes . Kpacer cns

## 2024-06-17 DIAGNOSIS — E03.9 HYPOTHYROIDISM, UNSPECIFIED TYPE: ICD-10-CM

## 2024-06-17 RX ORDER — LEVOTHYROXINE SODIUM 150 UG/1
150 TABLET ORAL DAILY
Qty: 90 TABLET | Refills: 0 | Status: SHIPPED | OUTPATIENT
Start: 2024-06-17 | End: 2025-06-17

## 2024-06-19 ENCOUNTER — TELEMEDICINE (OUTPATIENT)
Dept: BEHAVIORAL HEALTH | Facility: CLINIC | Age: 44
End: 2024-06-19
Payer: COMMERCIAL

## 2024-06-19 ENCOUNTER — APPOINTMENT (OUTPATIENT)
Dept: BEHAVIORAL HEALTH | Facility: CLINIC | Age: 44
End: 2024-06-19
Payer: COMMERCIAL

## 2024-06-19 DIAGNOSIS — F25.0 SCHIZOAFFECTIVE DISORDER, BIPOLAR TYPE (MULTI): ICD-10-CM

## 2024-06-19 DIAGNOSIS — F41.1 GAD (GENERALIZED ANXIETY DISORDER): ICD-10-CM

## 2024-06-19 DIAGNOSIS — F98.8 ATTENTION DEFICIT DISORDER (ADD) WITHOUT HYPERACTIVITY: ICD-10-CM

## 2024-06-19 DIAGNOSIS — G47.9 SLEEP DIFFICULTIES: ICD-10-CM

## 2024-06-19 DIAGNOSIS — F43.9 TRAUMA AND STRESSOR-RELATED DISORDER: ICD-10-CM

## 2024-06-19 PROCEDURE — 99213 OFFICE O/P EST LOW 20 MIN: CPT | Performed by: CLINICAL NURSE SPECIALIST

## 2024-06-19 PROCEDURE — 3061F NEG MICROALBUMINURIA REV: CPT | Performed by: CLINICAL NURSE SPECIALIST

## 2024-06-19 PROCEDURE — 90837 PSYTX W PT 60 MINUTES: CPT | Performed by: PSYCHOLOGIST

## 2024-06-19 PROCEDURE — 3061F NEG MICROALBUMINURIA REV: CPT | Performed by: PSYCHOLOGIST

## 2024-06-19 NOTE — PROGRESS NOTES
"11 AM 36051 Indiv Psych for Schizoaffective Dx, Bipolar Type (60 MIN, 2480-6620)  Virtual. Supportive Therapy. Discussed some issues she's having with meds, will discuss with pharmacologist. Since caring for niece/son, at times concerned with taking caylyta, now plans on re-starting at lower dose since she hasn't been taking. Anxiety and depression worse. Wants to discuss meds for anxiety with her pharmacologist. \"Really depressed, feel like being displaced, real spacey.\" Dealing with kids behavior. Spent most of time discussing this. Oldest son, Murphy, gone from home, said he wasn't coming home, lied and said he was at her sister's in [place] when actually at mom's/step-dad's home. Frustrated with him but also with step-father who she doesn't like and who doesn't treat her well. He would not support bringing her son back to home. She threatened to call the police and may still do this. \"He said he didn't care if I called the police.\" May file restraining order and likely will prevent sons from going there. Also, concerned with mom's deteriorating health. Boyfriend reached out to son and encouraged him to comply with mother's wishes to come home. Mom has been with step-father since patient was 20. Trying to get more services for son including [facility] assessment (Fri). Hopes to go back to school at some point. Didn't discuss youngest son today. Next: 1 week.   "

## 2024-06-19 NOTE — PROGRESS NOTES
Outpatient Psychiatry      Subjective     December Danny, a 44 y.o. female, presents for follow up for outpatient psychiatry /medication management appointment as an established patient for audio/visual , she consents to the appointment       Diagnosis:   Schizoaffective disorder (295.70) (F25.9) bipolar type primary diagnoses ( moderate)   Sleep difficulties ( mild)   Trauma and stressor-related disorder ( mild )  ESTUARDO (generalized anxiety disorder) ( moderate)   Add diagnoses ( mild )      Treatment Plan/Recommendations: 4-6 weeks follow up , can continue self care and wellness efforts and maintain routine health screenings.can call  for treatment and scheduling concerns. continue psychotropic medications : benztropine 1 mg 1 tablet twice a day for eps , restlessness , lamotrigine 200 mg , twice a day for mood stabililization /schitzoaffective disorder , bupropion xl 150 mg , daily and 300 mg , xl daily for add , and depression symptoms , caplyta 10.5 mg daily for schitzoaffective disorder     Review with patient: Treatment plan reviewed with the patient.  Medication risks/benefit reviewed with the patient    HPI:  denies issues with substance abuse   no thoughts of harming herself or others   reconciled and reviewed medication list in the [facility] emr and provided psychoeducation   support provided  reviewed and reconciled medication list in the [facility] emr   she spoke about stressors and intends to continue therapy   says mood can get anxious and depressed over stressors , support provided    Tolerating medications   She gets periods of times she gets suicidal thoughts , says she would not act on them , she tries to use distraction , she thinks of how this would affect her kids negatively   Past Psych med history :  tried Prozac, Zoloft, Paxil, Luvox, lithium - side effect? Depakote, Neurontin? Zyprexa, Seroquel - too sedation, Geodon - too sedation; Latuda - not sure; Vraylar, Abilify     Current  Outpatient Medications:     amLODIPine (Norvasc) 10 mg tablet, Take 1 tablet (10 mg) by mouth once daily., Disp: 90 tablet, Rfl: 3    atorvastatin (Lipitor) 40 mg tablet, Take 1 tablet (40 mg) by mouth once daily. For your elevated cholesterol levels., Disp: 90 tablet, Rfl: 3    benztropine (Cogentin) 1 mg tablet, Take 1 tablet (1 mg) by mouth 2 times a day., Disp: 180 tablet, Rfl: 0    buPROPion XL (Wellbutrin XL) 150 mg 24 hr tablet, Take 1 tablet (150 mg) by mouth once daily. Do not crush, chew, or split., Disp: 90 tablet, Rfl: 0    buPROPion XL (Wellbutrin XL) 300 mg 24 hr tablet, Take 1 tablet (300 mg) by mouth once daily in the morning. Take before meals., Disp: 90 tablet, Rfl: 0    cholecalciferol (Vitamin D-3) 50 MCG (2000 UT) tablet, TAKE 1 TABLET BY MOUTH EVERY DAY, Disp: 90 tablet, Rfl: 0    Daily-Jarocho, with folic acid, 400 mcg tablet, Take 1 tablet by mouth once daily., Disp: 90 each, Rfl: 3    diclofenac sodium (Voltaren) 1 % gel, Apply 4.5 inches (4 g) topically 4 times a day., Disp: 100 g, Rfl: 1    ibuprofen 800 mg tablet, Take by mouth., Disp: , Rfl:     lamoTRIgine (LaMICtal) 200 mg tablet, Take 1 tablet (200 mg) by mouth 2 times a day., Disp: 180 tablet, Rfl: 0    levothyroxine (Synthroid, Levoxyl) 150 mcg tablet, Take 1 tablet (150 mcg) by mouth once daily., Disp: 90 tablet, Rfl: 0    loratadine (Claritin) 10 mg tablet, Take by mouth., Disp: , Rfl:     lumateperone (Caplyta) 10.5 mg capsule, Take 10.5 mg by mouth once daily., Disp: 30 capsule, Rfl: 0    omeprazole (PriLOSEC) 40 mg DR capsule, Take 1 capsule (40 mg) by mouth once daily in the morning. Take before meals., Disp: 90 capsule, Rfl: 3    Record Review: brief     Vitals:  There were no vitals filed for this visit.    Reba Stauffer, APRN-CNS

## 2024-06-26 ENCOUNTER — APPOINTMENT (OUTPATIENT)
Dept: BEHAVIORAL HEALTH | Facility: CLINIC | Age: 44
End: 2024-06-26
Payer: COMMERCIAL

## 2024-07-01 ENCOUNTER — APPOINTMENT (OUTPATIENT)
Dept: BEHAVIORAL HEALTH | Facility: CLINIC | Age: 44
End: 2024-07-01
Payer: COMMERCIAL

## 2024-07-02 ENCOUNTER — APPOINTMENT (OUTPATIENT)
Dept: BEHAVIORAL HEALTH | Facility: CLINIC | Age: 44
End: 2024-07-02
Payer: COMMERCIAL

## 2024-07-03 ENCOUNTER — APPOINTMENT (OUTPATIENT)
Dept: BEHAVIORAL HEALTH | Facility: CLINIC | Age: 44
End: 2024-07-03
Payer: COMMERCIAL

## 2024-07-03 DIAGNOSIS — F25.0 SCHIZOAFFECTIVE DISORDER, BIPOLAR TYPE (MULTI): ICD-10-CM

## 2024-07-03 PROCEDURE — 90837 PSYTX W PT 60 MINUTES: CPT | Performed by: PSYCHOLOGIST

## 2024-07-03 PROCEDURE — 3061F NEG MICROALBUMINURIA REV: CPT | Performed by: PSYCHOLOGIST

## 2024-07-03 NOTE — PROGRESS NOTES
11 AM 86879 Indiv Psych for Schizoaffective Dx, Bipolar Type (60 MIN, 8067-3161)  Virtual. Supportive Therapy. Inquired about some schools that specialize in autism, but steffanie be costly (tuition $90K), even with scholarship, too expensive, not realistic. Also, inquired about [facility] program. Feeling desperate to find best care for son. Contacted school and needs referral if he were to be going to a different school. Reached out to [employer] rep (head of special ed). Doesn't feel he's getting services promised on IEP (e.g.., OT, PT, speech). Frustrated. Reported she applied for social security for him, denied last time, plans on applying again. Continues to have some problems with other kids as well. Wants to be test for autism. Reported her son has, as well as his father. Next: 1 week.

## 2024-07-10 ENCOUNTER — TELEMEDICINE (OUTPATIENT)
Dept: BEHAVIORAL HEALTH | Facility: CLINIC | Age: 44
End: 2024-07-10
Payer: COMMERCIAL

## 2024-07-10 ENCOUNTER — APPOINTMENT (OUTPATIENT)
Dept: BEHAVIORAL HEALTH | Facility: CLINIC | Age: 44
End: 2024-07-10
Payer: COMMERCIAL

## 2024-07-10 DIAGNOSIS — G25.9 EXTRAPYRAMIDAL REACTION: ICD-10-CM

## 2024-07-10 DIAGNOSIS — F25.0 SCHIZOAFFECTIVE DISORDER, BIPOLAR TYPE (MULTI): ICD-10-CM

## 2024-07-10 DIAGNOSIS — F33.1 DEPRESSION, MAJOR, RECURRENT, MODERATE (MULTI): ICD-10-CM

## 2024-07-10 PROCEDURE — 3061F NEG MICROALBUMINURIA REV: CPT | Performed by: CLINICAL NURSE SPECIALIST

## 2024-07-10 PROCEDURE — 99213 OFFICE O/P EST LOW 20 MIN: CPT | Performed by: CLINICAL NURSE SPECIALIST

## 2024-07-10 RX ORDER — LUMATEPERONE 10.5 MG/1
10.5 CAPSULE ORAL DAILY
Qty: 90 CAPSULE | Refills: 0 | Status: SHIPPED | OUTPATIENT
Start: 2024-07-10 | End: 2024-07-25 | Stop reason: SDUPTHER

## 2024-07-10 RX ORDER — LAMOTRIGINE 200 MG/1
TABLET ORAL
Qty: 135 TABLET | Refills: 0 | Status: SHIPPED | OUTPATIENT
Start: 2024-07-10 | End: 2024-07-25 | Stop reason: SDUPTHER

## 2024-07-10 RX ORDER — BENZTROPINE MESYLATE 1 MG/1
1 TABLET ORAL 2 TIMES DAILY
Qty: 180 TABLET | Refills: 0 | Status: SHIPPED | OUTPATIENT
Start: 2024-07-10 | End: 2024-07-25 | Stop reason: SDUPTHER

## 2024-07-10 RX ORDER — BUPROPION HYDROCHLORIDE 300 MG/1
300 TABLET ORAL
Qty: 90 TABLET | Refills: 0 | Status: SHIPPED | OUTPATIENT
Start: 2024-07-10 | End: 2024-07-25 | Stop reason: SDUPTHER

## 2024-07-10 NOTE — PROGRESS NOTES
"Outpatient Psychiatry      Subjective     December Mack, a 44 y.o. female,presents for follow up for outpatient psychiatry /medication management appointment for an audio visual virtual appointment as an established patient . She consented to the appointment       Diagnosis:   Schizoaffective disorder (295.70) (F25.9) bipolar type primary diagnoses moderate  Sleep disturbances (780.50) (G47.9) moderate  Trauma and stressor-related disorder moderate  ESTUARDO (generalized anxiety disorder) moderate  Attention deficit disorder mild       Patient Active Problem List   Diagnosis    Chest congestion    Gastroesophageal reflux disease without esophagitis    Vitamin D deficiency    Chronic left shoulder pain    Abnormal thyroid function test    ADD (attention deficit disorder)    Anxiety disorder    Callus of foot    Depression    Diabetes mellitus due to underlying condition with hyperosmolarity without coma (Multi)    Hair loss    Alopecia areata, unspecified    Asthma (HHS-HCC)    Chest pain    Fatigue    History of metabolic syndrome    Hoarseness of voice    Hx of thyroid cancer    Hypertension, essential    Mediastinal lymphadenopathy    Metatarsalgia of both feet    Onychomycosis    Postoperative hypothyroidism    Schizoaffective disorder (Multi)    Sleep difficulties    Substance-related disorder (Multi)    Thyroid nodule    Tobacco use disorder    Vision changes    Left shoulder pain    Dental caries    History of malignant neoplasm of thyroid    Pre-ulcerative calluses    Trauma and stressor-related disorder    Tremor     Treatment Plan/Recommendations: 4-6 weeks follow up , can continue self care and wellness efforts and maintain routine health screenings.can call  for treatment and scheduling concerns.     Follow-up plan was discussed with patient.  Psychotropic medications \"  Continue benztropine 1 mg, twice a day for restlessness/eps  Continue Bupropion xl 300 mg daily each morning for depression and " concentration and focus /add  Continue lamotrigine 200 mg 1/2 tablet each morning and 1 tablet each night for schitzoaffective disorder bipolar type /mood stability   Continue caplyta 10.5 mg , daily for schitzoaffective disorder bipolar type/mood stability       Review with patient: Treatment plan reviewed with the patient.  Medication risks/benefit reviewed with the patient    HPI:  reconciled and reviewed medication list in the [facility] emr and provided psychoeducation   support provided  reviewed and reconciled medication list in the [facility] emr   she spoke about stressors and intends to continue therapy   says mood can get anxious and depressed over stressors , support provided    Tolerating medications   She gets periods of times she gets suicidal thoughts , says she would not act on them , she tries to use distraction , she thinks of how this would affect her kids negatively   Intends to continue therapy regularly    She is fatigued , sleep varies depends on stressors     Psych ros and medical ros as noted in hpi section of this note above      Current Outpatient Medications:     amLODIPine (Norvasc) 10 mg tablet, Take 1 tablet (10 mg) by mouth once daily., Disp: 90 tablet, Rfl: 3    atorvastatin (Lipitor) 40 mg tablet, Take 1 tablet (40 mg) by mouth once daily. For your elevated cholesterol levels., Disp: 90 tablet, Rfl: 3    benztropine (Cogentin) 1 mg tablet, Take 1 tablet (1 mg) by mouth 2 times a day., Disp: 180 tablet, Rfl: 0    buPROPion XL (Wellbutrin XL) 300 mg 24 hr tablet, Take 1 tablet (300 mg) by mouth once daily in the morning. Take before meals., Disp: 90 tablet, Rfl: 0    cholecalciferol (Vitamin D-3) 50 MCG (2000 UT) tablet, TAKE 1 TABLET BY MOUTH EVERY DAY, Disp: 90 tablet, Rfl: 0    Daily-Jarocho, with folic acid, 400 mcg tablet, Take 1 tablet by mouth once daily., Disp: 90 each, Rfl: 3    diclofenac sodium (Voltaren) 1 % gel, Apply 4.5 inches (4 g) topically 4 times a day., Disp: 100 g, Rfl: 1     ibuprofen 800 mg tablet, Take by mouth., Disp: , Rfl:     lamoTRIgine (LaMICtal) 200 mg tablet, 1/2 tablet daily each morning and 1 tablet daily each night, Disp: 135 tablet, Rfl: 0    levothyroxine (Synthroid, Levoxyl) 150 mcg tablet, Take 1 tablet (150 mcg) by mouth once daily., Disp: 90 tablet, Rfl: 0    loratadine (Claritin) 10 mg tablet, Take by mouth., Disp: , Rfl:     lumateperone (Caplyta) 10.5 mg capsule, Take 10.5 mg by mouth once daily., Disp: 90 capsule, Rfl: 0    omeprazole (PriLOSEC) 40 mg DR capsule, Take 1 capsule (40 mg) by mouth once daily in the morning. Take before meals., Disp: 90 capsule, Rfl: 3  Medical History:  Past Medical History:   Diagnosis Date    Acute upper respiratory infection, unspecified 12/08/2016    URI, acute    Cannabis abuse, uncomplicated     Marijuana abuse, continuous    Encounter for general adult medical examination without abnormal findings 08/12/2018    Routine history and physical examination of adult    Encounter for screening for respiratory tuberculosis 12/08/2016    Tuberculosis screening    Other abnormality of red blood cells 07/15/2019    Microcytosis    Other psychoactive substance use, unspecified with unspecified psychoactive substance-induced disorder (Multi)     Substance-related disorder    Pain in right knee 01/11/2018    Bilateral knee pain    Personal history of malignant neoplasm of thyroid     History of thyroid cancer    Personal history of malignant neoplasm of thyroid 10/14/2021    History of malignant neoplasm of thyroid    Personal history of malignant neoplasm of thyroid 12/15/2016    History of thyroid cancer    Personal history of other diseases of the nervous system and sense organs 03/23/2016    History of seizure disorder    Personal history of other diseases of the respiratory system     History of allergic rhinitis    Personal history of other diseases of the respiratory system 08/06/2018    History of allergic rhinitis    Personal  history of other drug therapy 12/08/2016    History of influenza vaccination    Personal history of other endocrine, nutritional and metabolic disease 08/06/2018    History of hypercalcemia    Personal history of other endocrine, nutritional and metabolic disease 08/02/2018    History of vitamin D deficiency    Personal history of other infectious and parasitic diseases 08/20/2016    History of onychomycosis    Personal history of other mental and behavioral disorders     History of affective disorder    Personal history of other specified conditions 07/12/2021    History of dysuria    Personal history of other specified conditions 04/13/2018    History of fatigue    Vitamin D deficiency, unspecified     Vitamin D deficiency     Surgical History:  Past Surgical History:   Procedure Laterality Date    THYROID SURGERY  08/19/2016    Thyroid Surgery     Family History:  Family History   Problem Relation Name Age of Onset    Other (Malignant neoplasm of breast) Other Aunt     Cancer Other Aunt     Diabetes Other Uncle      Social History:  Social History     Socioeconomic History    Marital status: Single     Spouse name: Not on file    Number of children: Not on file    Years of education: Not on file    Highest education level: Not on file   Occupational History    Not on file   Tobacco Use    Smoking status: Every Day     Types: Cigarettes     Passive exposure: Past    Smokeless tobacco: Never   Substance and Sexual Activity    Alcohol use: Yes    Drug use: Never    Sexual activity: Not on file   Other Topics Concern    Not on file   Social History Narrative    Not on file     Social Determinants of Health     Financial Resource Strain: Not on file   Food Insecurity: Not on file   Transportation Needs: Not on file   Physical Activity: Not on file   Stress: Not on file   Social Connections: Not on file   Intimate Partner Violence: Not on file   Housing Stability: Not on file     Record Review: brief     Vitals:  There  were no vitals filed for this visit.      Reba Stauffer, APRN-CNS

## 2024-07-12 ENCOUNTER — TELEPHONE (OUTPATIENT)
Dept: PRIMARY CARE | Facility: CLINIC | Age: 44
End: 2024-07-12
Payer: COMMERCIAL

## 2024-07-12 NOTE — TELEPHONE ENCOUNTER
Patient called in wanting to know why she has an appointment on 08/02 karri she was just seen on 07/30. Patient stated following her appointment on 07/30 she was referred to rheumatology and she has already completed that appointment. Please advise if patient still needs to be seen on 08/02.

## 2024-07-17 ENCOUNTER — APPOINTMENT (OUTPATIENT)
Dept: BEHAVIORAL HEALTH | Facility: CLINIC | Age: 44
End: 2024-07-17
Payer: COMMERCIAL

## 2024-07-17 DIAGNOSIS — F25.0 SCHIZOAFFECTIVE DISORDER, BIPOLAR TYPE (MULTI): ICD-10-CM

## 2024-07-17 PROCEDURE — 3061F NEG MICROALBUMINURIA REV: CPT | Performed by: PSYCHOLOGIST

## 2024-07-17 PROCEDURE — 90837 PSYTX W PT 60 MINUTES: CPT | Performed by: PSYCHOLOGIST

## 2024-07-17 NOTE — PROGRESS NOTES
"11 AM 64373 Ind Psych for Schizoaffective Dx, Bipolar Type (60 MIN, 4766-1783)  Virtual. Supportive Therapy. Couldn't sleep last night, said she was having manic symptoms. Feeling overloaded. Some distress with bf who told her she was \"weak.\" Took sons over there and stayed a few days but arguments ensued, started with arguing over wi fi. Still trying to get services for [place]. Took him to store, he saw neighbor who patient has had conflict with and wanted to leave. She's discussing meds with pharmacologist. Some issues getting dental appt for son, spoke to worker about appt that was in dispute, asked to talk to a supervisor. Next: 1 week.    "

## 2024-07-18 ENCOUNTER — TELEPHONE (OUTPATIENT)
Dept: OTHER | Age: 44
End: 2024-07-18
Payer: COMMERCIAL

## 2024-07-22 ENCOUNTER — TELEPHONE (OUTPATIENT)
Dept: OTHER | Age: 44
End: 2024-07-22
Payer: COMMERCIAL

## 2024-07-23 ENCOUNTER — DOCUMENTATION (OUTPATIENT)
Dept: BEHAVIORAL HEALTH | Facility: CLINIC | Age: 44
End: 2024-07-23
Payer: COMMERCIAL

## 2024-07-23 NOTE — PROGRESS NOTES
Nonbillable note : nursing staff called patient yesterday to ask about what medication concerns patient had after patient called and asked for a call pertaining to mediations and patient requested writer call her instead  , writer called patient today and left a message for her on her voicemail ( not able to reach patient ) to send a my chart message or call writer back with the specific medication concern. This patient has an appointment scheduled for 7/25/24.[facility]

## 2024-07-24 ENCOUNTER — APPOINTMENT (OUTPATIENT)
Dept: BEHAVIORAL HEALTH | Facility: CLINIC | Age: 44
End: 2024-07-24
Payer: COMMERCIAL

## 2024-07-24 DIAGNOSIS — F25.0 SCHIZOAFFECTIVE DISORDER, BIPOLAR TYPE (MULTI): ICD-10-CM

## 2024-07-24 PROCEDURE — 3061F NEG MICROALBUMINURIA REV: CPT | Performed by: PSYCHOLOGIST

## 2024-07-24 PROCEDURE — 90834 PSYTX W PT 45 MINUTES: CPT | Performed by: PSYCHOLOGIST

## 2024-07-24 NOTE — PROGRESS NOTES
"11 AM 76106 Indiv Psych for Schizoaffective Dx, Bipolar Type (45 MIN, 0386-8551)  Virtual. Supportive Therapy. Able to get clarification with bf who said she was \"weak\", he clarified that he doesn't think she's consistent with way she disciplines, she agrees. Spent most of time discussing managing sons' behaviors. Oldest, Murphy, will start with [facility], she doesn't know what to do with. He continues to have \"meltdowns\" and destroys property. Not feeling well, meets with pharmacologist tomorrow. Next: 1 week.   "

## 2024-07-25 ENCOUNTER — TELEMEDICINE (OUTPATIENT)
Dept: BEHAVIORAL HEALTH | Facility: CLINIC | Age: 44
End: 2024-07-25
Payer: COMMERCIAL

## 2024-07-25 DIAGNOSIS — F33.1 DEPRESSION, MAJOR, RECURRENT, MODERATE (MULTI): ICD-10-CM

## 2024-07-25 DIAGNOSIS — F25.0 SCHIZOAFFECTIVE DISORDER, BIPOLAR TYPE (MULTI): ICD-10-CM

## 2024-07-25 DIAGNOSIS — G25.9 EXTRAPYRAMIDAL REACTION: ICD-10-CM

## 2024-07-25 PROCEDURE — 3061F NEG MICROALBUMINURIA REV: CPT | Performed by: CLINICAL NURSE SPECIALIST

## 2024-07-25 PROCEDURE — 99213 OFFICE O/P EST LOW 20 MIN: CPT | Performed by: CLINICAL NURSE SPECIALIST

## 2024-07-25 RX ORDER — LAMOTRIGINE 200 MG/1
TABLET ORAL
Qty: 90 TABLET | Refills: 0 | Status: SHIPPED | OUTPATIENT
Start: 2024-07-25

## 2024-07-25 RX ORDER — LUMATEPERONE 10.5 MG/1
10.5 CAPSULE ORAL DAILY
Qty: 90 CAPSULE | Refills: 0 | Status: SHIPPED | OUTPATIENT
Start: 2024-07-25 | End: 2024-10-23

## 2024-07-25 RX ORDER — BENZTROPINE MESYLATE 1 MG/1
1 TABLET ORAL 2 TIMES DAILY
Qty: 180 TABLET | Refills: 0 | Status: SHIPPED | OUTPATIENT
Start: 2024-07-25 | End: 2024-10-23

## 2024-07-25 RX ORDER — BUPROPION HYDROCHLORIDE 300 MG/1
300 TABLET ORAL
Qty: 90 TABLET | Refills: 0 | Status: SHIPPED | OUTPATIENT
Start: 2024-07-25 | End: 2024-10-23

## 2024-07-25 RX ORDER — LAMOTRIGINE 150 MG/1
150 TABLET ORAL DAILY
Qty: 90 TABLET | Refills: 0 | Status: SHIPPED | OUTPATIENT
Start: 2024-07-25 | End: 2024-10-23

## 2024-07-25 NOTE — PROGRESS NOTES
"Outpatient Psychiatry      Subjective     December Mack, a 44 y.o. female, presents for follow up for outpatient psychiatry /medication management appointment for an audio visual virtual appointment as an established patient . She verbally consented to the appointment       Diagnosis:   Schizoaffective disorder (295.70) (F25.9) bipolar type primary diagnoses moderate  Sleep disturbances (780.50) (G47.9) moderate  Trauma and stressor-related disorder moderate  ESTUARDO (generalized anxiety disorder) moderate  Attention deficit disorder mild         Problem List       Patient Active Problem List   Diagnosis    Chest congestion    Gastroesophageal reflux disease without esophagitis    Vitamin D deficiency    Chronic left shoulder pain    Abnormal thyroid function test    ADD (attention deficit disorder)    Anxiety disorder    Callus of foot    Depression    Diabetes mellitus due to underlying condition with hyperosmolarity without coma (Multi)    Hair loss    Alopecia areata, unspecified    Asthma ([facility])    Chest pain    Fatigue    History of metabolic syndrome    Hoarseness of voice    Hx of thyroid cancer    Hypertension, essential    Mediastinal lymphadenopathy    Metatarsalgia of both feet    Onychomycosis    Postoperative hypothyroidism    Schizoaffective disorder (Multi)    Sleep difficulties    Substance-related disorder (Multi)    Thyroid nodule    Tobacco use disorder    Vision changes    Left shoulder pain    Dental caries    History of malignant neoplasm of thyroid    Pre-ulcerative calluses    Trauma and stressor-related disorder    Tremor         Treatment Plan/Recommendations: 4-6 weeks follow up , can continue self care and wellness efforts and maintain routine health screenings.can call  for treatment and scheduling concerns.     Follow-up plan was discussed with patient.  Psychotropic medications \"  Continue benztropine 1 mg, twice a day for restlessness/eps  Continue Bupropion xl 300 mg " daily each morning for depression and concentration and focus /add  Continue lamotrigine 150 mg , each morning and 200 mg  tablet each night for schitzoaffective disorder bipolar type /mood stability ( back to dose previously since she is feeling more irritable and more depressed since it was lowered )   Continue caplyta 10.5 mg , daily for schitzoaffective disorder bipolar type/mood stability         Review with patient: Treatment plan reviewed with the patient.  Medication risks/benefit reviewed with the patient     HPI:  reconciled and reviewed medication list in the [facility] emr and provided psychoeducation   support provided  reviewed and reconciled medication list in the [facility] emr   she spoke about stressors and intends to continue therapy   says mood can get anxious and depressed over stressors , support provided    Tolerating medications   She gets periods of times she gets suicidal thoughts , says she would not act on them , she tries to use distraction , she thinks of how this would affect her kids negatively   Intends to continue therapy regularly    She is fatigued , sleep varies depends on stressors   Reviewed meds options and she agrees to the plan      Psych ros and medical ros as noted in hpi section of this note above             Patient Active Problem List   Diagnosis    Chest congestion    Gastroesophageal reflux disease without esophagitis    Vitamin D deficiency    Chronic left shoulder pain    Abnormal thyroid function test    ADD (attention deficit disorder)    Anxiety disorder    Callus of foot    Depression    Diabetes mellitus due to underlying condition with hyperosmolarity without coma (Multi)    Hair loss    Alopecia areata, unspecified    Asthma ([facility])    Chest pain    Fatigue    History of metabolic syndrome    Hoarseness of voice    Hx of thyroid cancer    Hypertension, essential    Mediastinal lymphadenopathy    Metatarsalgia of both feet    Onychomycosis    Postoperative  hypothyroidism    Schizoaffective disorder (Multi)    Sleep difficulties    Substance-related disorder (Multi)    Thyroid nodule    Tobacco use disorder    Vision changes    Left shoulder pain    Dental caries    History of malignant neoplasm of thyroid    Pre-ulcerative calluses    Trauma and stressor-related disorder    Tremor         Current Outpatient Medications:     amLODIPine (Norvasc) 10 mg tablet, Take 1 tablet (10 mg) by mouth once daily., Disp: 90 tablet, Rfl: 3    atorvastatin (Lipitor) 40 mg tablet, Take 1 tablet (40 mg) by mouth once daily. For your elevated cholesterol levels., Disp: 90 tablet, Rfl: 3    benztropine (Cogentin) 1 mg tablet, Take 1 tablet (1 mg) by mouth 2 times a day., Disp: 180 tablet, Rfl: 0    buPROPion XL (Wellbutrin XL) 300 mg 24 hr tablet, Take 1 tablet (300 mg) by mouth once daily in the morning. Take before meals., Disp: 90 tablet, Rfl: 0    cholecalciferol (Vitamin D-3) 50 MCG (2000 UT) tablet, TAKE 1 TABLET BY MOUTH EVERY DAY, Disp: 90 tablet, Rfl: 0    Daily-Jarocho, with folic acid, 400 mcg tablet, Take 1 tablet by mouth once daily., Disp: 90 each, Rfl: 3    diclofenac sodium (Voltaren) 1 % gel, Apply 4.5 inches (4 g) topically 4 times a day., Disp: 100 g, Rfl: 1    ibuprofen 800 mg tablet, Take by mouth., Disp: , Rfl:     lamoTRIgine (LaMICtal) 150 mg tablet, Take 1 tablet (150 mg) by mouth once daily. Each morning. Also is prescribed 200 mg daily each night at bedtime, Disp: 90 tablet, Rfl: 0    lamoTRIgine (LaMICtal) 200 mg tablet, 1 tablet daily each night at bedtime, also is prescribed 150 mg , each morning, Disp: 90 tablet, Rfl: 0    levothyroxine (Synthroid, Levoxyl) 150 mcg tablet, Take 1 tablet (150 mcg) by mouth once daily., Disp: 90 tablet, Rfl: 0    loratadine (Claritin) 10 mg tablet, Take by mouth., Disp: , Rfl:     lumateperone (Caplyta) 10.5 mg capsule, Take 10.5 mg by mouth once daily., Disp: 90 capsule, Rfl: 0    omeprazole (PriLOSEC) 40 mg DR capsule, Take 1  capsule (40 mg) by mouth once daily in the morning. Take before meals., Disp: 90 capsule, Rfl: 3  Medical History:  Past Medical History:   Diagnosis Date    Acute upper respiratory infection, unspecified 12/08/2016    URI, acute    Cannabis abuse, uncomplicated     Marijuana abuse, continuous    Encounter for general adult medical examination without abnormal findings 08/12/2018    Routine history and physical examination of adult    Encounter for screening for respiratory tuberculosis 12/08/2016    Tuberculosis screening    Other abnormality of red blood cells 07/15/2019    Microcytosis    Other psychoactive substance use, unspecified with unspecified psychoactive substance-induced disorder (Multi)     Substance-related disorder    Pain in right knee 01/11/2018    Bilateral knee pain    Personal history of malignant neoplasm of thyroid     History of thyroid cancer    Personal history of malignant neoplasm of thyroid 10/14/2021    History of malignant neoplasm of thyroid    Personal history of malignant neoplasm of thyroid 12/15/2016    History of thyroid cancer    Personal history of other diseases of the nervous system and sense organs 03/23/2016    History of seizure disorder    Personal history of other diseases of the respiratory system     History of allergic rhinitis    Personal history of other diseases of the respiratory system 08/06/2018    History of allergic rhinitis    Personal history of other drug therapy 12/08/2016    History of influenza vaccination    Personal history of other endocrine, nutritional and metabolic disease 08/06/2018    History of hypercalcemia    Personal history of other endocrine, nutritional and metabolic disease 08/02/2018    History of vitamin D deficiency    Personal history of other infectious and parasitic diseases 08/20/2016    History of onychomycosis    Personal history of other mental and behavioral disorders     History of affective disorder    Personal history of  other specified conditions 07/12/2021    History of dysuria    Personal history of other specified conditions 04/13/2018    History of fatigue    Vitamin D deficiency, unspecified     Vitamin D deficiency     Surgical History:  Past Surgical History:   Procedure Laterality Date    THYROID SURGERY  08/19/2016    Thyroid Surgery     Family History:  Family History   Problem Relation Name Age of Onset    Other (Malignant neoplasm of breast) Other Aunt     Cancer Other Aunt     Diabetes Other Uncle      Social History:  Social History     Socioeconomic History    Marital status: Single     Spouse name: Not on file    Number of children: Not on file    Years of education: Not on file    Highest education level: Not on file   Occupational History    Not on file   Tobacco Use    Smoking status: Every Day     Types: Cigarettes     Passive exposure: Past    Smokeless tobacco: Never   Substance and Sexual Activity    Alcohol use: Yes    Drug use: Never    Sexual activity: Not on file   Other Topics Concern    Not on file   Social History Narrative    Not on file     Social Determinants of Health     Financial Resource Strain: Not on file   Food Insecurity: Not on file   Transportation Needs: Not on file   Physical Activity: Not on file   Stress: Not on file   Social Connections: Not on file   Intimate Partner Violence: Not on file   Housing Stability: Not on file     Record Review: brief       Vitals:  There were no vitals filed for this visit.    Reba Stauffer, APRN-CNS

## 2024-07-26 ENCOUNTER — TELEPHONE (OUTPATIENT)
Dept: BEHAVIORAL HEALTH | Facility: CLINIC | Age: 44
End: 2024-07-26
Payer: COMMERCIAL

## 2024-07-26 NOTE — PROGRESS NOTES
Addended by: EDIS DUFFY on: 12/12/2022 04:10 PM     Modules accepted: Orders     Prior Auth PENDING  benztropine (Cogentin) 1 mg tablet   This medication or product is on your plan's list of covered drugs. Prior authorization is not required at this time. If your pharmacy has questions regarding the processing of your prescription, please have them call the [facility] pharmacy help desk at [phone].

## 2024-07-30 ENCOUNTER — APPOINTMENT (OUTPATIENT)
Dept: PRIMARY CARE | Facility: CLINIC | Age: 44
End: 2024-07-30
Payer: COMMERCIAL

## 2024-07-31 ENCOUNTER — APPOINTMENT (OUTPATIENT)
Dept: INTEGRATIVE MEDICINE | Facility: CLINIC | Age: 44
End: 2024-07-31
Payer: COMMERCIAL

## 2024-08-01 ENCOUNTER — TELEPHONE (OUTPATIENT)
Dept: OTHER | Age: 44
End: 2024-08-01
Payer: COMMERCIAL

## 2024-08-02 ENCOUNTER — APPOINTMENT (OUTPATIENT)
Dept: PRIMARY CARE | Facility: CLINIC | Age: 44
End: 2024-08-02
Payer: COMMERCIAL

## 2024-08-02 VITALS
HEIGHT: 68 IN | TEMPERATURE: 97.2 F | BODY MASS INDEX: 26.22 KG/M2 | SYSTOLIC BLOOD PRESSURE: 117 MMHG | DIASTOLIC BLOOD PRESSURE: 82 MMHG | HEART RATE: 79 BPM | WEIGHT: 173 LBS | OXYGEN SATURATION: 98 %

## 2024-08-02 DIAGNOSIS — R30.0 DYSURIA: ICD-10-CM

## 2024-08-02 DIAGNOSIS — N89.8 VAGINAL ODOR: ICD-10-CM

## 2024-08-02 DIAGNOSIS — Z11.3 ROUTINE SCREENING FOR STI (SEXUALLY TRANSMITTED INFECTION): Primary | ICD-10-CM

## 2024-08-02 LAB
CLUE CELLS SPEC QL WET PREP: NORMAL
POC APPEARANCE, URINE: CLEAR
POC BILIRUBIN, URINE: NEGATIVE
POC BLOOD, URINE: ABNORMAL
POC COLOR, URINE: YELLOW
POC GLUCOSE, URINE: NEGATIVE MG/DL
POC KETONES, URINE: NEGATIVE MG/DL
POC LEUKOCYTES, URINE: NEGATIVE
POC NITRITE,URINE: NEGATIVE
POC PH, URINE: 6 PH
POC PROTEIN, URINE: NEGATIVE MG/DL
POC SPECIFIC GRAVITY, URINE: >=1.03
POC UROBILINOGEN, URINE: 0.2 EU/DL
T VAGINALIS SPEC QL WET PREP: NORMAL
WBC VAG QL WET PREP: NORMAL
YEAST VAG QL WET PREP: NORMAL

## 2024-08-02 PROCEDURE — 87491 CHLMYD TRACH DNA AMP PROBE: CPT

## 2024-08-02 PROCEDURE — 3008F BODY MASS INDEX DOCD: CPT

## 2024-08-02 PROCEDURE — 87210 SMEAR WET MOUNT SALINE/INK: CPT

## 2024-08-02 PROCEDURE — 3061F NEG MICROALBUMINURIA REV: CPT

## 2024-08-02 PROCEDURE — 87661 TRICHOMONAS VAGINALIS AMPLIF: CPT

## 2024-08-02 PROCEDURE — 3079F DIAST BP 80-89 MM HG: CPT

## 2024-08-02 PROCEDURE — 99213 OFFICE O/P EST LOW 20 MIN: CPT

## 2024-08-02 PROCEDURE — 3074F SYST BP LT 130 MM HG: CPT

## 2024-08-02 PROCEDURE — 81003 URINALYSIS AUTO W/O SCOPE: CPT

## 2024-08-02 PROCEDURE — 87591 N.GONORRHOEAE DNA AMP PROB: CPT

## 2024-08-02 ASSESSMENT — PATIENT HEALTH QUESTIONNAIRE - PHQ9
1. LITTLE INTEREST OR PLEASURE IN DOING THINGS: NOT AT ALL
SUM OF ALL RESPONSES TO PHQ9 QUESTIONS 1 AND 2: 1
2. FEELING DOWN, DEPRESSED OR HOPELESS: SEVERAL DAYS

## 2024-08-02 ASSESSMENT — ENCOUNTER SYMPTOMS
DEPRESSION: 1
FREQUENCY: 1
DYSURIA: 1

## 2024-08-02 ASSESSMENT — PAIN SCALES - GENERAL: PAINLEVEL: 0-NO PAIN

## 2024-08-02 NOTE — PROGRESS NOTES
"Subjective   Patient ID:   December Danny is a 44 y.o. female who presents for Follow-up (' There is a smell coming out from me and I don't know what that is , I sometimes loose my balance out of the blue and things drops off my hand' per pt).  HPI  #Vaginal odor  - Last sexually active 3 months ago and uses protection all the time  - Reported she had a toe infection and was taking antibiotics earlier this year and was given pill for yeast infection but never took it  - Reported the odor has been going on for a few months  - Describes the odor as fishy    - Reports having a light whitish discharge, not related to her periods  - Denied any pain or itching  - Reports a little bit of burning with urination and some increased urinary frequency    #STI screening  - Reported her partner had syphilis and was treated  - She was tested and told she was positive but got restested at a different office and told she was negative  - Reports she may have been tested about 4 years ago  - Denied ever being treated  - Would like to be screened for STIs      Review of Systems   Genitourinary:  Positive for dysuria, frequency and vaginal discharge.       Objective   /82 (BP Location: Right arm, Patient Position: Sitting)   Pulse 79   Temp 36.2 °C (97.2 °F) (Temporal)   Ht 1.715 m (5' 7.5\")   Wt 78.5 kg (173 lb)   SpO2 98%   BMI 26.70 kg/m²    Physical Exam  Constitutional:       General: She is not in acute distress.     Appearance: Normal appearance. She is not ill-appearing.   HENT:      Head: Normocephalic and atraumatic.   Eyes:      Extraocular Movements: Extraocular movements intact.   Cardiovascular:      Rate and Rhythm: Normal rate and regular rhythm.      Heart sounds: Normal heart sounds. No murmur heard.     No friction rub. No gallop.   Pulmonary:      Effort: Pulmonary effort is normal. No respiratory distress.      Breath sounds: Normal breath sounds.   Abdominal:      Palpations: Abdomen is soft. "   Musculoskeletal:         General: Normal range of motion.      Cervical back: Normal range of motion.   Neurological:      General: No focal deficit present.      Mental Status: She is alert and oriented to person, place, and time.   Psychiatric:         Mood and Affect: Mood normal.         Behavior: Behavior normal.         Assessment/Plan     Problem List Items Addressed This Visit    Marlena Delagdo is a 44 year old female who presents to the clinic for vaginal odor and concerns for UTI.    #Vaginal odor  - symptoms likely consistent with BV  - Ordered Wet prep for confirmation, patient self swabbed    #c/f UTI  - POCT UA was negative for nitrites & LE    #STI screening  - Last syphilis screen was non-reactive in 2020  - Ordered HIV, Syphilis, Chlamydia, Gonorrhea, & Trichomonas    RTC as needed         The patient was discussed with Dr. Penny.    Harlan Sher MD  Family Medicine   PGY-3

## 2024-08-02 NOTE — PROGRESS NOTES
I reviewed the resident/fellow's documentation and discussed the patient with the resident/fellow. I agree with the resident/fellow's medical decision making as documented in the note.    Juan M Penny MD

## 2024-08-03 LAB
C TRACH RRNA SPEC QL NAA+PROBE: NEGATIVE
N GONORRHOEA DNA SPEC QL PROBE+SIG AMP: NEGATIVE
T VAGINALIS RRNA SPEC QL NAA+PROBE: NEGATIVE

## 2024-08-05 ENCOUNTER — APPOINTMENT (OUTPATIENT)
Dept: BEHAVIORAL HEALTH | Facility: CLINIC | Age: 44
End: 2024-08-05
Payer: COMMERCIAL

## 2024-08-05 DIAGNOSIS — F25.0 SCHIZOAFFECTIVE DISORDER, BIPOLAR TYPE (MULTI): ICD-10-CM

## 2024-08-05 PROCEDURE — 90837 PSYTX W PT 60 MINUTES: CPT | Performed by: PSYCHOLOGIST

## 2024-08-05 PROCEDURE — 3061F NEG MICROALBUMINURIA REV: CPT | Performed by: PSYCHOLOGIST

## 2024-08-05 NOTE — PROGRESS NOTES
"1 PM [id number] [facility] for Schizoaffective Dx, Bipolar Type (60 MIN, 3678-277)  Virtual. Supportive Therapy.  Patient feeling overwhelmed.  More depressed and anxious.  Reported some suicidal ideation.  Spent most of the time discussing stressors.  Primary stressor includes difficulty managing her 2 sons.  \"I do not see myself progressing if I have Tavon full-time.\"She is considering options including a group home, and getting more intensive therapy services for him.  Past in-home therapy was not so successful.  She is concerned because he is only 12 and says she will likely have him beyond 18 because of his condition.  Reported he is still tearing up the house.  Also having difficulties managing her oldest son Murphy.  Encouraged her to discuss her son's IEP plan at school and also to look into aftercare services.  Her oldest son and Tavon do not get along. Next: 1 week.   "

## 2024-08-07 ENCOUNTER — APPOINTMENT (OUTPATIENT)
Dept: BEHAVIORAL HEALTH | Facility: CLINIC | Age: 44
End: 2024-08-07
Payer: COMMERCIAL

## 2024-08-07 DIAGNOSIS — F25.0 SCHIZOAFFECTIVE DISORDER, BIPOLAR TYPE (MULTI): ICD-10-CM

## 2024-08-07 PROCEDURE — 3061F NEG MICROALBUMINURIA REV: CPT | Performed by: PSYCHOLOGIST

## 2024-08-07 PROCEDURE — 90832 PSYTX W PT 30 MINUTES: CPT | Performed by: PSYCHOLOGIST

## 2024-08-07 NOTE — PROGRESS NOTES
11 AM [address] [facility] Psych for Schizoaffective Dx, Bipolar Type (30 MIN, 4706-4755)  Virtual. Supportive Therapy.  Patient had scheduled a meeting on Monday and was unaware she had this appointment scheduled today.  As a result, we spent less time.  She had a meeting scheduled to see Tavon's .  Reported she is feeling a bit better today.  Attributed this to getting more sleep.  Hopes to take her medications consistently and we discussed some ways to do this.  Wants to get Murphy to take his meds consistently as well.  Discussed some ways boyfriend can be more supportive.  She also expressed an interest in meeting consistently through the fall to help her manage her situation. Next: 1 week.

## 2024-08-14 ENCOUNTER — APPOINTMENT (OUTPATIENT)
Dept: BEHAVIORAL HEALTH | Facility: CLINIC | Age: 44
End: 2024-08-14
Payer: COMMERCIAL

## 2024-08-14 ENCOUNTER — TELEPHONE (OUTPATIENT)
Dept: OTHER | Age: 44
End: 2024-08-14

## 2024-08-14 NOTE — TELEPHONE ENCOUNTER
Still feels like anxiety is increasing, especially with her kids going back to school.  She would like to speak to you if possible, [phone]

## 2024-08-21 ENCOUNTER — APPOINTMENT (OUTPATIENT)
Dept: BEHAVIORAL HEALTH | Facility: CLINIC | Age: 44
End: 2024-08-21
Payer: COMMERCIAL

## 2024-08-21 DIAGNOSIS — F25.0: ICD-10-CM

## 2024-08-21 PROCEDURE — 90834 PSYTX W PT 45 MINUTES: CPT | Performed by: PSYCHOLOGIST

## 2024-08-21 PROCEDURE — 3061F NEG MICROALBUMINURIA REV: CPT | Performed by: PSYCHOLOGIST

## 2024-08-21 NOTE — PROGRESS NOTES
"111 AM [address] [facility] for Schizoaffective Dx, Bipolar Type (45 MIN, 7255-5221)  Virtual. Supportive Therapy.  Patient reported that she has been attempting to take her meds more consistently.  Sees pharmacologist tomorrow.  At times will not take medication because she needs to be up if she hears her son.  Has not been getting enough sleep.  Spent most of the time discussing her plans for the school routine with the kids keeping them in line.  Is holding the rules for her son Murphy to take his psychiatric medications routinely also for son Tavon has expectation of showering daily as well as getting up and get around for the morning routine.  Both sons have a .  Plans to take oldest son and possibly daughter for boxing classes.  Went to school and did a meet and greet with the teachers for son Tavon.  Has an IEP review in November which she will attend.  Reported that she has been feeling depressed but is trying to be optimistic that the school year will go better which will make things easier for both her and her sons.  Oldest son wants to play basketball.  Trying to be helpful that she could \"get my life back\". Next: 1 week.  "

## 2024-08-22 ENCOUNTER — APPOINTMENT (OUTPATIENT)
Dept: BEHAVIORAL HEALTH | Facility: CLINIC | Age: 44
End: 2024-08-22
Payer: COMMERCIAL

## 2024-08-22 DIAGNOSIS — F25.0 SCHIZOAFFECTIVE DISORDER, BIPOLAR TYPE (MULTI): ICD-10-CM

## 2024-08-22 DIAGNOSIS — G47.9 SLEEP DIFFICULTIES: ICD-10-CM

## 2024-08-22 DIAGNOSIS — F98.8 ATTENTION DEFICIT DISORDER (ADD) WITHOUT HYPERACTIVITY: ICD-10-CM

## 2024-08-22 DIAGNOSIS — F41.1 GAD (GENERALIZED ANXIETY DISORDER): ICD-10-CM

## 2024-08-22 PROCEDURE — 99215 OFFICE O/P EST HI 40 MIN: CPT | Performed by: CLINICAL NURSE SPECIALIST

## 2024-08-22 PROCEDURE — 3061F NEG MICROALBUMINURIA REV: CPT | Performed by: CLINICAL NURSE SPECIALIST

## 2024-08-22 NOTE — PROGRESS NOTES
Outpatient Psychiatry      Subjective   December Mack, a 44 y.o. female, presents for follow up for outpatient psychiatry /medication management appointment for an audio visual appointment in real time as an established patient , she was at home for the appointment . She verbally consented to the appointment       Diagnosis:   Schizoaffective disorder (295.70) (F25.9) bipolar type primary diagnoses moderate  Sleep difficulties moderate  Trauma and stressor-related disorder moderate  ESTUARDO (generalized anxiety disorder) moderate  Attention deficit disorder mild         Problem List         Patient Active Problem List   Diagnosis    Chest congestion    Gastroesophageal reflux disease without esophagitis    Vitamin D deficiency    Chronic left shoulder pain    Abnormal thyroid function test    ADD (attention deficit disorder)    Anxiety disorder    Callus of foot    Depression    Diabetes mellitus due to underlying condition with hyperosmolarity without coma (Multi)    Hair loss    Alopecia areata, unspecified    Asthma ([facility])    Chest pain    Fatigue    History of metabolic syndrome    Hoarseness of voice    Hx of thyroid cancer    Hypertension, essential    Mediastinal lymphadenopathy    Metatarsalgia of both feet    Onychomycosis    Postoperative hypothyroidism    Schizoaffective disorder (Multi)    Sleep difficulties    Substance-related disorder (Multi)    Thyroid nodule    Tobacco use disorder    Vision changes    Left shoulder pain    Dental caries    History of malignant neoplasm of thyroid    Pre-ulcerative calluses    Trauma and stressor-related disorder    Tremor         Treatment Plan/Recommendations: 4-6 weeks follow up , can continue self care and wellness efforts and maintain routine health screenings.can call  for treatment and scheduling concerns.     Follow-up plan was discussed with patient.  Psychotropic medications :  Continue benztropine 1 mg, twice a day for  restlessness/eps  Continue Bupropion xl 300 mg daily each morning for depression and concentration and focus /add  Continue lamotrigine 150 mg , each morning and 200 mg  tablet each night for schitzoaffective disorder bipolar type /mood stability ( back to dose previously since she is feeling more irritable and more depressed since it was lowered )   Continue caplyta 10.5 mg , daily for schitzoaffective disorder bipolar type/mood stability      Review with patient: Treatment plan reviewed with the patient.  Medication risks/benefit reviewed with the patient     HPI:  reconciled and reviewed medication list in the [facility] emr and provided psychoeducation   support provided  reviewed and reconciled medication list in the [facility] emr   she spoke about stressors and intends to continue therapy   says mood can get anxious and depressed over stressors , support provided    Tolerating medications    She is fatigued , sleep varies depends on stressors , she skips her caplyta , she says it does not make her tired , she will work on compliance   Reviewed meds options and she agrees to the plan   She spoke in detail about her kids needs and how she tries to make sure they are doing well in school      Psych ros and medical ros as noted above      Current Outpatient Medications:     amLODIPine (Norvasc) 10 mg tablet, Take 1 tablet (10 mg) by mouth once daily., Disp: 90 tablet, Rfl: 3    atorvastatin (Lipitor) 40 mg tablet, Take 1 tablet (40 mg) by mouth once daily. For your elevated cholesterol levels., Disp: 90 tablet, Rfl: 3    benztropine (Cogentin) 1 mg tablet, Take 1 tablet (1 mg) by mouth 2 times a day., Disp: 180 tablet, Rfl: 0    buPROPion XL (Wellbutrin XL) 300 mg 24 hr tablet, Take 1 tablet (300 mg) by mouth once daily in the morning. Take before meals., Disp: 90 tablet, Rfl: 0    cholecalciferol (Vitamin D-3) 50 MCG (2000 UT) tablet, TAKE 1 TABLET BY MOUTH EVERY DAY, Disp: 90 tablet, Rfl: 0    diclofenac sodium  (Voltaren) 1 % gel, Apply 4.5 inches (4 g) topically 4 times a day., Disp: 100 g, Rfl: 1    ibuprofen 800 mg tablet, Take by mouth., Disp: , Rfl:     lamoTRIgine (LaMICtal) 150 mg tablet, Take 1 tablet (150 mg) by mouth once daily. Each morning. Also is prescribed 200 mg daily each night at bedtime, Disp: 90 tablet, Rfl: 0    lamoTRIgine (LaMICtal) 200 mg tablet, 1 tablet daily each night at bedtime, also is prescribed 150 mg , each morning, Disp: 90 tablet, Rfl: 0    levothyroxine (Synthroid, Levoxyl) 150 mcg tablet, Take 1 tablet (150 mcg) by mouth once daily., Disp: 90 tablet, Rfl: 0    loratadine (Claritin) 10 mg tablet, Take by mouth., Disp: , Rfl:     lumateperone (Caplyta) 10.5 mg capsule, Take 10.5 mg by mouth once daily., Disp: 90 capsule, Rfl: 0    omeprazole (PriLOSEC) 40 mg DR capsule, Take 1 capsule (40 mg) by mouth once daily in the morning. Take before meals., Disp: 90 capsule, Rfl: 3    Medical History:    Surgical History:  Past Surgical History:   Procedure Laterality Date    THYROID SURGERY  08/19/2016    Thyroid Surgery     Family History:  Family History   Problem Relation Name Age of Onset    Other (Malignant neoplasm of breast) Other Aunt     Cancer Other Aunt     Diabetes Other Uncle      Social History:  Social History     Socioeconomic History    Marital status: Single     Spouse name: Not on file    Number of children: Not on file    Years of education: Not on file    Highest education level: Not on file   Occupational History    Not on file   Tobacco Use    Smoking status: Every Day     Types: Cigarettes     Passive exposure: Past    Smokeless tobacco: Former   Substance and Sexual Activity    Alcohol use: Yes    Drug use: Never    Sexual activity: Not on file   Other Topics Concern    Not on file   Social History Narrative    Not on file     Social Determinants of Health     Financial Resource Strain: Not on file   Food Insecurity: Not on file   Transportation Needs: High Risk  (5/15/2024)    Received from [facility] SDOH Screening     Has lack of transportation kept you from medical appointments, meetings, work or from getting things needed for daily living? choose all that apply.: Yes, it has kept me from non-medical meetings, appointments, work or from getting things that i need     Has lack of transportation kept you from medical appointments, meetings, work or from getting things needed for daily living? choose all that apply.: Yes, it has kept me from medical appointments or from getting my medications   Physical Activity: Not on file   Stress: Not on file   Social Connections: Not on file   Intimate Partner Violence: Not on file   Housing Stability: Not on file     Record Review: brief     Vitals:  There were no vitals filed for this visit.    Reba Stauffer, APRN-CNS

## 2024-08-28 ENCOUNTER — APPOINTMENT (OUTPATIENT)
Dept: BEHAVIORAL HEALTH | Facility: CLINIC | Age: 44
End: 2024-08-28
Payer: COMMERCIAL

## 2024-08-28 DIAGNOSIS — F25.0 SCHIZOAFFECTIVE DISORDER, BIPOLAR TYPE (MULTI): ICD-10-CM

## 2024-08-28 PROCEDURE — 3061F NEG MICROALBUMINURIA REV: CPT | Performed by: PSYCHOLOGIST

## 2024-08-28 PROCEDURE — 90834 PSYTX W PT 45 MINUTES: CPT | Performed by: PSYCHOLOGIST

## 2024-08-29 NOTE — PROGRESS NOTES
11 AM [id number] [facility] Psych for Schizoaffective Dx, Bipolar Type (45 MIN, 4743-7831)  Virtual. Supportive Therapy. Spent most of the time discussing how patient is managing her kids.  Reported that Clifford is getting defiant and she has had difficulty getting him to comply with commands.  Most recently involved him wanting to go to his grandparents and patient said she would accompany him.  He was not pleased with this.  He has been getting mouthy with her.  She continues to try to get services through [facility].  Also her youngest son has began having difficulties he has had in the past.  Difficulty getting up and around in the mornings and not taking showers routinely.  She took away all of his electronic items.  She was also called in the school because of his behavior there was a heart teacher.  Frustrated by this but trying not to lose her temper.  Continues to try to emphasize her oldest son taking his psychiatric medications routinely.  Also discussed some family dynamics that have been interfering with her consistent parenting.  Her oldest son also got in trouble for getting into his cell phone while at school. Next: 1 week.

## 2024-09-04 ENCOUNTER — APPOINTMENT (OUTPATIENT)
Dept: ENDOCRINOLOGY | Facility: HOSPITAL | Age: 44
End: 2024-09-04
Payer: COMMERCIAL

## 2024-09-04 ENCOUNTER — APPOINTMENT (OUTPATIENT)
Dept: BEHAVIORAL HEALTH | Facility: CLINIC | Age: 44
End: 2024-09-04
Payer: COMMERCIAL

## 2024-09-04 DIAGNOSIS — F25.0 SCHIZOAFFECTIVE DISORDER, BIPOLAR TYPE (MULTI): ICD-10-CM

## 2024-09-04 PROCEDURE — 90834 PSYTX W PT 45 MINUTES: CPT | Performed by: PSYCHOLOGIST

## 2024-09-04 PROCEDURE — 3061F NEG MICROALBUMINURIA REV: CPT | Performed by: PSYCHOLOGIST

## 2024-09-04 NOTE — PROGRESS NOTES
11 AM [id number] [facility] Psych for Schizoaffective Dx, Bipolar Type (45 MIN, [phone])  Virtual. Supportive Therapy. talk to  recently regarding her son Tavon.  Some of this involves her ex-.  Her son had most recent communication with her ex- a month ago.  Feels like she needs some help  with managing son's behavior.   Also wants to get ex-husbands emergency contact information.  Some concerns about her son talking about being bisexual.  Also concerned about some exposure to pornography and [employer] online sites.  Once her oldest son to be more consistent with taking his medications and taking care of his teeth.  He has recently had some significant dental work.  Discussed her sister and some concerns about how her sister parents her child.  Some discussions about getting a parent advocate but quite expensive than it is probably not going to be sustainable for her. Next: 2 weeks.

## 2024-09-13 DIAGNOSIS — I10 PRIMARY HYPERTENSION: ICD-10-CM

## 2024-09-17 RX ORDER — AMLODIPINE BESYLATE 10 MG/1
10 TABLET ORAL DAILY
Qty: 90 TABLET | Refills: 3 | Status: SHIPPED | OUTPATIENT
Start: 2024-09-17 | End: 2025-09-17

## 2024-09-18 ENCOUNTER — TELEMEDICINE (OUTPATIENT)
Dept: BEHAVIORAL HEALTH | Facility: CLINIC | Age: 44
End: 2024-09-18
Payer: COMMERCIAL

## 2024-09-18 ENCOUNTER — APPOINTMENT (OUTPATIENT)
Dept: BEHAVIORAL HEALTH | Facility: CLINIC | Age: 44
End: 2024-09-18
Payer: COMMERCIAL

## 2024-09-18 DIAGNOSIS — F25.0 SCHIZOAFFECTIVE DISORDER, BIPOLAR TYPE (MULTI): ICD-10-CM

## 2024-09-18 DIAGNOSIS — F33.1 DEPRESSION, MAJOR, RECURRENT, MODERATE: ICD-10-CM

## 2024-09-18 DIAGNOSIS — G25.9 EXTRAPYRAMIDAL REACTION: ICD-10-CM

## 2024-09-18 PROCEDURE — 3061F NEG MICROALBUMINURIA REV: CPT | Performed by: PSYCHOLOGIST

## 2024-09-18 PROCEDURE — 3061F NEG MICROALBUMINURIA REV: CPT | Performed by: CLINICAL NURSE SPECIALIST

## 2024-09-18 PROCEDURE — 99212 OFFICE O/P EST SF 10 MIN: CPT | Performed by: CLINICAL NURSE SPECIALIST

## 2024-09-18 PROCEDURE — 90834 PSYTX W PT 45 MINUTES: CPT | Performed by: PSYCHOLOGIST

## 2024-09-18 RX ORDER — LAMOTRIGINE 150 MG/1
150 TABLET ORAL DAILY
Qty: 90 TABLET | Refills: 0 | Status: SHIPPED | OUTPATIENT
Start: 2024-09-18 | End: 2024-12-17

## 2024-09-18 RX ORDER — BENZTROPINE MESYLATE 1 MG/1
1 TABLET ORAL 2 TIMES DAILY
Qty: 180 TABLET | Refills: 0 | Status: SHIPPED | OUTPATIENT
Start: 2024-09-18 | End: 2024-12-17

## 2024-09-18 RX ORDER — ZIPRASIDONE HYDROCHLORIDE 20 MG/1
20 CAPSULE ORAL
Qty: 60 CAPSULE | Refills: 1 | Status: SHIPPED | OUTPATIENT
Start: 2024-09-18 | End: 2024-10-18

## 2024-09-18 RX ORDER — LAMOTRIGINE 200 MG/1
TABLET ORAL
Qty: 90 TABLET | Refills: 0 | Status: SHIPPED | OUTPATIENT
Start: 2024-09-18

## 2024-09-18 RX ORDER — BUPROPION HYDROCHLORIDE 300 MG/1
300 TABLET ORAL
Qty: 90 TABLET | Refills: 0 | Status: SHIPPED | OUTPATIENT
Start: 2024-09-18 | End: 2024-12-17

## 2024-09-18 NOTE — PROGRESS NOTES
11 AM [address] [facility] for Schizoaffective Dx, Bipolar Type (45 MIN, 1741-1425)  Virtual. Supportive Therapy.  Patient reported that she has been managing son's behavior better.  Attributes this to his doubling up on his clonidine medication.  Continues still have struggles with getting him to comply with showers.  Some concerns with side effects to his medication.  I have encouraged her to reach out to his pharmacologist.  Discussed some medical concerns that she has.  Based on seeing some labs in her MyChart she is convinced that she has a UTI.  Is unclear at this point how she will manage that.  Sees pharmacologist the day. Next: 1 week.

## 2024-09-18 NOTE — PROGRESS NOTES
Outpatient Psychiatry      Subjective   December Mack, a 44 y.o. female, presents for follow up for outpatient psychiatry /medication management appointment for an audio visual appointment in real time as an established patient , she was at home for the appointment . She verbally consented to the appointment       Diagnosis:   Schizoaffective disorder (295.70) (F25.9) bipolar type primary diagnoses moderate  Sleep difficulties moderate  Trauma and stressor-related disorder moderate  ESTUARDO (generalized anxiety disorder) moderate  Attention deficit disorder mild         Problem List         Patient Active Problem List   Diagnosis    Chest congestion    Gastroesophageal reflux disease without esophagitis    Vitamin D deficiency    Chronic left shoulder pain    Abnormal thyroid function test    ADD (attention deficit disorder)    Anxiety disorder    Callus of foot    Depression    Diabetes mellitus due to underlying condition with hyperosmolarity without coma (Multi)    Hair loss    Alopecia areata, unspecified    Asthma ([facility])    Chest pain    Fatigue    History of metabolic syndrome    Hoarseness of voice    Hx of thyroid cancer    Hypertension, essential    Mediastinal lymphadenopathy    Metatarsalgia of both feet    Onychomycosis    Postoperative hypothyroidism    Schizoaffective disorder (Multi)    Sleep difficulties    Substance-related disorder (Multi)    Thyroid nodule    Tobacco use disorder    Vision changes    Left shoulder pain    Dental caries    History of malignant neoplasm of thyroid    Pre-ulcerative calluses    Trauma and stressor-related disorder    Tremor         Treatment Plan/Recommendations: 4-6 weeks follow up , can continue self care and wellness efforts and maintain routine health screenings.can call  for treatment and scheduling concerns.     Follow-up plan was discussed with patient.  Psychotropic medications :  Continue benztropine 1 mg, twice a day for  restlessness/eps  Continue Bupropion xl 300 mg daily each morning for depression and concentration and focus /add  Continue lamotrigine 150 mg , each morning and 200 mg  tablet each night for schitzoaffective disorder bipolar type /mood stability   Continue caplyta 10.5 mg , daily for schitzoaffective disorder bipolar type/mood stability for now and can stop in one week   Start ziprasidone 20 mg , daily for one week then 20 mg , twice a day and stay at that dose      Review with patient: Treatment plan reviewed with the patient.  Medication risks/benefit reviewed with the patient     HPI:  reconciled and reviewed medication list in the [facility] emr and provided psychoeducation   support provided  reviewed and reconciled medication list in the [facility] emr   she spoke about stressors and intends to continue therapy   says mood can get anxious and depressed over stressors , support provided    Tolerating medications    She is fatigued , sleep varies depends on stressors   Reviewed meds options and she agrees to the plan   Support provided for stressors , encouraged time for herself with self care   She intends to continue therapy     Psych ros and medical ros as noted above     Current Outpatient Medications:     amLODIPine (Norvasc) 10 mg tablet, Take 1 tablet (10 mg) by mouth once daily., Disp: 90 tablet, Rfl: 3    atorvastatin (Lipitor) 40 mg tablet, Take 1 tablet (40 mg) by mouth once daily. For your elevated cholesterol levels., Disp: 90 tablet, Rfl: 3    benztropine (Cogentin) 1 mg tablet, Take 1 tablet (1 mg) by mouth 2 times a day., Disp: 180 tablet, Rfl: 0    buPROPion XL (Wellbutrin XL) 300 mg 24 hr tablet, Take 1 tablet (300 mg) by mouth once daily in the morning. Take before meals., Disp: 90 tablet, Rfl: 0    cholecalciferol (Vitamin D-3) 50 MCG (2000 UT) tablet, TAKE 1 TABLET BY MOUTH EVERY DAY, Disp: 90 tablet, Rfl: 0    diclofenac sodium (Voltaren) 1 % gel, Apply 4.5 inches (4 g) topically 4 times a day.,  Disp: 100 g, Rfl: 1    ibuprofen 800 mg tablet, Take by mouth., Disp: , Rfl:     lamoTRIgine (LaMICtal) 150 mg tablet, Take 1 tablet (150 mg) by mouth once daily. Each morning. Also is prescribed 200 mg daily each night at bedtime, Disp: 90 tablet, Rfl: 0    lamoTRIgine (LaMICtal) 200 mg tablet, 1 tablet daily each night at bedtime, also is prescribed 150 mg , each morning, Disp: 90 tablet, Rfl: 0    levothyroxine (Synthroid, Levoxyl) 150 mcg tablet, Take 1 tablet (150 mcg) by mouth once daily., Disp: 90 tablet, Rfl: 0    loratadine (Claritin) 10 mg tablet, Take by mouth., Disp: , Rfl:     lumateperone (Caplyta) 10.5 mg capsule, Take 10.5 mg by mouth once daily., Disp: 90 capsule, Rfl: 0    omeprazole (PriLOSEC) 40 mg DR capsule, Take 1 capsule (40 mg) by mouth once daily in the morning. Take before meals., Disp: 90 capsule, Rfl: 3    ziprasidone (Geodon) 20 mg capsule, Take 1 capsule (20 mg) by mouth 2 times daily (morning and late afternoon). Cross titrating off caplyta , given instructions, Disp: 60 capsule, Rfl: 1  Medical History:  Past Medical History:   Diagnosis Date    Acute upper respiratory infection, unspecified 12/08/2016    URI, acute    Cannabis abuse, uncomplicated     Marijuana abuse, continuous    Encounter for general adult medical examination without abnormal findings 08/12/2018    Routine history and physical examination of adult    Encounter for screening for respiratory tuberculosis 12/08/2016    Tuberculosis screening    Other abnormality of red blood cells 07/15/2019    Microcytosis    Other psychoactive substance use, unspecified with unspecified psychoactive substance-induced disorder (Multi)     Substance-related disorder    Pain in right knee 01/11/2018    Bilateral knee pain    Personal history of malignant neoplasm of thyroid     History of thyroid cancer    Personal history of malignant neoplasm of thyroid 10/14/2021    History of malignant neoplasm of thyroid    Personal history of  malignant neoplasm of thyroid 12/15/2016    History of thyroid cancer    Personal history of other diseases of the nervous system and sense organs 03/23/2016    History of seizure disorder    Personal history of other diseases of the respiratory system     History of allergic rhinitis    Personal history of other diseases of the respiratory system 08/06/2018    History of allergic rhinitis    Personal history of other drug therapy 12/08/2016    History of influenza vaccination    Personal history of other endocrine, nutritional and metabolic disease 08/06/2018    History of hypercalcemia    Personal history of other endocrine, nutritional and metabolic disease 08/02/2018    History of vitamin D deficiency    Personal history of other infectious and parasitic diseases 08/20/2016    History of onychomycosis    Personal history of other mental and behavioral disorders     History of affective disorder    Personal history of other specified conditions 07/12/2021    History of dysuria    Personal history of other specified conditions 04/13/2018    History of fatigue    Vitamin D deficiency, unspecified     Vitamin D deficiency     Surgical History:  Past Surgical History:   Procedure Laterality Date    THYROID SURGERY  08/19/2016    Thyroid Surgery     Family History:  Family History   Problem Relation Name Age of Onset    Other (Malignant neoplasm of breast) Other Aunt     Cancer Other Aunt     Diabetes Other Uncle      Vitals:  There were no vitals filed for this visit.    Reba Stauffer, APRN-CNS

## 2024-09-25 ENCOUNTER — APPOINTMENT (OUTPATIENT)
Dept: BEHAVIORAL HEALTH | Facility: CLINIC | Age: 44
End: 2024-09-25
Payer: COMMERCIAL

## 2024-09-25 DIAGNOSIS — F25.0 SCHIZOAFFECTIVE DISORDER, BIPOLAR TYPE (MULTI): ICD-10-CM

## 2024-09-25 PROCEDURE — 3061F NEG MICROALBUMINURIA REV: CPT | Performed by: PSYCHOLOGIST

## 2024-09-25 PROCEDURE — 90834 PSYTX W PT 45 MINUTES: CPT | Performed by: PSYCHOLOGIST

## 2024-09-25 NOTE — PROGRESS NOTES
11 AM [address] [facility] Psych for Schizoaffective Dx, Bipolar Type (45 MIN, [phone])  Virtual. Supportive Therapy.  Patient has retained an  regarding her son's (Tavon) father.  Although he pays some child support believes that  should not be declaring son on his taxes.  She is long-term parent and he has been inconsistent with child support.  Will see what happens.  Spent much of the time discussing him making promises to their son that he did not keep.  Patient frustrated by this and the impacted sound her son.  Spent most the time discussing this.  Also some discussed relationship with mother's  and difficulties mother has had with him. Next: 1 week.

## 2024-10-02 ENCOUNTER — APPOINTMENT (OUTPATIENT)
Dept: BEHAVIORAL HEALTH | Facility: CLINIC | Age: 44
End: 2024-10-02
Payer: COMMERCIAL

## 2024-10-02 DIAGNOSIS — F25.0 SCHIZOAFFECTIVE DISORDER, BIPOLAR TYPE (MULTI): ICD-10-CM

## 2024-10-02 PROCEDURE — 3061F NEG MICROALBUMINURIA REV: CPT | Performed by: PSYCHOLOGIST

## 2024-10-02 PROCEDURE — 90834 PSYTX W PT 45 MINUTES: CPT | Performed by: PSYCHOLOGIST

## 2024-10-02 NOTE — PROGRESS NOTES
11 AM [address] [facility] Psych for Schizoaffective Dx, Bipolar Type (45 MIN, [phone])  Virtual. Supportive Therapy.  Patient anxious and overwhelmed.  Spent most of the session discussing her getting on her account for [employer] and seeing a warning sign.  Catastrophizing about the situation because she does not have information.  Worried about her kids and what would become of them if something happens.  We discussed the importance of trying to stay in the present and getting the additional information about what the warning signs for.  In the time that we were talking she also became aware that maybe her daughter turning 18 is a relevant issue that may relate to this.  Will gather more information. Next: 1 week.

## 2024-10-09 ENCOUNTER — APPOINTMENT (OUTPATIENT)
Dept: BEHAVIORAL HEALTH | Facility: CLINIC | Age: 44
End: 2024-10-09
Payer: COMMERCIAL

## 2024-10-09 DIAGNOSIS — F25.0 SCHIZOAFFECTIVE DISORDER, BIPOLAR TYPE (MULTI): ICD-10-CM

## 2024-10-09 PROCEDURE — 3061F NEG MICROALBUMINURIA REV: CPT | Performed by: PSYCHOLOGIST

## 2024-10-09 PROCEDURE — 90837 PSYTX W PT 60 MINUTES: CPT | Performed by: PSYCHOLOGIST

## 2024-10-09 NOTE — PROGRESS NOTES
11 AM [address] [facility] Psych for Schizoaffective Dx, Bipolar Type (60 MIN, [phone])  Virtual. Supportive Therapy.  Patient reported medication change now taking Geodon, no longer taking Caplyta.  Spent most of the session discussing her 3 kids.  Continues to be most concerned with her son Tavon.  Son continues to have bowel movement problems and is resistant to taking the laxatives and medication for this.  Her son is also taking clonidine and will speak with patient's pharmacologist later today.  Concerned about son's hopeless ideation which she has discussed with both his pharmacologist and therapist.  Discussed concerns with her son, Murphy,.  Also discussed problems related to her daughter who turned 18 this past Monday. Next: 1 week.

## 2024-10-16 ENCOUNTER — APPOINTMENT (OUTPATIENT)
Dept: BEHAVIORAL HEALTH | Facility: CLINIC | Age: 44
End: 2024-10-16
Payer: COMMERCIAL

## 2024-10-16 DIAGNOSIS — F25.0 SCHIZOAFFECTIVE DISORDER, BIPOLAR TYPE (MULTI): ICD-10-CM

## 2024-10-16 PROCEDURE — 90834 PSYTX W PT 45 MINUTES: CPT | Performed by: PSYCHOLOGIST

## 2024-10-16 PROCEDURE — 3061F NEG MICROALBUMINURIA REV: CPT | Performed by: PSYCHOLOGIST

## 2024-10-16 NOTE — PROGRESS NOTES
"11 AM [address] [facility] for Schizoaffective Dx, Bipolar Type (60 MIN, [phone])  Virtual. Supportive Therapy.  Patient had a stressful week.  Has an appointment with  next week to discuss her tach situation as a relates to her children.  Now back on Geodon and takes that in conjunction with Cogentin.  Spent most the time discussing her frustration with her son Tavon who continues to have toilet and bowel problems.  He has been noncompliant with getting into the shower and taking his MiraLAX and other stuff to keep him regular.  \"Tired of fighting him.\"  She is considering allowing him to go with his father and is also mentioned group homes and foster care.  Feels that some of son's behaviors intentional but this is unclear.  Feeling that she sacrificing too much and is having a difficult time managing the stress. Next; 1 week.  "

## 2024-10-17 ENCOUNTER — APPOINTMENT (OUTPATIENT)
Dept: BEHAVIORAL HEALTH | Facility: CLINIC | Age: 44
End: 2024-10-17
Payer: COMMERCIAL

## 2024-10-17 DIAGNOSIS — F98.8 ATTENTION DEFICIT DISORDER, UNSPECIFIED TYPE: ICD-10-CM

## 2024-10-17 DIAGNOSIS — F41.1 GAD (GENERALIZED ANXIETY DISORDER): ICD-10-CM

## 2024-10-17 DIAGNOSIS — G25.89 COMBINED PYRAMIDAL-EXTRAPYRAMIDAL SYNDROME: ICD-10-CM

## 2024-10-17 DIAGNOSIS — F43.9 TRAUMA AND STRESSOR-RELATED DISORDER: ICD-10-CM

## 2024-10-17 DIAGNOSIS — G47.9 SLEEP DIFFICULTIES: ICD-10-CM

## 2024-10-17 DIAGNOSIS — F33.1 DEPRESSION, MAJOR, RECURRENT, MODERATE: ICD-10-CM

## 2024-10-17 DIAGNOSIS — F25.0 SCHIZOAFFECTIVE DISORDER, BIPOLAR TYPE (MULTI): ICD-10-CM

## 2024-10-17 RX ORDER — BUPROPION HYDROCHLORIDE 300 MG/1
300 TABLET ORAL
Qty: 90 TABLET | Refills: 0 | Status: SHIPPED | OUTPATIENT
Start: 2024-10-17 | End: 2025-01-15

## 2024-10-17 RX ORDER — BENZTROPINE MESYLATE 2 MG/1
2 TABLET ORAL 2 TIMES DAILY
Qty: 180 TABLET | Refills: 0 | Status: SHIPPED | OUTPATIENT
Start: 2024-10-17 | End: 2025-01-15

## 2024-10-17 RX ORDER — ZIPRASIDONE HYDROCHLORIDE 40 MG/1
40 CAPSULE ORAL
Qty: 180 CAPSULE | Refills: 0 | Status: SHIPPED | OUTPATIENT
Start: 2024-10-17 | End: 2025-01-15

## 2024-10-17 RX ORDER — LAMOTRIGINE 200 MG/1
TABLET ORAL
Qty: 90 TABLET | Refills: 0 | Status: SHIPPED | OUTPATIENT
Start: 2024-10-17

## 2024-10-17 RX ORDER — LAMOTRIGINE 150 MG/1
150 TABLET ORAL DAILY
Qty: 90 TABLET | Refills: 0 | Status: SHIPPED | OUTPATIENT
Start: 2024-10-17 | End: 2025-01-15

## 2024-10-17 NOTE — PROGRESS NOTES
Outpatient Psychiatry      Subjective   Luca Delgado, a 44 y.o. female,  presents for a follow up for outpatient psychiatry /medication management appointment for an audio video virtual appointment as an established patient , she was at home for the appointment .   Virtual or Telephone Consent    An interactive audio and video telecommunication system which permits real time communications between the patient (at the originating site) and provider (at the distant site) was utilized to provide this telehealth service.   Verbal consent was requested and obtained from Luca Delgado on this date, 10/17/24 for a telehealth visit.        Diagnosis:   Schizoaffective disorder  bipolar type primary diagnoses moderate F 25.0  Sleep difficulties moderate G47.9  Trauma and stressor-related disorder moderate F43.9  ESTUARDO (generalized anxiety disorder) moderate F41.1  Attention deficit disorder unspecified mild F98.8        Problem List         Patient Active Problem List   Diagnosis    Chest congestion    Gastroesophageal reflux disease without esophagitis    Vitamin D deficiency    Chronic left shoulder pain    Abnormal thyroid function test    ADD (attention deficit disorder)    Anxiety disorder    Callus of foot    Depression    Diabetes mellitus due to underlying condition with hyperosmolarity without coma (Multi)    Hair loss    Alopecia areata, unspecified    Asthma ([facility])    Chest pain    Fatigue    History of metabolic syndrome    Hoarseness of voice    Hx of thyroid cancer    Hypertension, essential    Mediastinal lymphadenopathy    Metatarsalgia of both feet    Onychomycosis    Postoperative hypothyroidism    Schizoaffective disorder (Multi)    Sleep difficulties    Substance-related disorder (Multi)    Thyroid nodule    Tobacco use disorder    Vision changes    Left shoulder pain    Dental caries    History of malignant neoplasm of thyroid    Pre-ulcerative calluses    Trauma and stressor-related disorder    Tremor          Treatment Plan/Recommendations: 4-6 weeks follow up , can continue self care and wellness efforts and maintain routine health screenings.can call  for treatment and scheduling concerns.     Follow-up plan was discussed with patient.  Psychotropic medications :  Continue benztropine 1 mg, twice a day for restlessness/eps  Continue Bupropion xl 300 mg daily each morning for depression and concentration and focus /add  Continue lamotrigine 150 mg , each morning and 200 mg  tablet each night for schitzoaffective disorder bipolar type /mood stability   Increase ziprasidone 40 mg  twice a day for schitzoaffective disorder , bipolar type /mood stability      Review with patient: Treatment plan reviewed with the patient.  Medication risks/benefit reviewed with the patient     HPI:  reconciled and reviewed medication list in the [facility] emr and provided psychoeducation   support provided  reviewed and reconciled medication list in the [facility] emr   she spoke about stressors and intends to continue therapy   says mood can get anxious and depressed over stressors , support provided    Tolerating medications    She is fatigued , sleep varies depends on stressors   Reviewed meds options and she agrees to the plan   Support provided for stressors , encouraged time for herself with self care   She intends to continue therapy     Psych ros and medical ros as noted above      Diagnosis:   Patient Active Problem List   Diagnosis    Chest congestion    Gastroesophageal reflux disease without esophagitis    Vitamin D deficiency    Chronic left shoulder pain    Abnormal thyroid function test    ADD (attention deficit disorder)    Anxiety disorder    Callus of foot    Depression    Diabetes mellitus due to underlying condition with hyperosmolarity without coma    Hair loss    Alopecia areata, unspecified    Asthma    Chest pain    Fatigue    History of metabolic syndrome    Hoarseness of voice    Hx of thyroid cancer     Hypertension, essential    Mediastinal lymphadenopathy    Metatarsalgia of both feet    Onychomycosis    Postoperative hypothyroidism    Schizoaffective disorder (Multi)    Sleep difficulties    Substance-related disorder (Multi)    Thyroid nodule    Tobacco use disorder    Vision changes    Left shoulder pain    Dental caries    History of malignant neoplasm of thyroid    Pre-ulcerative calluses    Trauma and stressor-related disorder    Tremor     Current Outpatient Medications:     amLODIPine (Norvasc) 10 mg tablet, Take 1 tablet (10 mg) by mouth once daily., Disp: 90 tablet, Rfl: 3    atorvastatin (Lipitor) 40 mg tablet, Take 1 tablet (40 mg) by mouth once daily. For your elevated cholesterol levels., Disp: 90 tablet, Rfl: 3    benztropine (Cogentin) 1 mg tablet, Take 1 tablet (1 mg) by mouth 2 times a day., Disp: 180 tablet, Rfl: 0    buPROPion XL (Wellbutrin XL) 300 mg 24 hr tablet, Take 1 tablet (300 mg) by mouth once daily in the morning. Take before meals., Disp: 90 tablet, Rfl: 0    cholecalciferol (Vitamin D-3) 50 MCG (2000 UT) tablet, TAKE 1 TABLET BY MOUTH EVERY DAY, Disp: 90 tablet, Rfl: 0    diclofenac sodium (Voltaren) 1 % gel, Apply 4.5 inches (4 g) topically 4 times a day., Disp: 100 g, Rfl: 1    ibuprofen 800 mg tablet, Take by mouth., Disp: , Rfl:     lamoTRIgine (LaMICtal) 150 mg tablet, Take 1 tablet (150 mg) by mouth once daily. Each morning. Also is prescribed 200 mg daily each night at bedtime, Disp: 90 tablet, Rfl: 0    lamoTRIgine (LaMICtal) 200 mg tablet, 1 tablet daily each night at bedtime, also is prescribed 150 mg , each morning, Disp: 90 tablet, Rfl: 0    levothyroxine (Synthroid, Levoxyl) 150 mcg tablet, Take 1 tablet (150 mcg) by mouth once daily., Disp: 90 tablet, Rfl: 0    loratadine (Claritin) 10 mg tablet, Take by mouth., Disp: , Rfl:     lumateperone (Caplyta) 10.5 mg capsule, Take 10.5 mg by mouth once daily., Disp: 90 capsule, Rfl: 0    omeprazole (PriLOSEC) 40 mg DR  capsule, Take 1 capsule (40 mg) by mouth once daily in the morning. Take before meals., Disp: 90 capsule, Rfl: 3    ziprasidone (Geodon) 20 mg capsule, Take 1 capsule (20 mg) by mouth 2 times daily (morning and late afternoon). Cross titrating off caplyta , given instructions, Disp: 60 capsule, Rfl: 1  Medical History:  Past Medical History:   Diagnosis Date    Acute upper respiratory infection, unspecified 12/08/2016    URI, acute    Cannabis abuse, uncomplicated     Marijuana abuse, continuous    Encounter for general adult medical examination without abnormal findings 08/12/2018    Routine history and physical examination of adult    Encounter for screening for respiratory tuberculosis 12/08/2016    Tuberculosis screening    Other abnormality of red blood cells 07/15/2019    Microcytosis    Other psychoactive substance use, unspecified with unspecified psychoactive substance-induced disorder (Multi)     Substance-related disorder    Pain in right knee 01/11/2018    Bilateral knee pain    Personal history of malignant neoplasm of thyroid     History of thyroid cancer    Personal history of malignant neoplasm of thyroid 10/14/2021    History of malignant neoplasm of thyroid    Personal history of malignant neoplasm of thyroid 12/15/2016    History of thyroid cancer    Personal history of other diseases of the nervous system and sense organs 03/23/2016    History of seizure disorder    Personal history of other diseases of the respiratory system     History of allergic rhinitis    Personal history of other diseases of the respiratory system 08/06/2018    History of allergic rhinitis    Personal history of other drug therapy 12/08/2016    History of influenza vaccination    Personal history of other endocrine, nutritional and metabolic disease 08/06/2018    History of hypercalcemia    Personal history of other endocrine, nutritional and metabolic disease 08/02/2018    History of vitamin D deficiency    Personal  history of other infectious and parasitic diseases 08/20/2016    History of onychomycosis    Personal history of other mental and behavioral disorders     History of affective disorder    Personal history of other specified conditions 07/12/2021    History of dysuria    Personal history of other specified conditions 04/13/2018    History of fatigue    Vitamin D deficiency, unspecified     Vitamin D deficiency     Surgical History:  Past Surgical History:   Procedure Laterality Date    THYROID SURGERY  08/19/2016    Thyroid Surgery     Family History:  Family History   Problem Relation Name Age of Onset    Other (Malignant neoplasm of breast) Other Aunt     Cancer Other Aunt     Diabetes Other Uncle      Social History:  Social History     Socioeconomic History    Marital status: Single     Spouse name: Not on file    Number of children: Not on file    Years of education: Not on file    Highest education level: Not on file   Occupational History    Not on file   Tobacco Use    Smoking status: Every Day     Types: Cigarettes     Passive exposure: Past    Smokeless tobacco: Former   Substance and Sexual Activity    Alcohol use: Yes    Drug use: Never    Sexual activity: Not on file   Other Topics Concern    Not on file   Social History Narrative    Not on file     Social Drivers of Health     Financial Resource Strain: Not on file   Food Insecurity: Not on file   Transportation Needs: High Risk (5/15/2024)    Received from [facility] SDOH Screening     Has lack of transportation kept you from medical appointments, meetings, work or from getting things needed for daily living? choose all that apply.: Yes, it has kept me from non-medical meetings, appointments, work or from getting things that i need     Has lack of transportation kept you from medical appointments, meetings, work or from getting things needed for daily living? choose all that apply.: Yes, it has kept me from medical appointments or from  getting my medications   Physical Activity: Not on file   Stress: Not on file   Social Connections: Not on file   Intimate Partner Violence: Not on file   Housing Stability: Not on file     Vitals:  There were no vitals filed for this visit.    Reba Stauffer, APRN-CNS

## 2024-10-22 ENCOUNTER — APPOINTMENT (OUTPATIENT)
Dept: BEHAVIORAL HEALTH | Facility: CLINIC | Age: 44
End: 2024-10-22
Payer: COMMERCIAL

## 2024-10-22 DIAGNOSIS — F25.0 SCHIZOAFFECTIVE DISORDER, BIPOLAR TYPE (MULTI): ICD-10-CM

## 2024-10-22 PROCEDURE — 3061F NEG MICROALBUMINURIA REV: CPT | Performed by: PSYCHOLOGIST

## 2024-10-22 PROCEDURE — 90834 PSYTX W PT 45 MINUTES: CPT | Performed by: PSYCHOLOGIST

## 2024-10-22 NOTE — PROGRESS NOTES
"3 PM [id number] [facility] for Schizoaffective Dx, Bipolar Type (45 MIN, [phone])  Virtual. Supportive Therapy.  Had connection problems the state with patient through telehealth.  Spent majority the time discussing her frustration with her son, Tavon.  She is beside herself and knowing what to do.  Reported several times that he got frustrated and ran off the porch to the park next[place]door.  Had a hard time getting him to comply with behavior.  He continues to be managed on clonidine.  Reported she is also concerned with his gender identity problems.  Sees him gradually becoming his father.  Said that she believes that her ex partner, son's father may have also had autistic spectrum disorder.  Discussed situation between Tavon and his older brother that led to Tavon getting frustrated.  Sees youngest son as a \"pathological liar, all about him.\"  \"I am tired done.\"  She mentioned how difficult it is to be a mature adult.  Reported that her oldest son is doing well in his classes which has been surprising to her.  Reported that he is not getting lower than A's and B's.  He currently attends [employer].  He is trying to troubleshoot her youngest son's mental health treatment as well as try to figure out some ways to get increased compliance at home. Next: 1 week.  "

## 2024-10-23 ENCOUNTER — APPOINTMENT (OUTPATIENT)
Dept: ENDOCRINOLOGY | Facility: CLINIC | Age: 44
End: 2024-10-23
Payer: COMMERCIAL

## 2024-10-30 ENCOUNTER — APPOINTMENT (OUTPATIENT)
Dept: BEHAVIORAL HEALTH | Facility: CLINIC | Age: 44
End: 2024-10-30
Payer: COMMERCIAL

## 2024-10-31 ENCOUNTER — TELEPHONE (OUTPATIENT)
Dept: BEHAVIORAL HEALTH | Facility: CLINIC | Age: 44
End: 2024-10-31
Payer: COMMERCIAL

## 2024-11-04 ENCOUNTER — TELEPHONE (OUTPATIENT)
Dept: OTHER | Age: 44
End: 2024-11-04
Payer: COMMERCIAL

## 2024-11-05 ENCOUNTER — DOCUMENTATION (OUTPATIENT)
Dept: BEHAVIORAL HEALTH | Facility: CLINIC | Age: 44
End: 2024-11-05
Payer: COMMERCIAL

## 2024-11-05 ENCOUNTER — TELEPHONE (OUTPATIENT)
Dept: BEHAVIORAL HEALTH | Facility: CLINIC | Age: 44
End: 2024-11-05
Payer: COMMERCIAL

## 2024-11-05 ENCOUNTER — TELEPHONE (OUTPATIENT)
Dept: OTHER | Age: 44
End: 2024-11-05
Payer: COMMERCIAL

## 2024-11-05 DIAGNOSIS — F25.0 SCHIZOAFFECTIVE DISORDER, BIPOLAR TYPE (MULTI): ICD-10-CM

## 2024-11-05 RX ORDER — ZIPRASIDONE HYDROCHLORIDE 40 MG/1
40 CAPSULE ORAL
Qty: 180 CAPSULE | Refills: 0 | Status: SHIPPED | OUTPATIENT
Start: 2024-11-05 | End: 2025-02-03

## 2024-11-05 NOTE — PROGRESS NOTES
Nonbillable note : resent the script for ziprasidone to pharmacy after patient states pharmacy says they dont have the 40 mg , ziprasidone script , reviewed [facility] emr [facility] cns

## 2024-11-05 NOTE — TELEPHONE ENCOUNTER
Pt has continually contacted [facility] pharmacy for the Geodon refill and repeatedly told that they don't have the 10.17 authorization for ziprasidone 40 mgs    [facility], [phone]    Please contact pharmacy to resolve ASAP

## 2024-11-06 ENCOUNTER — APPOINTMENT (OUTPATIENT)
Dept: BEHAVIORAL HEALTH | Facility: CLINIC | Age: 44
End: 2024-11-06
Payer: COMMERCIAL

## 2024-11-15 ENCOUNTER — APPOINTMENT (OUTPATIENT)
Dept: BEHAVIORAL HEALTH | Facility: CLINIC | Age: 44
End: 2024-11-15

## 2024-11-15 DIAGNOSIS — F43.9 TRAUMA AND STRESSOR-RELATED DISORDER: ICD-10-CM

## 2024-11-15 DIAGNOSIS — F25.0 SCHIZOAFFECTIVE DISORDER, BIPOLAR TYPE (MULTI): ICD-10-CM

## 2024-11-15 DIAGNOSIS — F98.8 ATTENTION DEFICIT DISORDER, UNSPECIFIED TYPE: ICD-10-CM

## 2024-11-15 DIAGNOSIS — G47.00 INSOMNIA, UNSPECIFIED TYPE: ICD-10-CM

## 2024-11-15 DIAGNOSIS — F41.1 GAD (GENERALIZED ANXIETY DISORDER): ICD-10-CM

## 2024-11-15 RX ORDER — ZIPRASIDONE HYDROCHLORIDE 60 MG/1
60 CAPSULE ORAL
Qty: 60 CAPSULE | Refills: 2 | Status: SHIPPED | OUTPATIENT
Start: 2024-11-15 | End: 2025-02-13

## 2024-11-15 NOTE — PROGRESS NOTES
Outpatient Psychiatry      Subjective   Luca Delgado, a 44 y.o. female, presents for a follow up for outpatient psychiatry /medication management appointment for an audio video virtual appointment as an established patient , she was at home for the appointment .   Virtual or Telephone Consent     An interactive audio and video telecommunication system which permits real time communications between the patient (at the originating site) and provider (at the distant site) was utilized to provide this telehealth service.   Verbal consent was requested and obtained from Luca Delgado on this date, 11/15/24 for a telehealth visit.        Diagnosis:   Schizoaffective disorder  bipolar type primary diagnoses moderate F 25.0  Insomnia unspecified type G47.00 moderate   Trauma and stressor-related disorder moderate F43.9  ESTUARDO (generalized anxiety disorder) moderate F41.1  Attention deficit disorder unspecified mild F98.8        Problem List         Patient Active Problem List   Diagnosis    Chest congestion    Gastroesophageal reflux disease without esophagitis    Vitamin D deficiency    Chronic left shoulder pain    Abnormal thyroid function test    ADD (attention deficit disorder)    Anxiety disorder    Callus of foot    Depression    Diabetes mellitus due to underlying condition with hyperosmolarity without coma (Multi)    Hair loss    Alopecia areata, unspecified    Asthma ([facility])    Chest pain    Fatigue    History of metabolic syndrome    Hoarseness of voice    Hx of thyroid cancer    Hypertension, essential    Mediastinal lymphadenopathy    Metatarsalgia of both feet    Onychomycosis    Postoperative hypothyroidism    Schizoaffective disorder (Multi)    Sleep difficulties    Substance-related disorder (Multi)    Thyroid nodule    Tobacco use disorder    Vision changes    Left shoulder pain    Dental caries    History of malignant neoplasm of thyroid    Pre-ulcerative calluses    Trauma and stressor-related disorder     Tremor         Treatment Plan/Recommendations: 4-6 weeks follow up , can continue self care and wellness efforts and maintain routine health screenings.can call  for treatment and scheduling concerns.     Follow-up plan was discussed with patient.  Psychotropic medications :  Continue benztropine 1 mg, twice a day for restlessness/eps  Continue Bupropion xl 300 mg daily each morning for depression and concentration and focus /add  Continue lamotrigine 150 mg , each morning and 200 mg  tablet each night for schitzoaffective disorder bipolar type /mood stability   Increase ziprasidone 60 mg  twice a day for schitzoaffective disorder , bipolar type /mood stability      Review with patient: Treatment plan reviewed with the patient.  Medication risks/benefit reviewed with the patient     HPI:  reconciled and reviewed medication list in the [facility]r and provided psychoeducation provided   support provided  reviewed and reconciled medication list in the [facility]   she spoke about stressors and intends to continue therapy   says mood can get anxious and depressed over stressors , but more so improved on current med regimen     Tolerating medications    She is fatigued , sleep varies depends on stressors   Reviewed meds options and she agrees to the plan   encouraged time for herself with self care   She intends to continue therapy     Psych ros and medical ros as noted above        Patient Active Problem List   Diagnosis    Chest congestion    Gastroesophageal reflux disease without esophagitis    Vitamin D deficiency    Chronic left shoulder pain    Abnormal thyroid function test    ADD (attention deficit disorder)    Anxiety disorder    Callus of foot    Depression    Diabetes mellitus due to underlying condition with hyperosmolarity without coma    Hair loss    Alopecia areata, unspecified    Asthma    Chest pain    Fatigue    History of metabolic syndrome    Hoarseness of voice    Hx of thyroid cancer     Hypertension, essential    Mediastinal lymphadenopathy    Metatarsalgia of both feet    Onychomycosis    Postoperative hypothyroidism    Schizoaffective disorder (Multi)    Sleep difficulties    Substance-related disorder (Multi)    Thyroid nodule    Tobacco use disorder    Vision changes    Left shoulder pain    Dental caries    History of malignant neoplasm of thyroid    Pre-ulcerative calluses    Trauma and stressor-related disorder    Tremor     Current Outpatient Medications:     amLODIPine (Norvasc) 10 mg tablet, Take 1 tablet (10 mg) by mouth once daily., Disp: 90 tablet, Rfl: 3    atorvastatin (Lipitor) 40 mg tablet, Take 1 tablet (40 mg) by mouth once daily. For your elevated cholesterol levels., Disp: 90 tablet, Rfl: 3    benztropine (Cogentin) 2 mg tablet, Take 1 tablet (2 mg) by mouth 2 times a day. No longer prescribed 1 mg twice a day, Disp: 180 tablet, Rfl: 0    buPROPion XL (Wellbutrin XL) 300 mg 24 hr tablet, Take 1 tablet (300 mg) by mouth once daily in the morning. Take before meals., Disp: 90 tablet, Rfl: 0    cholecalciferol (Vitamin D-3) 50 MCG (2000 UT) tablet, TAKE 1 TABLET BY MOUTH EVERY DAY, Disp: 90 tablet, Rfl: 0    diclofenac sodium (Voltaren) 1 % gel, Apply 4.5 inches (4 g) topically 4 times a day., Disp: 100 g, Rfl: 1    ibuprofen 800 mg tablet, Take by mouth., Disp: , Rfl:     lamoTRIgine (LaMICtal) 150 mg tablet, Take 1 tablet (150 mg) by mouth once daily. Each morning. Also is prescribed 200 mg daily each night at bedtime, Disp: 90 tablet, Rfl: 0    lamoTRIgine (LaMICtal) 200 mg tablet, 1 tablet daily each night at bedtime, also is prescribed 150 mg , each morning, Disp: 90 tablet, Rfl: 0    levothyroxine (Synthroid, Levoxyl) 150 mcg tablet, Take 1 tablet (150 mcg) by mouth once daily., Disp: 90 tablet, Rfl: 0    loratadine (Claritin) 10 mg tablet, Take by mouth., Disp: , Rfl:     omeprazole (PriLOSEC) 40 mg DR capsule, Take 1 capsule (40 mg) by mouth once daily in the morning.  Take before meals., Disp: 90 capsule, Rfl: 3    ziprasidone (Geodon) 40 mg capsule, Take 1 capsule (40 mg) by mouth 2 times daily (morning and late afternoon). This replaces order for 20 mg , twice a day, Disp: 180 capsule, Rfl: 0  Medical History:  Past Medical History:   Diagnosis Date    Acute upper respiratory infection, unspecified 12/08/2016    URI, acute    Cannabis abuse, uncomplicated     Marijuana abuse, continuous    Encounter for general adult medical examination without abnormal findings 08/12/2018    Routine history and physical examination of adult    Encounter for screening for respiratory tuberculosis 12/08/2016    Tuberculosis screening    Other abnormality of red blood cells 07/15/2019    Microcytosis    Other psychoactive substance use, unspecified with unspecified psychoactive substance-induced disorder (Multi)     Substance-related disorder    Pain in right knee 01/11/2018    Bilateral knee pain    Personal history of malignant neoplasm of thyroid     History of thyroid cancer    Personal history of malignant neoplasm of thyroid 10/14/2021    History of malignant neoplasm of thyroid    Personal history of malignant neoplasm of thyroid 12/15/2016    History of thyroid cancer    Personal history of other diseases of the nervous system and sense organs 03/23/2016    History of seizure disorder    Personal history of other diseases of the respiratory system     History of allergic rhinitis    Personal history of other diseases of the respiratory system 08/06/2018    History of allergic rhinitis    Personal history of other drug therapy 12/08/2016    History of influenza vaccination    Personal history of other endocrine, nutritional and metabolic disease 08/06/2018    History of hypercalcemia    Personal history of other endocrine, nutritional and metabolic disease 08/02/2018    History of vitamin D deficiency    Personal history of other infectious and parasitic diseases 08/20/2016    History of  onychomycosis    Personal history of other mental and behavioral disorders     History of affective disorder    Personal history of other specified conditions 07/12/2021    History of dysuria    Personal history of other specified conditions 04/13/2018    History of fatigue    Vitamin D deficiency, unspecified     Vitamin D deficiency     Surgical History:  Past Surgical History:   Procedure Laterality Date    THYROID SURGERY  08/19/2016    Thyroid Surgery     Family History:  Family History   Problem Relation Name Age of Onset    Other (Malignant neoplasm of breast) Other Aunt     Cancer Other Aunt     Diabetes Other Uncle      Social History:  Social History     Socioeconomic History    Marital status: Single     Spouse name: Not on file    Number of children: Not on file    Years of education: Not on file    Highest education level: Not on file   Occupational History    Not on file   Tobacco Use    Smoking status: Every Day     Types: Cigarettes     Passive exposure: Past    Smokeless tobacco: Former   Substance and Sexual Activity    Alcohol use: Yes    Drug use: Never    Sexual activity: Not on file   Other Topics Concern    Not on file   Social History Narrative    Not on file     Social Drivers of Health     Financial Resource Strain: Not on file   Food Insecurity: Not on file   Transportation Needs: High Risk (5/15/2024)    Received from [facility] SDOH Screening     Has lack of transportation kept you from medical appointments, meetings, work or from getting things needed for daily living? choose all that apply.: Yes, it has kept me from non-medical meetings, appointments, work or from getting things that i need     Has lack of transportation kept you from medical appointments, meetings, work or from getting things needed for daily living? choose all that apply.: Yes, it has kept me from medical appointments or from getting my medications   Physical Activity: Not on file   Stress: Not on file    Social Connections: Not on file   Intimate Partner Violence: Not on file   Housing Stability: Not on file     Vitals:  There were no vitals filed for this visit.    Reba Stauffer, APRN-CNS

## 2024-11-20 ENCOUNTER — APPOINTMENT (OUTPATIENT)
Dept: DERMATOLOGY | Facility: CLINIC | Age: 44
End: 2024-11-20
Payer: COMMERCIAL

## 2024-11-20 ENCOUNTER — TELEMEDICINE (OUTPATIENT)
Dept: BEHAVIORAL HEALTH | Facility: CLINIC | Age: 44
End: 2024-11-20
Payer: COMMERCIAL

## 2024-11-20 DIAGNOSIS — F25.0 SCHIZOAFFECTIVE DISORDER, BIPOLAR TYPE (MULTI): ICD-10-CM

## 2024-11-20 PROCEDURE — 90834 PSYTX W PT 45 MINUTES: CPT | Performed by: PSYCHOLOGIST

## 2024-11-20 PROCEDURE — 3061F NEG MICROALBUMINURIA REV: CPT | Performed by: PSYCHOLOGIST

## 2024-11-20 NOTE — PROGRESS NOTES
11 AM [id number] [facility] Psych for Schizoaffective Dx, Bipolar Type (45 MIN, [id number])  Virtual. Supportive Therapy.   Majority of session completed but session was interrupted when patient's phone overheated, had a complete session via phone.  Patient has been feeling depressed, anxious and overwhelmed.  She is discussing with the  regarding Tavon's care and is considered having him go to residential treatment at [facility] she had a recent IEP meeting at school and does not like how some of the IEP plan is being implemented.  Parents will also discussed with her son's hygiene which she has talked to him about since she has to push him to get showers any smells.  Also continues to have a difficult time managing her oldest son's (Murphy) behavior.  She has to consistently tell him to leave Tavon alone and try to be more supportive and not mess with him.  Spent most of the time discussing this.  Recently medications were changed.  She is currently on Geodon, 60 mg x 2/day, lamotrigine, 150 mg in the morning and 200 mg at night, Wellbutrin XL, 300 mg/day, and benzatropine, 1 mg x 2.  She wants to have more of the life of her own.  Hopes to get back to taking classes go going to school and also learning to drive.  Does not have a lot of social supports.  Boyfriend only supportive to a degree.  He does not like to hear about the kids and the struggle she has there.  Some concerns and anxiety about when her mother will die. Next: 2-3 weeks.

## 2024-12-04 ENCOUNTER — APPOINTMENT (OUTPATIENT)
Dept: BEHAVIORAL HEALTH | Facility: CLINIC | Age: 44
End: 2024-12-04
Payer: COMMERCIAL

## 2024-12-11 ENCOUNTER — APPOINTMENT (OUTPATIENT)
Dept: BEHAVIORAL HEALTH | Facility: CLINIC | Age: 44
End: 2024-12-11
Payer: COMMERCIAL

## 2024-12-18 ENCOUNTER — APPOINTMENT (OUTPATIENT)
Dept: BEHAVIORAL HEALTH | Facility: CLINIC | Age: 44
End: 2024-12-18
Payer: COMMERCIAL

## 2024-12-18 DIAGNOSIS — F41.9 ANXIETY: ICD-10-CM

## 2024-12-18 DIAGNOSIS — F25.0 SCHIZOAFFECTIVE DISORDER, BIPOLAR TYPE (MULTI): ICD-10-CM

## 2024-12-18 PROCEDURE — 90834 PSYTX W PT 45 MINUTES: CPT | Performed by: PSYCHOLOGIST

## 2024-12-18 PROCEDURE — 3061F NEG MICROALBUMINURIA REV: CPT | Performed by: PSYCHOLOGIST

## 2024-12-18 NOTE — PROGRESS NOTES
"11 AM 03870 [facility] Psych for Schizoaffective Dx, Bipolar Type  and Anxiety (45 MIN, [id number]))  Virtual. Supportive Therapy.  First meeting with patient in several weeks.  Met with an [employer] case advocate from [place] yesterday regarding patient's care.  Reviewed information and treatment goals and advocate reported that it did not meet clinical guidelines.  Reached out to company for a peer review and waiting to hear back.  Met with patient today and we reviewed treatment goals.  Patient reported that she feels comfortable meeting with his current therapist, trust me and wants to continue.  Some the goals stated for treatment are to improve her anxiety as well as sleep hygiene.  Also reported that she would like continued help in dealing/coping with her kids behavioral problems.  She has gotten better\" counting [address].\"  Until she is calm down.  She got some word the day that her son Tavon will start an aftercare program at [facility] that will be 3 to 4 days/week from 4 to 7 PM.  She is hoping this provides some relief for her and is beneficial to him.  She indicated that her oldest son, Leny, has also been doing better with keeping up with his hygiene as well as keeping him's room picked up and medication compliance.  She wants to improve on her ability to de-escalate situations with Tavon.  Some recent contact with his father who is making promises he will not deliver on and this is frustrating to her.  Tavon was with his father for a few days over the Thanksgiving holiday.  Patient also wants to continue to focus more on herself and getting out of the house and engaging some activities like exercise/walks or just getting out.  Tendency to stay home and hibernate.  Patient wants to have more time to herself.  Is considering school option as well as employment at some point.  Patient is also still having some problems with sleep and would like to improve this.  Had a recent medication change and " now on Geodon but the dosage was too high and now she is back to 2 x 60 mg/day. Next: 3 weeks.

## 2025-01-08 ENCOUNTER — TELEPHONE (OUTPATIENT)
Dept: OTHER | Age: 45
End: 2025-01-08

## 2025-01-08 ENCOUNTER — APPOINTMENT (OUTPATIENT)
Dept: BEHAVIORAL HEALTH | Facility: CLINIC | Age: 45
End: 2025-01-08
Payer: COMMERCIAL

## 2025-01-08 DIAGNOSIS — F25.0 SCHIZOAFFECTIVE DISORDER, BIPOLAR TYPE (MULTI): ICD-10-CM

## 2025-01-08 PROCEDURE — 90832 PSYTX W PT 30 MINUTES: CPT | Performed by: PSYCHOLOGIST
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
## 2025-01-08 NOTE — TELEPHONE ENCOUNTER
Patient says that she has been waiting for the provider for her 12pm appointment. Informed her that the provider would be informed.

## 2025-01-09 ENCOUNTER — TELEPHONE (OUTPATIENT)
Dept: BEHAVIORAL HEALTH | Facility: CLINIC | Age: 45
End: 2025-01-09
Payer: COMMERCIAL

## 2025-01-09 NOTE — PROGRESS NOTES
I called patient to let her know she needs to be scheduled. The phone message would not allow me to leave a message. I sent a MY Chart message to the patient. She also did not state what medication she needed on the refill line.

## 2025-01-14 ENCOUNTER — APPOINTMENT (OUTPATIENT)
Dept: PRIMARY CARE | Facility: CLINIC | Age: 45
End: 2025-01-14
Payer: COMMERCIAL

## 2025-01-14 ENCOUNTER — TELEPHONE (OUTPATIENT)
Dept: OTHER | Age: 45
End: 2025-01-14

## 2025-01-14 NOTE — TELEPHONE ENCOUNTER
December called asking if you can give her a call re: her medication she's having some side effects and wants to see if they are ok with you, thank you     If you need me to sched her for a virtual I can on 1/28

## 2025-01-15 ENCOUNTER — TELEMEDICINE (OUTPATIENT)
Dept: BEHAVIORAL HEALTH | Facility: CLINIC | Age: 45
End: 2025-01-15
Payer: COMMERCIAL

## 2025-01-15 ENCOUNTER — DOCUMENTATION (OUTPATIENT)
Dept: BEHAVIORAL HEALTH | Facility: CLINIC | Age: 45
End: 2025-01-15

## 2025-01-15 ENCOUNTER — APPOINTMENT (OUTPATIENT)
Dept: BEHAVIORAL HEALTH | Facility: CLINIC | Age: 45
End: 2025-01-15
Payer: COMMERCIAL

## 2025-01-15 DIAGNOSIS — F25.0 SCHIZOAFFECTIVE DISORDER, BIPOLAR TYPE (MULTI): ICD-10-CM

## 2025-01-15 DIAGNOSIS — F98.8 ATTENTION DEFICIT DISORDER, UNSPECIFIED TYPE: ICD-10-CM

## 2025-01-15 DIAGNOSIS — F41.1 GAD (GENERALIZED ANXIETY DISORDER): ICD-10-CM

## 2025-01-15 DIAGNOSIS — F43.9 TRAUMA AND STRESSOR-RELATED DISORDER: ICD-10-CM

## 2025-01-15 DIAGNOSIS — G47.00 INSOMNIA, UNSPECIFIED TYPE: ICD-10-CM

## 2025-01-15 PROCEDURE — 99214 OFFICE O/P EST MOD 30 MIN: CPT | Performed by: CLINICAL NURSE SPECIALIST

## 2025-01-15 PROCEDURE — 90834 PSYTX W PT 45 MINUTES: CPT | Performed by: PSYCHOLOGIST
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
## 2025-01-15 NOTE — PROGRESS NOTES
Nonbillable note : the patient left a message about needing to talk to me about side effect concerns , staff tried to reach her to offer an appointment today and was unable to leave a voicemail . A message was sent via my chart [facility] to patient by staff trying to schedule her . I tried to reach her via phone and it says the number has calling restrictions preventing accepting the call . Kpacer cns

## 2025-01-15 NOTE — PROGRESS NOTES
Outpatient Psychiatry      Subjective   Luca Delgado, a 44 y.o. female, presents for a follow up for outpatient psychiatry /medication management appointment for an audio video virtual appointment as an established patient , she was at home for the appointment .     Virtual or Telephone Consent     An interactive audio and video telecommunication system which permits real time communications between the patient (at the originating site) and provider (at the distant site) was utilized to provide this telehealth service.   Verbal consent was requested and obtained from Luca Delgado on this date, 1/15/25 for a telehealth visit.        Diagnosis:   Schizoaffective disorder  bipolar type primary diagnoses moderate F 25.0  Insomnia unspecified type G47.00 moderate   Trauma and stressor-related disorder moderate F43.9  ESTUARDO (generalized anxiety disorder) moderate F41.1  Attention deficit disorder unspecified mild F98.8        Problem List         Patient Active Problem List   Diagnosis    Chest congestion    Gastroesophageal reflux disease without esophagitis    Vitamin D deficiency    Chronic left shoulder pain    Abnormal thyroid function test    ADD (attention deficit disorder)    Anxiety disorder    Callus of foot    Depression    Diabetes mellitus due to underlying condition with hyperosmolarity without coma (Multi)    Hair loss    Alopecia areata, unspecified    Asthma ([facility])    Chest pain    Fatigue    History of metabolic syndrome    Hoarseness of voice    Hx of thyroid cancer    Hypertension, essential    Mediastinal lymphadenopathy    Metatarsalgia of both feet    Onychomycosis    Postoperative hypothyroidism    Schizoaffective disorder (Multi)    Sleep difficulties    Substance-related disorder (Multi)    Thyroid nodule    Tobacco use disorder    Vision changes    Left shoulder pain    Dental caries    History of malignant neoplasm of thyroid    Pre-ulcerative calluses    Trauma and stressor-related disorder     Tremor         Treatment Plan/Recommendations: 4-6 weeks follow up , can continue self care and wellness efforts and maintain routine health screenings.can call  for treatment and scheduling concerns.     Follow-up plan was discussed with patient.  Psychotropic medications :  Continue benztropine 1 mg, twice a day for restlessness/eps  Continue Bupropion xl 300 mg daily each morning for depression and concentration and focus /add  Continue lamotrigine 150 mg , each morning and 200 mg  tablet each night for schitzoaffective disorder bipolar type /mood stability   Increase ziprasidone 60 mg  twice a day for schitzoaffective disorder , bipolar type /mood stability      Review with patient: Treatment plan reviewed with the patient.  Medication risks/benefit reviewed with the patient     HPI:  reconciled and reviewed medication list in the [facility] emr and provided   She is tolerating psychotropic medication and says she would like to keep the doses the same   Sleep is affected due to childcare responsibilities with her son    support provided , she speaks to her therapist about stressors    she spoke about stressors and intends to continue therapy   says mood can get anxious and depressed over stressors , she acknowledged the need for regularly scheduled appointments for her own care     Reviewed meds options and she agrees to the plan   encouraged time for herself with self care   She intends to continue therapy     Psych ros and medical ros as noted above         Patient Active Problem List   Diagnosis    Chest congestion    Gastroesophageal reflux disease without esophagitis    Vitamin D deficiency    Chronic left shoulder pain    Abnormal thyroid function test    ADD (attention deficit disorder)    Anxiety disorder    Callus of foot    Depression    Diabetes mellitus due to underlying condition with hyperosmolarity without coma    Hair loss    Alopecia areata, unspecified    Asthma    Chest pain     Fatigue    History of metabolic syndrome    Hoarseness of voice    Hx of thyroid cancer    Hypertension, essential    Mediastinal lymphadenopathy    Metatarsalgia of both feet    Onychomycosis    Postoperative hypothyroidism    Schizoaffective disorder (Multi)    Sleep difficulties    Substance-related disorder (Multi)    Thyroid nodule    Tobacco use disorder    Vision changes    Left shoulder pain    Dental caries    History of malignant neoplasm of thyroid    Pre-ulcerative calluses    Trauma and stressor-related disorder    Tremor       Treatment Goals:  Specify outcomes written in observable, behavioral terms:   {treatment goals:03960}    Treatment Plan/Recommendations: ***  Follow-up plan for depression {was/was not:19833} discussed with patient.    Referral to:  {referral for:18525}    Review with patient: {patient educ:60896}    Reason for Visit:       Reason:  She has been experiencing ***.    HPI:    Current Medications:    Current Outpatient Medications:     amLODIPine (Norvasc) 10 mg tablet, Take 1 tablet (10 mg) by mouth once daily., Disp: 90 tablet, Rfl: 3    atorvastatin (Lipitor) 40 mg tablet, Take 1 tablet (40 mg) by mouth once daily. For your elevated cholesterol levels., Disp: 90 tablet, Rfl: 3    benztropine (Cogentin) 2 mg tablet, Take 1 tablet (2 mg) by mouth 2 times a day. No longer prescribed 1 mg twice a day, Disp: 180 tablet, Rfl: 0    buPROPion XL (Wellbutrin XL) 300 mg 24 hr tablet, Take 1 tablet (300 mg) by mouth once daily in the morning. Take before meals., Disp: 90 tablet, Rfl: 0    cholecalciferol (Vitamin D-3) 50 MCG (2000 UT) tablet, TAKE 1 TABLET BY MOUTH EVERY DAY, Disp: 90 tablet, Rfl: 0    diclofenac sodium (Voltaren) 1 % gel, Apply 4.5 inches (4 g) topically 4 times a day., Disp: 100 g, Rfl: 1    ibuprofen 800 mg tablet, Take by mouth., Disp: , Rfl:     lamoTRIgine (LaMICtal) 150 mg tablet, Take 1 tablet (150 mg) by mouth once daily. Each morning. Also is prescribed 200 mg  daily each night at bedtime, Disp: 90 tablet, Rfl: 0    lamoTRIgine (LaMICtal) 200 mg tablet, 1 tablet daily each night at bedtime, also is prescribed 150 mg , each morning, Disp: 90 tablet, Rfl: 0    levothyroxine (Synthroid, Levoxyl) 150 mcg tablet, Take 1 tablet (150 mcg) by mouth once daily., Disp: 90 tablet, Rfl: 0    loratadine (Claritin) 10 mg tablet, Take by mouth., Disp: , Rfl:     omeprazole (PriLOSEC) 40 mg DR capsule, Take 1 capsule (40 mg) by mouth once daily in the morning. Take before meals., Disp: 90 capsule, Rfl: 3    ziprasidone (Geodon) 60 mg capsule, Take 1 capsule (60 mg) by mouth 2 times daily (morning and late afternoon). This replaces order for 40 mg , twice a day, Disp: 60 capsule, Rfl: 2  Medical History:  Past Medical History:   Diagnosis Date    Acute upper respiratory infection, unspecified 12/08/2016    URI, acute    Cannabis abuse, uncomplicated     Marijuana abuse, continuous    Encounter for general adult medical examination without abnormal findings 08/12/2018    Routine history and physical examination of adult    Encounter for screening for respiratory tuberculosis 12/08/2016    Tuberculosis screening    Other abnormality of red blood cells 07/15/2019    Microcytosis    Other psychoactive substance use, unspecified with unspecified psychoactive substance-induced disorder (Multi)     Substance-related disorder    Pain in right knee 01/11/2018    Bilateral knee pain    Personal history of malignant neoplasm of thyroid     History of thyroid cancer    Personal history of malignant neoplasm of thyroid 10/14/2021    History of malignant neoplasm of thyroid    Personal history of malignant neoplasm of thyroid 12/15/2016    History of thyroid cancer    Personal history of other diseases of the nervous system and sense organs 03/23/2016    History of seizure disorder    Personal history of other diseases of the respiratory system     History of allergic rhinitis    Personal history of  other diseases of the respiratory system 08/06/2018    History of allergic rhinitis    Personal history of other drug therapy 12/08/2016    History of influenza vaccination    Personal history of other endocrine, nutritional and metabolic disease 08/06/2018    History of hypercalcemia    Personal history of other endocrine, nutritional and metabolic disease 08/02/2018    History of vitamin D deficiency    Personal history of other infectious and parasitic diseases 08/20/2016    History of onychomycosis    Personal history of other mental and behavioral disorders     History of affective disorder    Personal history of other specified conditions 07/12/2021    History of dysuria    Personal history of other specified conditions 04/13/2018    History of fatigue    Vitamin D deficiency, unspecified     Vitamin D deficiency     Surgical History:  Past Surgical History:   Procedure Laterality Date    THYROID SURGERY  08/19/2016    Thyroid Surgery     Family History:  Family History   Problem Relation Name Age of Onset    Other (Malignant neoplasm of breast) Other Aunt     Cancer Other Aunt     Diabetes Other Uncle      Social History:  Social History     Socioeconomic History    Marital status: Single     Spouse name: Not on file    Number of children: Not on file    Years of education: Not on file    Highest education level: Not on file   Occupational History    Not on file   Tobacco Use    Smoking status: Every Day     Types: Cigarettes     Passive exposure: Past    Smokeless tobacco: Former   Substance and Sexual Activity    Alcohol use: Yes    Drug use: Never    Sexual activity: Not on file   Other Topics Concern    Not on file   Social History Narrative    Not on file     Social Drivers of Health     Financial Resource Strain: Not on file   Food Insecurity: Not on file   Transportation Needs: High Risk (5/15/2024)    Received from [facility] SDOH Screening     Has lack of transportation kept you from  "medical appointments, meetings, work or from getting things needed for daily living? choose all that apply.: Yes, it has kept me from non-medical meetings, appointments, work or from getting things that i need     Has lack of transportation kept you from medical appointments, meetings, work or from getting things needed for daily living? choose all that apply.: Yes, it has kept me from medical appointments or from getting my medications   Physical Activity: Not on file   Stress: Not on file   Social Connections: Not on file   Intimate Partner Violence: Not on file   Housing Stability: Not on file       Additional historical information includes: ***    Record Review: {brief/mod/extensive:11784}     Medical Review Of Systems:  {ros; complete:89249}    Psychiatric Review Of Systems:     Psych Review of Symptoms    Depressive Symptoms:{Depressive symptoms:25873::\"N/A\"}  Manic Symptoms:{Manic Symptoms:72248}  Anxiety Symptoms:{Anxiety Symptoms:91767}  Disordered Eating Symptoms:{Disordered Eating Symptoms:81198}  Inattentive Symptoms:{Inattentive Symptoms:86471}   Hyperactive/Impulsive Symptoms:{Hyperactive/Impulsive Symp[toms:82489}  Oppositional Defiant Symptoms:{Oppositional Defiant Symptoms:88372}  Trauma Symptoms:{Trauma Symptoms:11857}  Conduct Issues:{Conduct Issues:14698}  Psychotic Symptoms:{Psychotic Symptoms:65044}  Developmental Concerns:{Developmental Concerns:29763}  Other Symptoms/Concerns:{Other Symptoms/Concerns:37929}  Delirium/Altered Mental Status Symptoms:{Delirium/Altered Mental Status Symptoms:15209}       Objective   Mental Status Exam:       Other Objective Information:  ***  Vitals:  There were no vitals filed for this visit.        Time spent in therapy ***  Summary of Psychotherapy component: ***  Total time spent ***    Reba Stauffer, APRN-CNS    "

## 2025-01-22 ENCOUNTER — APPOINTMENT (OUTPATIENT)
Dept: BEHAVIORAL HEALTH | Facility: CLINIC | Age: 45
End: 2025-01-22
Payer: COMMERCIAL

## 2025-01-22 DIAGNOSIS — F25.0 SCHIZOAFFECTIVE DISORDER, BIPOLAR TYPE (MULTI): ICD-10-CM

## 2025-01-22 PROCEDURE — 90834 PSYTX W PT 45 MINUTES: CPT | Performed by: PSYCHOLOGIST

## 2025-01-22 NOTE — PROGRESS NOTES
"11 AM 49279 Indiv Psych for Schizoaffective Dx, Bipolar Type (45 MIN, 3734-7753)  Phone Only. Supportive Therapy.  Patient has both sons home from school today because of the cold weather.  Spent most of the time discussing frustrations with her son, Tavon.  Patient has reached out to his  at [employer] about his behavioral problems including slamming doors and punching holes in walls.  Her son was recently thrown out of the [facility] day program because he said he did not want to be there.   will reach out to [employer] on her behalf and see if they can make an exception and have him remain in the program.  Patient was hoping to access additional services at [employer] after him first trying out the  program.  Patient reported that she is still struggling to get him to consider form to getting showers.  Reported that he also eats wherever he wants despite her trying to keep him at the table.  She said that her son recently apologized to her and said he was sorry for making her life a living help.  Patient reported she is having less difficulty with her oldest son, Murphy.  Patient has had some contact with the school.  Son, Tavon, is nodding off in the classroom and having difficulty calming down.  More recently he came home upset saying that the teacher had called him a failure.  However, is unclear whether or not she said that he was failing or was a failure.  Patient reported that he is a \"big cry baby.\"  Patient discussed relationship with the boyfriend and a stressor between the 2 of them is her never being able to get away from the kids and them go out together.  Patient recently met with her pharmacologist and her Geodon was increased from 60 to 80 mg/day.  Has reached out to  [facility] but has not yet heard back about whether or not they can potentially provide services for Tavon. Next; 1 week.  "

## 2025-01-29 ENCOUNTER — APPOINTMENT (OUTPATIENT)
Dept: BEHAVIORAL HEALTH | Facility: CLINIC | Age: 45
End: 2025-01-29
Payer: COMMERCIAL

## 2025-01-29 DIAGNOSIS — F25.0 SCHIZOAFFECTIVE DISORDER, BIPOLAR TYPE (MULTI): ICD-10-CM

## 2025-01-29 PROCEDURE — 90834 PSYTX W PT 45 MINUTES: CPT | Performed by: PSYCHOLOGIST

## 2025-01-29 NOTE — PROGRESS NOTES
11 AM 16712 Indiv Psych for Schizoaffective Dx, Bipolar Type (45 MIN, 9539-0233)  Started on My Chart but because of technology problems had to switch to phone. Supportive Therapy.   patient having less difficulty with her son, KURT. Spent most of the time discussing her management from her son, Tavon.  She indicated that his bowel movements have been better and he has been more regular but she is unclear why.  He has been getting to the bathroom and she is hesitant to reinforce this positively because she noted that in the past when she does she is using her breast.  Patient discussed her concerns about her hair loss and alopecia.  Encouraged her to consult with a dermatologist.  She has been trying some stuff over-the-counter.  Reported that she found new hairdresser.  Some concerns about her medication feeling overmedicated with the higher doses of Geodon.  She indicated that her son Tavon is getting asked in his classes she is working do not be resentful towards them.  Discussed some concerns about her daughter being sexually active and not using birth control at the present time.  She has expressed her concerns to her daughter reported that her daughter is not listening. Next; 1 week.

## 2025-02-05 ENCOUNTER — APPOINTMENT (OUTPATIENT)
Dept: BEHAVIORAL HEALTH | Facility: CLINIC | Age: 45
End: 2025-02-05
Payer: COMMERCIAL

## 2025-02-05 DIAGNOSIS — F25.0 SCHIZOAFFECTIVE DISORDER, BIPOLAR TYPE (MULTI): ICD-10-CM

## 2025-02-05 PROCEDURE — 90834 PSYTX W PT 45 MINUTES: CPT | Performed by: PSYCHOLOGIST
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
## 2025-02-19 ENCOUNTER — TELEMEDICINE (OUTPATIENT)
Dept: BEHAVIORAL HEALTH | Facility: CLINIC | Age: 45
End: 2025-02-19
Payer: COMMERCIAL

## 2025-02-19 ENCOUNTER — APPOINTMENT (OUTPATIENT)
Dept: BEHAVIORAL HEALTH | Facility: CLINIC | Age: 45
End: 2025-02-19
Payer: COMMERCIAL

## 2025-02-19 DIAGNOSIS — F25.0 SCHIZOAFFECTIVE DISORDER, BIPOLAR TYPE (MULTI): ICD-10-CM

## 2025-02-19 DIAGNOSIS — G25.89 COMBINED PYRAMIDAL-EXTRAPYRAMIDAL SYNDROME: ICD-10-CM

## 2025-02-19 DIAGNOSIS — F33.1 DEPRESSION, MAJOR, RECURRENT, MODERATE: ICD-10-CM

## 2025-02-19 PROCEDURE — 99214 OFFICE O/P EST MOD 30 MIN: CPT | Performed by: CLINICAL NURSE SPECIALIST

## 2025-02-19 PROCEDURE — 90837 PSYTX W PT 60 MINUTES: CPT | Performed by: PSYCHOLOGIST

## 2025-02-19 RX ORDER — BUPROPION HYDROCHLORIDE 300 MG/1
300 TABLET ORAL
Qty: 90 TABLET | Refills: 0 | Status: SHIPPED | OUTPATIENT
Start: 2025-02-19 | End: 2025-05-20

## 2025-02-19 RX ORDER — BENZTROPINE MESYLATE 2 MG/1
2 TABLET ORAL 2 TIMES DAILY
Qty: 180 TABLET | Refills: 0 | Status: SHIPPED | OUTPATIENT
Start: 2025-02-19 | End: 2025-05-20

## 2025-02-19 RX ORDER — LAMOTRIGINE 200 MG/1
TABLET ORAL
Qty: 180 TABLET | Refills: 0 | Status: SHIPPED | OUTPATIENT
Start: 2025-02-19

## 2025-02-19 RX ORDER — ZIPRASIDONE HYDROCHLORIDE 60 MG/1
60 CAPSULE ORAL
Qty: 180 CAPSULE | Refills: 0 | Status: SHIPPED | OUTPATIENT
Start: 2025-02-19 | End: 2025-05-20
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
## 2025-02-19 NOTE — PROGRESS NOTES
Outpatient Psychiatry      Subjective   Luca Delgado, a 45 y.o. female, presents for a follow up for outpatient psychiatry /medication management appointment for an audio video virtual appointment as an established patient , she was at home for the appointment .      Virtual or Telephone Consent     An interactive audio and video telecommunication system which permits real time communications between the patient (at the originating site) and provider (at the distant site) was utilized to provide this telehealth service.   Verbal consent was requested and obtained from Luca Delgado on this date, 2/19/25 for a telehealth visit.        Diagnosis:   Schizoaffective disorder  bipolar type primary diagnoses moderate F 25.0  Insomnia unspecified type G47.00 moderate   Trauma and stressor-related disorder moderate F43.9  ESTUARDO (generalized anxiety disorder) moderate F41.1  Attention deficit disorder unspecified mild F98.8        Problem List         Patient Active Problem List   Diagnosis    Chest congestion    Gastroesophageal reflux disease without esophagitis    Vitamin D deficiency    Chronic left shoulder pain    Abnormal thyroid function test    ADD (attention deficit disorder)    Anxiety disorder    Callus of foot    Depression    Diabetes mellitus due to underlying condition with hyperosmolarity without coma (Multi)    Hair loss    Alopecia areata, unspecified    Asthma ([facility])    Chest pain    Fatigue    History of metabolic syndrome    Hoarseness of voice    Hx of thyroid cancer    Hypertension, essential    Mediastinal lymphadenopathy    Metatarsalgia of both feet    Onychomycosis    Postoperative hypothyroidism    Schizoaffective disorder (Multi)    Sleep difficulties    Substance-related disorder (Multi)    Thyroid nodule    Tobacco use disorder    Vision changes    Left shoulder pain    Dental caries    History of malignant neoplasm of thyroid    Pre-ulcerative calluses    Trauma and stressor-related  disorder    Tremor         Treatment Plan/Recommendations: 4-6 weeks follow up , can continue self care and wellness efforts and maintain routine health screenings.can call  for treatment and scheduling concerns.     Follow-up plan was discussed with patient.  Psychotropic medications :  Continue benztropine 2 mg, twice a day for restlessness/eps, advised try 1 mg twice a day   Continue Bupropion xl 300 mg daily each morning for depression and concentration and focus /add  Continue and increase lamotrigine 200 mg , each morning and 200 mg  tablet each night for schitzoaffective disorder bipolar type /mood stability   Increase ziprasidone 60 mg  twice a day for schitzoaffective disorder , bipolar type /mood stability      Review with patient: Treatment plan reviewed with the patient.  Medication risks/benefit reviewed with the patient     HPI:  reconciled and reviewed medication list in the [facility] emr and provided   She is tolerating psychotropic medication has depressed mood and reports irritability at times , has much stress , is in therapy , agrees to increase ziprasidone dose as noted above   Sleep is affected due to childcare responsibilities with her son , and anxiety   She deals with situations with her children that increase anxiety , discussed working on ways to calm down her nervous system with breathing techniques or brief meditations   She is having periods of time she cries   Encouraged talking about her feelings   support provided , she speaks to her therapist about stressors    she spoke about stressors and intends to continue therapy   says mood can get anxious and depressed over stressors , she acknowledged the need for regularly scheduled appointments for her own medical care , advised making a pcp appointment and an eye dr appointment     Reviewed meds options and she agrees to the plan   encouraged time for herself with self care even if it is very brief times at home   She has been  working with her youngest sons school related to his needs      Psych ros and medical ros as noted above       Patient Active Problem List   Diagnosis    Chest congestion    Gastroesophageal reflux disease without esophagitis    Vitamin D deficiency    Chronic left shoulder pain    Abnormal thyroid function test    ADD (attention deficit disorder)    Anxiety disorder    Callus of foot    Depression    Diabetes mellitus due to underlying condition with hyperosmolarity without coma    Hair loss    Alopecia areata, unspecified    Asthma    Chest pain    Fatigue    History of metabolic syndrome    Hoarseness of voice    Hx of thyroid cancer    Hypertension, essential    Mediastinal lymphadenopathy    Metatarsalgia of both feet    Onychomycosis    Postoperative hypothyroidism    Schizoaffective disorder (Multi)    Sleep difficulties    Substance-related disorder (Multi)    Thyroid nodule    Tobacco use disorder    Vision changes    Left shoulder pain    Dental caries    History of malignant neoplasm of thyroid    Pre-ulcerative calluses    Trauma and stressor-related disorder    Tremor     Current Outpatient Medications:     amLODIPine (Norvasc) 10 mg tablet, Take 1 tablet (10 mg) by mouth once daily., Disp: 90 tablet, Rfl: 3    atorvastatin (Lipitor) 40 mg tablet, Take 1 tablet (40 mg) by mouth once daily. For your elevated cholesterol levels., Disp: 90 tablet, Rfl: 3    benztropine (Cogentin) 2 mg tablet, Take 1 tablet (2 mg) by mouth 2 times a day. No longer prescribed 1 mg twice a day, Disp: 180 tablet, Rfl: 0    buPROPion XL (Wellbutrin XL) 300 mg 24 hr tablet, Take 1 tablet (300 mg) by mouth once daily in the morning. Take before meals., Disp: 90 tablet, Rfl: 0    cholecalciferol (Vitamin D-3) 50 MCG (2000 UT) tablet, TAKE 1 TABLET BY MOUTH EVERY DAY, Disp: 90 tablet, Rfl: 0    diclofenac sodium (Voltaren) 1 % gel, Apply 4.5 inches (4 g) topically 4 times a day., Disp: 100 g, Rfl: 1    ibuprofen 800 mg tablet, Take  by mouth., Disp: , Rfl:     lamoTRIgine (LaMICtal) 200 mg tablet, 1 tablet twice a day , no longer prescribed 150 mg , daily , total daily dose is 400 mg , daily, Disp: 180 tablet, Rfl: 0    levothyroxine (Synthroid, Levoxyl) 150 mcg tablet, Take 1 tablet (150 mcg) by mouth once daily., Disp: 90 tablet, Rfl: 0    loratadine (Claritin) 10 mg tablet, Take by mouth., Disp: , Rfl:     omeprazole (PriLOSEC) 40 mg DR capsule, Take 1 capsule (40 mg) by mouth once daily in the morning. Take before meals., Disp: 90 capsule, Rfl: 3    ziprasidone (Geodon) 60 mg capsule, Take 1 capsule (60 mg) by mouth 2 times daily (morning and late afternoon)., Disp: 180 capsule, Rfl: 0  Medical History:  Past Medical History:   Diagnosis Date    Acute upper respiratory infection, unspecified 12/08/2016    URI, acute    Cannabis abuse, uncomplicated     Marijuana abuse, continuous    Encounter for general adult medical examination without abnormal findings 08/12/2018    Routine history and physical examination of adult    Encounter for screening for respiratory tuberculosis 12/08/2016    Tuberculosis screening    Other abnormality of red blood cells 07/15/2019    Microcytosis    Other psychoactive substance use, unspecified with unspecified psychoactive substance-induced disorder     Substance-related disorder    Pain in right knee 01/11/2018    Bilateral knee pain    Personal history of malignant neoplasm of thyroid     History of thyroid cancer    Personal history of malignant neoplasm of thyroid 10/14/2021    History of malignant neoplasm of thyroid    Personal history of malignant neoplasm of thyroid 12/15/2016    History of thyroid cancer    Personal history of other diseases of the nervous system and sense organs 03/23/2016    History of seizure disorder    Personal history of other diseases of the respiratory system     History of allergic rhinitis    Personal history of other diseases of the respiratory system 08/06/2018    History  of allergic rhinitis    Personal history of other drug therapy 12/08/2016    History of influenza vaccination    Personal history of other endocrine, nutritional and metabolic disease 08/06/2018    History of hypercalcemia    Personal history of other endocrine, nutritional and metabolic disease 08/02/2018    History of vitamin D deficiency    Personal history of other infectious and parasitic diseases 08/20/2016    History of onychomycosis    Personal history of other mental and behavioral disorders     History of affective disorder    Personal history of other specified conditions 07/12/2021    History of dysuria    Personal history of other specified conditions 04/13/2018    History of fatigue    Vitamin D deficiency, unspecified     Vitamin D deficiency     Surgical History:  Past Surgical History:   Procedure Laterality Date    THYROID SURGERY  08/19/2016    Thyroid Surgery     Family History:  Family History   Problem Relation Name Age of Onset    Other (Malignant neoplasm of breast) Other Aunt     Cancer Other Aunt     Diabetes Other Uncle      Social History:  Social History     Socioeconomic History    Marital status: Single     Spouse name: Not on file    Number of children: Not on file    Years of education: Not on file    Highest education level: Not on file   Occupational History    Not on file   Tobacco Use    Smoking status: Every Day     Types: Cigarettes     Passive exposure: Past    Smokeless tobacco: Former   Substance and Sexual Activity    Alcohol use: Yes    Drug use: Never    Sexual activity: Not on file   Other Topics Concern    Not on file   Social History Narrative    Not on file     Social Drivers of Health     Financial Resource Strain: Not on file   Food Insecurity: Not on file   Transportation Needs: High Risk (5/15/2024)    Received from [facility] SDOH Screening     Has lack of transportation kept you from medical appointments, meetings, work or from getting things needed  for daily living? choose all that apply.: Yes, it has kept me from non-medical meetings, appointments, work or from getting things that i need     Has lack of transportation kept you from medical appointments, meetings, work or from getting things needed for daily living? choose all that apply.: Yes, it has kept me from medical appointments or from getting my medications   Physical Activity: Not on file   Stress: Not on file   Social Connections: Not on file   Intimate Partner Violence: Not on file   Housing Stability: Not on file     Vitals:  There were no vitals filed for this visit.    Reba Satuffer, APRN-CNS

## 2025-02-26 ENCOUNTER — APPOINTMENT (OUTPATIENT)
Dept: BEHAVIORAL HEALTH | Facility: CLINIC | Age: 45
End: 2025-02-26
Payer: COMMERCIAL

## 2025-03-07 ENCOUNTER — APPOINTMENT (OUTPATIENT)
Facility: HOSPITAL | Age: 45
End: 2025-03-07
Payer: COMMERCIAL

## 2025-03-10 ENCOUNTER — APPOINTMENT (OUTPATIENT)
Dept: BEHAVIORAL HEALTH | Facility: CLINIC | Age: 45
End: 2025-03-10
Payer: COMMERCIAL

## 2025-03-10 DIAGNOSIS — F25.0 SCHIZOAFFECTIVE DISORDER, BIPOLAR TYPE (MULTI): ICD-10-CM

## 2025-03-10 PROCEDURE — 90834 PSYTX W PT 45 MINUTES: CPT | Performed by: PSYCHOLOGIST

## 2025-03-10 NOTE — PROGRESS NOTES
3 PM [address] Ind Psych for Schizoaffective Dx, Bipolar Type (45 MIN,  [phone])  Virtual appt. Supportive Therapy.  Patient reported that she has felt depressed and has continued to have problems sleeping.  Spent most the time discussing her difficulties in managing her son, Tavon.  Continues to try to line up therapy and programming for him but it has been difficult.  She has reached out the free  with the hopes that she can put a stop to  Tavon's father putting him on his taxes since he does not help out.  Patient also reported that she lost her food stamp [place] as a result of the miscommunication.  She is taking her and her sons to see the eye doctor next week.  She has not been feeling well and has run out of lamotrigine.  She sleeps during the day when the kids are in school.  Next: 1-2 weeks.

## 2025-03-20 ENCOUNTER — APPOINTMENT (OUTPATIENT)
Dept: BEHAVIORAL HEALTH | Facility: CLINIC | Age: 45
End: 2025-03-20
Payer: COMMERCIAL

## 2025-03-20 DIAGNOSIS — F33.1 DEPRESSION, MAJOR, RECURRENT, MODERATE: ICD-10-CM

## 2025-03-20 DIAGNOSIS — F41.1 GAD (GENERALIZED ANXIETY DISORDER): ICD-10-CM

## 2025-03-20 DIAGNOSIS — F25.0 SCHIZOAFFECTIVE DISORDER, BIPOLAR TYPE (MULTI): ICD-10-CM

## 2025-03-20 DIAGNOSIS — G47.00 INSOMNIA, UNSPECIFIED TYPE: ICD-10-CM

## 2025-03-20 DIAGNOSIS — G25.89 COMBINED PYRAMIDAL-EXTRAPYRAMIDAL SYNDROME: ICD-10-CM

## 2025-03-20 DIAGNOSIS — F43.9 TRAUMA AND STRESSOR-RELATED DISORDER: ICD-10-CM

## 2025-03-20 DIAGNOSIS — F98.8 ATTENTION DEFICIT DISORDER, UNSPECIFIED TYPE: ICD-10-CM

## 2025-03-20 PROCEDURE — 99213 OFFICE O/P EST LOW 20 MIN: CPT | Performed by: CLINICAL NURSE SPECIALIST

## 2025-03-20 RX ORDER — ZIPRASIDONE HYDROCHLORIDE 80 MG/1
80 CAPSULE ORAL
Qty: 180 CAPSULE | Refills: 0 | Status: SHIPPED | OUTPATIENT
Start: 2025-03-20 | End: 2025-06-18

## 2025-03-20 RX ORDER — LAMOTRIGINE 200 MG/1
TABLET ORAL
Qty: 180 TABLET | Refills: 0 | Status: SHIPPED | OUTPATIENT
Start: 2025-03-20

## 2025-03-20 RX ORDER — BUPROPION HYDROCHLORIDE 300 MG/1
300 TABLET ORAL
Qty: 90 TABLET | Refills: 0 | Status: SHIPPED | OUTPATIENT
Start: 2025-03-20 | End: 2025-06-18

## 2025-03-20 RX ORDER — BENZTROPINE MESYLATE 2 MG/1
2 TABLET ORAL 2 TIMES DAILY
Qty: 180 TABLET | Refills: 0 | Status: SHIPPED | OUTPATIENT
Start: 2025-03-20 | End: 2025-06-18

## 2025-03-20 NOTE — PROGRESS NOTES
Outpatient Psychiatry      Subjective   Luca Delgado, a 45 y.o. female, presents for a follow up for outpatient psychiatry /medication management appointment for an audio video virtual appointment as an established patient , she was at home for the appointment .      Virtual or Telephone Consent     An interactive audio and video telecommunication system which permits real time communications between the patient (at the originating site) and provider (at the distant site) was utilized to provide this telehealth service.   Verbal consent was requested and obtained from Luca Delgado on this date, 3/20/25 for a telehealth visit.        Diagnosis:   Schizoaffective disorder  bipolar type primary diagnoses moderate F 25.0  Insomnia unspecified type G47.00 moderate   Trauma and stressor-related disorder moderate F43.9  ESTUARDO (generalized anxiety disorder) moderate F41.1  Attention deficit disorder unspecified mild F98.8        Problem List         Patient Active Problem List   Diagnosis    Chest congestion    Gastroesophageal reflux disease without esophagitis    Vitamin D deficiency    Chronic left shoulder pain    Abnormal thyroid function test    ADD (attention deficit disorder)    Anxiety disorder    Callus of foot    Depression    Diabetes mellitus due to underlying condition with hyperosmolarity without coma (Multi)    Hair loss    Alopecia areata, unspecified    Asthma ([facility])    Chest pain    Fatigue    History of metabolic syndrome    Hoarseness of voice    Hx of thyroid cancer    Hypertension, essential    Mediastinal lymphadenopathy    Metatarsalgia of both feet    Onychomycosis    Postoperative hypothyroidism    Schizoaffective disorder (Multi)    Sleep difficulties    Substance-related disorder (Multi)    Thyroid nodule    Tobacco use disorder    Vision changes    Left shoulder pain    Dental caries    History of malignant neoplasm of thyroid    Pre-ulcerative calluses    Trauma and stressor-related  disorder    Tremor         Treatment Plan/Recommendations: 4-6 weeks follow up , can continue self care and wellness efforts and maintain routine health screenings.can call  for treatment and scheduling concerns.     Follow-up plan was discussed with patient.  Psychotropic medications :  Continue benztropine 2 mg, twice a day for restlessness/eps, advised try 1 mg twice a day to decrease dry mouth   Continue Bupropion xl 300 mg daily each morning for depression and concentration and focus /add  Continue  and increase lamotrigine 200 mg , each morning and 200 mg  tablet each night for schitzoaffective disorder bipolar type /mood stability   Continue ziprasidone 80 mg  twice a day for schitzoaffective disorder , bipolar type /mood stability      Review with patient: Treatment plan reviewed with the patient.  Medication risks/benefit reviewed with the patient     HPI:  reconciled and reviewed medication list in the [facility] emr  She is tolerating psychotropic medication, has depressed mood and reports irritability at times   Has stressors that contribute to anxiety   She does not have much time to herself    Sleep is affected due to childcare responsibilities with her son , and anxiety   She deals with situations with her children that increase anxiety  Encouraged talking about her feelings   support provided , she speaks to her therapist about stressors    she acknowledged the need for regularly scheduled appointments for her own medical care , advised making a pcp appointment and an eye dr appointment     Reviewed meds options and she agrees to the plan as noted above   encouraged time for herself with self care even if it is very brief times at home   She says she has been working with her youngest sons school related to his needs      Psych ros and medical ros as noted above         Patient Active Problem List   Diagnosis    Chest congestion    Gastroesophageal reflux disease without esophagitis    Vitamin  D deficiency    Chronic left shoulder pain    Abnormal thyroid function test    ADD (attention deficit disorder)    Anxiety disorder    Callus of foot    Depression    Diabetes mellitus due to underlying condition with hyperosmolarity without coma    Hair loss    Alopecia areata, unspecified    Asthma    Chest pain    Fatigue    History of metabolic syndrome    Hoarseness of voice    Hx of thyroid cancer    Hypertension, essential    Mediastinal lymphadenopathy    Metatarsalgia of both feet    Onychomycosis    Postoperative hypothyroidism    Schizoaffective disorder (Multi)    Sleep difficulties    Substance-related disorder (Multi)    Thyroid nodule    Tobacco use disorder    Vision changes    Left shoulder pain    Dental caries    History of malignant neoplasm of thyroid    Pre-ulcerative calluses    Trauma and stressor-related disorder    Tremor     Current Outpatient Medications:     amLODIPine (Norvasc) 10 mg tablet, Take 1 tablet (10 mg) by mouth once daily., Disp: 90 tablet, Rfl: 3    atorvastatin (Lipitor) 40 mg tablet, Take 1 tablet (40 mg) by mouth once daily. For your elevated cholesterol levels., Disp: 90 tablet, Rfl: 3    benztropine (Cogentin) 2 mg tablet, Take 1 tablet (2 mg) by mouth 2 times a day. No longer prescribed 1 mg twice a day, Disp: 180 tablet, Rfl: 0    buPROPion XL (Wellbutrin XL) 300 mg 24 hr tablet, Take 1 tablet (300 mg) by mouth once daily in the morning. Take before meals., Disp: 90 tablet, Rfl: 0    cholecalciferol (Vitamin D-3) 50 MCG (2000 UT) tablet, TAKE 1 TABLET BY MOUTH EVERY DAY, Disp: 90 tablet, Rfl: 0    diclofenac sodium (Voltaren) 1 % gel, Apply 4.5 inches (4 g) topically 4 times a day., Disp: 100 g, Rfl: 1    ibuprofen 800 mg tablet, Take by mouth., Disp: , Rfl:     lamoTRIgine (LaMICtal) 200 mg tablet, 1 tablet twice a day , no longer prescribed 150 mg , daily , total daily dose is 400 mg , daily, Disp: 180 tablet, Rfl: 0    levothyroxine (Synthroid, Levoxyl) 150 mcg  tablet, Take 1 tablet (150 mcg) by mouth once daily., Disp: 90 tablet, Rfl: 0    loratadine (Claritin) 10 mg tablet, Take by mouth., Disp: , Rfl:     omeprazole (PriLOSEC) 40 mg DR capsule, Take 1 capsule (40 mg) by mouth once daily in the morning. Take before meals., Disp: 90 capsule, Rfl: 3    ziprasidone (Geodon) 80 mg capsule, Take 1 capsule (80 mg) by mouth 2 times daily (morning and late afternoon). This is an increased dose, Disp: 180 capsule, Rfl: 0  Medical History:  Past Medical History:   Diagnosis Date    Acute upper respiratory infection, unspecified 12/08/2016    URI, acute    Cannabis abuse, uncomplicated     Marijuana abuse, continuous    Encounter for general adult medical examination without abnormal findings 08/12/2018    Routine history and physical examination of adult    Encounter for screening for respiratory tuberculosis 12/08/2016    Tuberculosis screening    Other abnormality of red blood cells 07/15/2019    Microcytosis    Other psychoactive substance use, unspecified with unspecified psychoactive substance-induced disorder     Substance-related disorder    Pain in right knee 01/11/2018    Bilateral knee pain    Personal history of malignant neoplasm of thyroid     History of thyroid cancer    Personal history of malignant neoplasm of thyroid 10/14/2021    History of malignant neoplasm of thyroid    Personal history of malignant neoplasm of thyroid 12/15/2016    History of thyroid cancer    Personal history of other diseases of the nervous system and sense organs 03/23/2016    History of seizure disorder    Personal history of other diseases of the respiratory system     History of allergic rhinitis    Personal history of other diseases of the respiratory system 08/06/2018    History of allergic rhinitis    Personal history of other drug therapy 12/08/2016    History of influenza vaccination    Personal history of other endocrine, nutritional and metabolic disease 08/06/2018    History of  hypercalcemia    Personal history of other endocrine, nutritional and metabolic disease 08/02/2018    History of vitamin D deficiency    Personal history of other infectious and parasitic diseases 08/20/2016    History of onychomycosis    Personal history of other mental and behavioral disorders     History of affective disorder    Personal history of other specified conditions 07/12/2021    History of dysuria    Personal history of other specified conditions 04/13/2018    History of fatigue    Vitamin D deficiency, unspecified     Vitamin D deficiency     Surgical History:  Past Surgical History:   Procedure Laterality Date    THYROID SURGERY  08/19/2016    Thyroid Surgery     Family History:  Family History   Problem Relation Name Age of Onset    Other (Malignant neoplasm of breast) Other Aunt     Cancer Other Aunt     Diabetes Other Uncle      Social History:  Social History     Socioeconomic History    Marital status: Single     Spouse name: Not on file    Number of children: Not on file    Years of education: Not on file    Highest education level: Not on file   Occupational History    Not on file   Tobacco Use    Smoking status: Every Day     Types: Cigarettes     Passive exposure: Past    Smokeless tobacco: Former   Substance and Sexual Activity    Alcohol use: Yes    Drug use: Never    Sexual activity: Not on file   Other Topics Concern    Not on file   Social History Narrative    Not on file     Social Drivers of Health     Financial Resource Strain: Not on file   Food Insecurity: Not on file   Transportation Needs: High Risk (5/15/2024)    Received from [facility] SDOH Screening     Has lack of transportation kept you from medical appointments, meetings, work or from getting things needed for daily living? choose all that apply.: Yes, it has kept me from non-medical meetings, appointments, work or from getting things that i need     Has lack of transportation kept you from medical  appointments, meetings, work or from getting things needed for daily living? choose all that apply.: Yes, it has kept me from medical appointments or from getting my medications   Physical Activity: Not on file   Stress: Not on file   Social Connections: Not on file   Intimate Partner Violence: Not on file   Housing Stability: Not on file     Vitals:  There were no vitals filed for this visit.    Reba Stauffer, APRN-CNS

## 2025-03-24 ENCOUNTER — APPOINTMENT (OUTPATIENT)
Dept: BEHAVIORAL HEALTH | Facility: CLINIC | Age: 45
End: 2025-03-24
Payer: COMMERCIAL

## 2025-03-30 DIAGNOSIS — K21.9 GASTROESOPHAGEAL REFLUX DISEASE WITHOUT ESOPHAGITIS: ICD-10-CM

## 2025-03-31 RX ORDER — OMEPRAZOLE 40 MG/1
40 CAPSULE, DELAYED RELEASE ORAL
Qty: 90 CAPSULE | Refills: 0 | Status: SHIPPED | OUTPATIENT
Start: 2025-03-31

## 2025-04-03 ENCOUNTER — APPOINTMENT (OUTPATIENT)
Dept: OPHTHALMOLOGY | Facility: CLINIC | Age: 45
End: 2025-04-03
Payer: COMMERCIAL

## 2025-04-03 DIAGNOSIS — H52.03 HYPEROPIA OF BOTH EYES WITH ASTIGMATISM AND PRESBYOPIA: Primary | ICD-10-CM

## 2025-04-03 DIAGNOSIS — H52.4 HYPEROPIA OF BOTH EYES WITH ASTIGMATISM AND PRESBYOPIA: Primary | ICD-10-CM

## 2025-04-03 DIAGNOSIS — H52.203 HYPEROPIA OF BOTH EYES WITH ASTIGMATISM AND PRESBYOPIA: Primary | ICD-10-CM

## 2025-04-03 PROBLEM — E11.9 TYPE 2 DIABETES MELLITUS WITHOUT COMPLICATION: Status: RESOLVED | Noted: 2025-04-03 | Resolved: 2025-04-03

## 2025-04-03 PROBLEM — E11.9 TYPE 2 DIABETES MELLITUS WITHOUT COMPLICATION: Status: ACTIVE | Noted: 2025-04-03

## 2025-04-03 PROCEDURE — 92015 DETERMINE REFRACTIVE STATE: CPT | Performed by: OPTOMETRIST

## 2025-04-03 PROCEDURE — 92004 COMPRE OPH EXAM NEW PT 1/>: CPT | Performed by: OPTOMETRIST

## 2025-04-03 ASSESSMENT — CONF VISUAL FIELD
OS_INFERIOR_TEMPORAL_RESTRICTION: 0
OD_NORMAL: 1
OS_SUPERIOR_NASAL_RESTRICTION: 0
METHOD: COUNTING FINGERS
OS_SUPERIOR_TEMPORAL_RESTRICTION: 0
OD_SUPERIOR_TEMPORAL_RESTRICTION: 0
OD_INFERIOR_TEMPORAL_RESTRICTION: 0
OD_INFERIOR_NASAL_RESTRICTION: 0
OS_INFERIOR_NASAL_RESTRICTION: 0
OS_NORMAL: 1
OD_SUPERIOR_NASAL_RESTRICTION: 0

## 2025-04-03 ASSESSMENT — REFRACTION_MANIFEST
OS_CYLINDER: -0.75
OS_AXIS: 010
OS_ADD: +1.50
OS_CYLINDER: -0.75
OS_AXIS: 165
OD_CYLINDER: -0.50
OD_SPHERE: +0.25
OD_SPHERE: +0.25
OD_AXIS: 165
OS_SPHERE: +0.50
OS_ADD: +1.75
OS_CYLINDER: -0.50
OD_AXIS: 160
OS_SPHERE: +0.25
OD_ADD: +1.50
OD_CYLINDER: -0.25
OD_SPHERE: +0.50
OD_CYLINDER: -0.50
OD_AXIS: 160
METHOD_AUTOREFRACTION: 1
OS_AXIS: 165
OS_SPHERE: +0.50
OD_ADD: +1.75

## 2025-04-03 ASSESSMENT — ENCOUNTER SYMPTOMS
ALLERGIC/IMMUNOLOGIC NEGATIVE: 0
CONSTITUTIONAL NEGATIVE: 0
CARDIOVASCULAR NEGATIVE: 0
MUSCULOSKELETAL NEGATIVE: 0
GASTROINTESTINAL NEGATIVE: 0
EYES NEGATIVE: 0
NEUROLOGICAL NEGATIVE: 0
HEMATOLOGIC/LYMPHATIC NEGATIVE: 0
RESPIRATORY NEGATIVE: 0
ENDOCRINE NEGATIVE: 0
PSYCHIATRIC NEGATIVE: 0

## 2025-04-03 ASSESSMENT — EXTERNAL EXAM - LEFT EYE: OS_EXAM: NORMAL

## 2025-04-03 ASSESSMENT — VISUAL ACUITY
OS_SC: 20/25
OS_SC+: -1
OD_SC+: -1
OD_SC: 20/30
METHOD: SNELLEN - LINEAR

## 2025-04-03 ASSESSMENT — TONOMETRY
IOP_METHOD: GOLDMANN APPLANATION
OS_IOP_MMHG: 10
OD_IOP_MMHG: 12

## 2025-04-03 ASSESSMENT — CUP TO DISC RATIO
OS_RATIO: 0.6
OD_RATIO: 0.6

## 2025-04-03 ASSESSMENT — SLIT LAMP EXAM - LIDS
COMMENTS: NORMAL
COMMENTS: NORMAL

## 2025-04-03 ASSESSMENT — EXTERNAL EXAM - RIGHT EYE: OD_EXAM: NORMAL

## 2025-04-03 NOTE — PROGRESS NOTES
Assessment/Plan   Problem List Items Addressed This Visit       Hyperopia of both eyes with astigmatism and presbyopia - Primary     Mild Rx. Anterior and posterior ocular health WNL OU.  Pt educated on findings. Release optional Rx for full time wear. Discussed adaptation. Discussed options for OTC readers for near vs bifocals/progressive lenses. Monitor annually. Pt voiced understanding.

## 2025-04-03 NOTE — ASSESSMENT & PLAN NOTE
No diabetic retinopathy OU. No macular edema OU.  Pt educated on findings and importance of maintaining a low A1C and FBS to prevent vision loss from diabetic retinopathy. Continue care with PCP as directed. Monitor annually with dilated fundus examination. Pt voiced understanding.

## 2025-04-03 NOTE — ASSESSMENT & PLAN NOTE
Mild Rx. Anterior and posterior ocular health WNL OU.  Pt educated on findings. Release optional Rx for full time wear. Discussed adaptation. Discussed options for OTC readers for near vs bifocals/progressive lenses. Monitor annually. Pt voiced understanding.

## 2025-04-14 ENCOUNTER — APPOINTMENT (OUTPATIENT)
Dept: BEHAVIORAL HEALTH | Facility: CLINIC | Age: 45
End: 2025-04-14
Payer: COMMERCIAL

## 2025-04-14 DIAGNOSIS — F25.0 SCHIZOAFFECTIVE DISORDER, BIPOLAR TYPE (MULTI): ICD-10-CM

## 2025-04-14 PROCEDURE — 90834 PSYTX W PT 45 MINUTES: CPT | Performed by: PSYCHOLOGIST

## 2025-04-14 NOTE — PROGRESS NOTES
Dictated with Dragon [employer] software  11 AM [id number] Indiv Psych for Schizoaffective Dx, Bipolar Type (45 MIN, [id number])  Virtual appt. Supportive Therapy.  Patient, not seen in several weeks.  She reported that she is going to reach out to her pharmacologist and see if she can get some of her meds changed.  Continues to be frustrated with her daughter who is not willing to take psychiatric medications even though she needs them.  She reported that her mother and stepfather are moving into a home and questions of this.  Also, her sister who moved closer to her mother's to get some help we will be frustrated that mother is moving.  Still waiting to hear from  regarding getting [facility] therapy for Tavon.  Spent most the time discussing her frustration in managing his behavior.  Sees him as manipulative.  She reported that her oldest son, Murphy, is doing better in school and is lined up to work for the city this summer.  Try to get him situated. Next: 1 week.

## 2025-04-16 ENCOUNTER — APPOINTMENT (OUTPATIENT)
Dept: BEHAVIORAL HEALTH | Facility: CLINIC | Age: 45
End: 2025-04-16
Payer: COMMERCIAL

## 2025-04-16 DIAGNOSIS — F32.A DEPRESSION, UNSPECIFIED DEPRESSION TYPE: ICD-10-CM

## 2025-04-16 DIAGNOSIS — F25.0 SCHIZOAFFECTIVE DISORDER, BIPOLAR TYPE (MULTI): ICD-10-CM

## 2025-04-16 DIAGNOSIS — G47.00 INSOMNIA, UNSPECIFIED TYPE: ICD-10-CM

## 2025-04-16 DIAGNOSIS — F43.9 TRAUMA AND STRESSOR-RELATED DISORDER: ICD-10-CM

## 2025-04-16 DIAGNOSIS — F98.8 ATTENTION DEFICIT DISORDER, UNSPECIFIED TYPE: ICD-10-CM

## 2025-04-16 DIAGNOSIS — F41.1 GAD (GENERALIZED ANXIETY DISORDER): ICD-10-CM

## 2025-04-16 PROCEDURE — 99213 OFFICE O/P EST LOW 20 MIN: CPT | Performed by: CLINICAL NURSE SPECIALIST

## 2025-04-16 RX ORDER — BUPROPION HYDROCHLORIDE 150 MG/1
150 TABLET ORAL EVERY MORNING
Qty: 90 TABLET | Refills: 1 | Status: SHIPPED | OUTPATIENT
Start: 2025-04-16 | End: 2025-07-15

## 2025-04-16 NOTE — PROGRESS NOTES
Outpatient Psychiatry      Subjective   Luca Delgado, a 45 y.o. female, presents for a follow up for outpatient psychiatry /medication management appointment for an audio and video virtual appointment as an established patient , she was at home for the appointment .      Virtual or Telephone Consent     An interactive audio and video telecommunication system which permits real time communications between the patient (at the originating site) and provider (at the distant site) was utilized to provide this telehealth service.   Verbal consent was requested and obtained from Luca Delgado on this date, 4/16/25 for a telehealth visit.       Patient joined session at 7:50 am today after being called to see if she was keeping the appointment today     Psychiatric Diagnoses:   Schizoaffective disorder  bipolar type primary diagnoses moderate F 25.0  Insomnia unspecified type G47.00 moderate   Trauma and stressor-related disorder moderate F43.9  ESTUARDO (generalized anxiety disorder) moderate F41.1  Attention deficit disorder unspecified mild F98.8      Treatment Plan/Recommendations: 4-6 weeks follow up , can continue self care and wellness efforts and maintain routine health screenings.can call  for treatment and scheduling concerns.     Follow-up plan was discussed with patient.  Psychotropic medications :  Continue benztropine 2 mg, twice a day for restlessness/eps, advised try 1 mg twice a day to decrease dry mouth   Continue Bupropion xl 300 mg daily each morning for depression and concentration and focus /add  Continue  lamotrigine 200 mg , each morning and 200 mg  tablet each night for schitzoaffective disorder bipolar type /mood stability   Continue ziprasidone 80 mg  twice a day for schitzoaffective disorder , bipolar type /mood stability      Review with patient: Treatment plan reviewed with the patient.  Medication risks/benefit reviewed with the patient     HPI:  reconciled and reviewed medication list  in the [facility] emr  She is tolerating psychotropic medication, has depressed mood and reports irritability at times   Has stressors that contribute to anxiety   She does not have much time to herself    Sleep is affected due to childcare responsibilities with her son , and anxiety   She deals with situations with her children that increase anxiety  Encouraged talking about her feelings   support provided , she speaks to her therapist about stressors    she acknowledged the need for regularly scheduled appointments for her own medical care , advised making a pcp appointment and an eye dr appointment     Reviewed meds options and she agrees to the plan as noted above   encouraged time for herself with self care even if it is very brief times at home   She says she has been working with her youngest sons school related to his needs      Psych ros and medical ros as noted above      Per medical record emr full diagnoses list /medical history :  Diagnosis    Chest congestion    Gastroesophageal reflux disease without esophagitis    Vitamin D deficiency    Chronic left shoulder pain    Abnormal thyroid function test    ADD (attention deficit disorder)    Anxiety disorder    Callus of foot    Depression    Diabetes mellitus due to underlying condition with hyperosmolarity without coma    Hair loss    Alopecia areata, unspecified    Asthma    Chest pain    Fatigue    History of metabolic syndrome    Hoarseness of voice    Hx of thyroid cancer    Hypertension, essential    Mediastinal lymphadenopathy    Metatarsalgia of both feet    Onychomycosis    Postoperative hypothyroidism    Schizoaffective disorder (Multi)    Sleep difficulties    Substance-related disorder (Multi)    Thyroid nodule    Tobacco use disorder    Vision changes    Left shoulder pain    Dental caries    History of malignant neoplasm of thyroid    Pre-ulcerative calluses    Trauma and stressor-related disorder    Tremor     Medical History        Past Medical  History:   Diagnosis Date    Acute upper respiratory infection, unspecified 12/08/2016     URI, acute    Cannabis abuse, uncomplicated       Marijuana abuse, continuous    Encounter for general adult medical examination without abnormal findings 08/12/2018     Routine history and physical examination of adult    Encounter for screening for respiratory tuberculosis 12/08/2016     Tuberculosis screening    Other abnormality of red blood cells 07/15/2019     Microcytosis    Other psychoactive substance use, unspecified with unspecified psychoactive substance-induced disorder       Substance-related disorder    Pain in right knee 01/11/2018     Bilateral knee pain    Personal history of malignant neoplasm of thyroid       History of thyroid cancer    Personal history of malignant neoplasm of thyroid 10/14/2021     History of malignant neoplasm of thyroid    Personal history of malignant neoplasm of thyroid 12/15/2016     History of thyroid cancer    Personal history of other diseases of the nervous system and sense organs 03/23/2016     History of seizure disorder    Personal history of other diseases of the respiratory system       History of allergic rhinitis    Personal history of other diseases of the respiratory system 08/06/2018     History of allergic rhinitis    Personal history of other drug therapy 12/08/2016     History of influenza vaccination    Personal history of other endocrine, nutritional and metabolic disease 08/06/2018     History of hypercalcemia    Personal history of other endocrine, nutritional and metabolic disease 08/02/2018     History of vitamin D deficiency    Personal history of other infectious and parasitic diseases 08/20/2016     History of onychomycosis    Personal history of other mental and behavioral disorders       History of affective disorder    Personal history of other specified conditions 07/12/2021     History of dysuria    Personal history of other specified conditions  04/13/2018     History of fatigue    Vitamin D deficiency, unspecified       Vitamin D deficiency         Surgical History:  Surgical History         Past Surgical History:   Procedure Laterality Date    THYROID SURGERY   08/19/2016     Thyroid Surgery         Family History:  Family History          Family History   Problem Relation Name Age of Onset    Other (Malignant neoplasm of breast) Other Aunt      Cancer Other Aunt      Diabetes Other Uncle         Social History     Socioeconomic History    Marital status: Single     Spouse name: Not on file    Number of children: Not on file    Years of education: Not on file    Highest education level: Not on file   Occupational History    Not on file   Tobacco Use    Smoking status: Every Day     Types: Cigarettes     Passive exposure: Past    Smokeless tobacco: Former   Substance and Sexual Activity    Alcohol use: Yes    Drug use: Never    Sexual activity: Not on file   Other Topics Concern    Not on file   Social History Narrative    Not on file     Social Drivers of Health     Financial Resource Strain: Not on file   Food Insecurity: Not on file   Transportation Needs: High Risk (5/15/2024)    Received from [facility] SDOH Screening     Has lack of transportation kept you from medical appointments, meetings, work or from getting things needed for daily living? choose all that apply.: Yes, it has kept me from non-medical meetings, appointments, work or from getting things that i need     Has lack of transportation kept you from medical appointments, meetings, work or from getting things needed for daily living? choose all that apply.: Yes, it has kept me from medical appointments or from getting my medications   Physical Activity: Not on file   Stress: Not on file   Social Connections: Not on file   Intimate Partner Violence: Not on file   Housing Stability: Not on file     Vitals:  There were no vitals filed for this visit.    Reba Stauffer, APRN-CNS

## 2025-04-23 ENCOUNTER — APPOINTMENT (OUTPATIENT)
Dept: BEHAVIORAL HEALTH | Facility: CLINIC | Age: 45
End: 2025-04-23
Payer: COMMERCIAL

## 2025-05-06 DIAGNOSIS — E03.9 HYPOTHYROIDISM, UNSPECIFIED TYPE: ICD-10-CM

## 2025-05-07 ENCOUNTER — APPOINTMENT (OUTPATIENT)
Dept: BEHAVIORAL HEALTH | Facility: CLINIC | Age: 45
End: 2025-05-07
Payer: COMMERCIAL

## 2025-05-07 RX ORDER — LEVOTHYROXINE SODIUM 150 UG/1
150 TABLET ORAL DAILY
Qty: 90 TABLET | Refills: 0 | Status: SHIPPED | OUTPATIENT
Start: 2025-05-07 | End: 2026-05-07

## 2025-05-08 ENCOUNTER — TELEMEDICINE (OUTPATIENT)
Dept: BEHAVIORAL HEALTH | Facility: CLINIC | Age: 45
End: 2025-05-08
Payer: COMMERCIAL

## 2025-05-08 DIAGNOSIS — F41.9 ANXIETY: ICD-10-CM

## 2025-05-08 DIAGNOSIS — F25.0 SCHIZOAFFECTIVE DISORDER, BIPOLAR TYPE (MULTI): ICD-10-CM

## 2025-05-08 DIAGNOSIS — F32.A DEPRESSION, UNSPECIFIED DEPRESSION TYPE: ICD-10-CM

## 2025-05-08 DIAGNOSIS — F98.8 ATTENTION DEFICIT DISORDER, UNSPECIFIED TYPE: ICD-10-CM

## 2025-05-08 DIAGNOSIS — G47.00 INSOMNIA, UNSPECIFIED TYPE: ICD-10-CM

## 2025-05-08 DIAGNOSIS — G25.89 COMBINED PYRAMIDAL-EXTRAPYRAMIDAL SYNDROME: ICD-10-CM

## 2025-05-08 DIAGNOSIS — F41.1 GAD (GENERALIZED ANXIETY DISORDER): ICD-10-CM

## 2025-05-08 DIAGNOSIS — F43.9 TRAUMA AND STRESSOR-RELATED DISORDER: ICD-10-CM

## 2025-05-08 DIAGNOSIS — F33.1 DEPRESSION, MAJOR, RECURRENT, MODERATE: ICD-10-CM

## 2025-05-08 PROCEDURE — 99213 OFFICE O/P EST LOW 20 MIN: CPT | Performed by: CLINICAL NURSE SPECIALIST

## 2025-05-08 RX ORDER — ESCITALOPRAM OXALATE 5 MG/1
5 TABLET ORAL DAILY
Qty: 90 TABLET | Refills: 0 | Status: SHIPPED | OUTPATIENT
Start: 2025-05-08 | End: 2025-08-06

## 2025-05-08 RX ORDER — BENZTROPINE MESYLATE 2 MG/1
2 TABLET ORAL 2 TIMES DAILY
Qty: 180 TABLET | Refills: 0 | Status: SHIPPED | OUTPATIENT
Start: 2025-05-08 | End: 2025-08-06

## 2025-05-08 RX ORDER — BUPROPION HYDROCHLORIDE 150 MG/1
150 TABLET ORAL EVERY MORNING
Qty: 90 TABLET | Refills: 0 | Status: SHIPPED | OUTPATIENT
Start: 2025-05-08 | End: 2025-08-06

## 2025-05-08 RX ORDER — ZIPRASIDONE HYDROCHLORIDE 80 MG/1
80 CAPSULE ORAL
Qty: 180 CAPSULE | Refills: 0 | Status: SHIPPED | OUTPATIENT
Start: 2025-05-08 | End: 2025-08-06

## 2025-05-08 RX ORDER — BUPROPION HYDROCHLORIDE 300 MG/1
300 TABLET ORAL
Qty: 90 TABLET | Refills: 0 | Status: SHIPPED | OUTPATIENT
Start: 2025-05-08 | End: 2025-08-06

## 2025-05-08 RX ORDER — LAMOTRIGINE 200 MG/1
TABLET ORAL
Qty: 180 TABLET | Refills: 0 | Status: SHIPPED | OUTPATIENT
Start: 2025-05-08

## 2025-05-08 NOTE — PROGRESS NOTES
Outpatient Psychiatry      Subjective   Luca Delgado, a 45 y.o. female,  presents for a follow up for outpatient psychiatry /medication management appointment for an audio and video virtual appointment as an established patient , she was at home for the appointment .      Virtual or Telephone Consent     An interactive audio and video telecommunication system which permits real time communications between the patient (at the originating site) and provider (at the distant site) was utilized to provide this telehealth service.   Verbal consent was requested and obtained from Luca Delgado on this date, 5/8/25 for a telehealth visit.          Psychiatric Diagnoses:   Schizoaffective disorder  bipolar type primary diagnoses moderate F 25.0  Insomnia unspecified type G47.00 moderate   Trauma and stressor-related disorder moderate F43.9  ESTUARDO (generalized anxiety disorder) moderate F41.1  Attention deficit disorder unspecified mild F98.8      Treatment Plan/Recommendations: 4-6 weeks follow up , can continue self care and wellness efforts and maintain routine health screenings.can call  for treatment and scheduling concerns.     Follow-up plan was discussed with patient.  Psychotropic medications :  Continue benztropine 2 mg, twice a day for restlessness/eps, advised try 1 mg twice a day to decrease dry mouth   Continue Bupropion xl 300 mg daily and 150 mg , daily each morning for depression and concentration and focus   Continue  lamotrigine 200 mg , each morning and 200 mg  tablet each night for schitzoaffective disorder bipolar type /mood stability   Continue ziprasidone 80 mg  twice a day for schitzoaffective disorder , bipolar type /mood stability   Add escitalopram 5 mg ,daily for anxiety and depression      Review with patient: Treatment plan reviewed with the patient.  Medication risks/benefit reviewed with the patient     HPI:  reconciled and reviewed medication list in the [facility] emr  She is  tolerating psychotropic medication, has depressed mood and reports irritability at times   Has stressors that contribute to anxiety   She does not have much time to herself    Sleep is affected due to childcare responsibilities with her son , and anxiety   She deals with situations with her children that increase anxiety  Encouraged talking about her feelings   support provided , she speaks to her therapist about stressors    she acknowledged the need for regularly scheduled appointments for her own medical care , advised making a pcp appointment and an eye dr appointment     Reviewed meds options and she agrees to the plan as noted above   encouraged time for herself with self care even if it is very brief times at home   She says she has been working with her youngest sons school related to his needs      Psych ros and medical ros as noted above        Social History     Socioeconomic History    Marital status: Single     Spouse name: Not on file    Number of children: Not on file    Years of education: Not on file    Highest education level: Not on file   Occupational History    Not on file   Tobacco Use    Smoking status: Every Day     Types: Cigarettes     Passive exposure: Past    Smokeless tobacco: Former   Substance and Sexual Activity    Alcohol use: Yes    Drug use: Never    Sexual activity: Not on file   Other Topics Concern    Not on file   Social History Narrative    Not on file     Social Drivers of Health     Financial Resource Strain: Not on file   Food Insecurity: Not on file   Transportation Needs: High Risk (5/15/2024)    Received from [facility] SDOH Screening     Has lack of transportation kept you from medical appointments, meetings, work or from getting things needed for daily living? choose all that apply.: Yes, it has kept me from non-medical meetings, appointments, work or from getting things that i need     Has lack of transportation kept you from medical appointments,  meetings, work or from getting things needed for daily living? choose all that apply.: Yes, it has kept me from medical appointments or from getting my medications   Physical Activity: Not on file   Stress: Not on file   Social Connections: Not on file   Intimate Partner Violence: Not on file   Housing Stability: Not on file     Vitals:  There were no vitals filed for this visit.    Reba Stauffer, APRN-CNS

## 2025-05-15 ENCOUNTER — APPOINTMENT (OUTPATIENT)
Dept: BEHAVIORAL HEALTH | Facility: CLINIC | Age: 45
End: 2025-05-15
Payer: COMMERCIAL

## 2025-05-19 ENCOUNTER — APPOINTMENT (OUTPATIENT)
Dept: BEHAVIORAL HEALTH | Facility: CLINIC | Age: 45
End: 2025-05-19
Payer: COMMERCIAL

## 2025-05-21 ENCOUNTER — APPOINTMENT (OUTPATIENT)
Dept: BEHAVIORAL HEALTH | Facility: CLINIC | Age: 45
End: 2025-05-21
Payer: COMMERCIAL

## 2025-05-21 DIAGNOSIS — F25.0 SCHIZOAFFECTIVE DISORDER, BIPOLAR TYPE (MULTI): ICD-10-CM

## 2025-05-21 PROCEDURE — 90834 PSYTX W PT 45 MINUTES: CPT | Performed by: PSYCHOLOGIST
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
## 2025-05-28 ENCOUNTER — TELEMEDICINE (OUTPATIENT)
Dept: BEHAVIORAL HEALTH | Facility: CLINIC | Age: 45
End: 2025-05-28
Payer: COMMERCIAL

## 2025-05-28 DIAGNOSIS — F25.0 SCHIZOAFFECTIVE DISORDER, BIPOLAR TYPE (MULTI): ICD-10-CM

## 2025-05-28 PROCEDURE — 90837 PSYTX W PT 60 MINUTES: CPT | Performed by: PSYCHOLOGIST
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
## 2025-06-02 ENCOUNTER — APPOINTMENT (OUTPATIENT)
Dept: BEHAVIORAL HEALTH | Facility: CLINIC | Age: 45
End: 2025-06-02
Payer: COMMERCIAL

## 2025-06-06 ENCOUNTER — APPOINTMENT (OUTPATIENT)
Dept: BEHAVIORAL HEALTH | Facility: CLINIC | Age: 45
End: 2025-06-06
Payer: COMMERCIAL

## 2025-06-11 ENCOUNTER — APPOINTMENT (OUTPATIENT)
Dept: BEHAVIORAL HEALTH | Facility: CLINIC | Age: 45
End: 2025-06-11
Payer: COMMERCIAL

## 2025-06-13 ENCOUNTER — APPOINTMENT (OUTPATIENT)
Dept: BEHAVIORAL HEALTH | Facility: CLINIC | Age: 45
End: 2025-06-13
Payer: COMMERCIAL

## 2025-06-25 ENCOUNTER — APPOINTMENT (OUTPATIENT)
Dept: BEHAVIORAL HEALTH | Facility: CLINIC | Age: 45
End: 2025-06-25
Payer: COMMERCIAL

## 2025-06-25 DIAGNOSIS — F25.0 SCHIZOAFFECTIVE DISORDER, BIPOLAR TYPE (MULTI): ICD-10-CM

## 2025-06-25 DIAGNOSIS — F41.1 GAD (GENERALIZED ANXIETY DISORDER): ICD-10-CM

## 2025-06-25 DIAGNOSIS — G47.00 INSOMNIA, UNSPECIFIED TYPE: ICD-10-CM

## 2025-06-25 DIAGNOSIS — F98.8 ATTENTION DEFICIT DISORDER, UNSPECIFIED TYPE: ICD-10-CM

## 2025-06-25 DIAGNOSIS — F43.9 TRAUMA AND STRESSOR-RELATED DISORDER: ICD-10-CM

## 2025-06-25 PROCEDURE — 99213 OFFICE O/P EST LOW 20 MIN: CPT | Performed by: CLINICAL NURSE SPECIALIST

## 2025-06-25 NOTE — PROGRESS NOTES
Outpatient Psychiatry      Subjective   Luca Delgado, a 45 y.o. female,  presents for a follow up for outpatient psychiatry /medication management appointment for an audio and video virtual appointment as an established patient , she was at home for the appointment .      Virtual or Telephone Consent     An interactive audio and video telecommunication system which permits real time communications between the patient (at the originating site) and provider (at the distant site) was utilized to provide this telehealth service.   Verbal consent was requested and obtained from Luca Delgado on this date, 6/25/25 for a telehealth visit.           Psychiatric Diagnoses:   Schizoaffective disorder  bipolar type primary diagnoses moderate F 25.0 primary   Trauma and stressor-related disorder moderate F43.9  ESTUARDO (generalized anxiety disorder) moderate F41.1 secondary   Insomnia unspecified type G 47.00  Attention deficit disorder unspecified mild F98.8        Treatment Plan/Recommendations: 4-6 weeks follow up , can continue self care and wellness efforts and maintain routine health screenings.can call  for treatment and scheduling concerns.     Follow-up plan was discussed with patient.  Psychotropic medications :  Continue benztropine 2 mg, twice a day for restlessness/eps   Continue Bupropion xl 300 mg daily and 150 mg , daily each morning for depression and concentration and focus   Continue  lamotrigine 200 mg , each morning and 200 mg  tablet each night for schitzoaffective disorder bipolar type /mood stability   Continue ziprasidone 80 mg  twice a day for schitzoaffective disorder , bipolar type /mood stability   discontinue escitalopram 5 mg ,daily for anxiety and depression , she stopped this a week ago , she had suicidal thoughts on this      Review with patient: Treatment plan reviewed with the patient.  Medication risks/benefit reviewed with the patient     HPI:  reconciled and reviewed medication  list in the [facility] emr  She is tolerating psychotropic medication, has depressed mood and reports irritability at times   Has stressors that contribute to anxiety   Encouraged talking about her feelings   support provided , she speaks to her therapist about stressors , though she has not had therapy since may , she plans to set this up      No psychoses   No thoughts of harming herself or others   She says her son's father is spending time with her son this summer   No thoughts of harming herself or others    Sleep is ok recently   Can attend to day to day activities      Psych ros and medical ros as noted above        Social History     Socioeconomic History    Marital status: Single     Spouse name: Not on file    Number of children: Not on file    Years of education: Not on file    Highest education level: Not on file   Occupational History    Not on file   Tobacco Use    Smoking status: Every Day     Current packs/day: 0.50     Average packs/day: 0.5 packs/day for 13.4 years (6.7 ttl pk-yrs)     Types: Cigarettes     Start date: 1/25/2012     Passive exposure: Past    Smokeless tobacco: Current   Substance and Sexual Activity    Alcohol use: Not Currently    Drug use: Not Currently    Sexual activity: Not Currently     Partners: Female     Birth control/protection: Condom Male, Condom Female   Other Topics Concern    Not on file   Social History Narrative    Not on file     Social Drivers of Health     Financial Resource Strain: Not on file   Food Insecurity: Not on file   Transportation Needs: High Risk (5/15/2024)    Received from [facility] SDOH Screening     Has lack of transportation kept you from medical appointments, meetings, work or from getting things needed for daily living? choose all that apply.: Yes, it has kept me from non-medical meetings, appointments, work or from getting things that i need     Has lack of transportation kept you from medical appointments, meetings, work or from  getting things needed for daily living? choose all that apply.: Yes, it has kept me from medical appointments or from getting my medications   Physical Activity: Not on file   Stress: Not on file   Social Connections: Not on file   Intimate Partner Violence: Not on file   Housing Stability: Not on file     Vitals:  There were no vitals filed for this visit.    Reba Stauffer, APRN-CNS

## 2025-06-30 DIAGNOSIS — K21.9 GASTROESOPHAGEAL REFLUX DISEASE WITHOUT ESOPHAGITIS: ICD-10-CM

## 2025-06-30 RX ORDER — OMEPRAZOLE 40 MG/1
40 CAPSULE, DELAYED RELEASE ORAL
Qty: 90 CAPSULE | Refills: 0 | Status: SHIPPED | OUTPATIENT
Start: 2025-06-30 | End: 2025-09-28

## 2025-07-07 ENCOUNTER — APPOINTMENT (OUTPATIENT)
Dept: BEHAVIORAL HEALTH | Facility: CLINIC | Age: 45
End: 2025-07-07
Payer: COMMERCIAL

## 2025-07-07 DIAGNOSIS — F25.0 SCHIZOAFFECTIVE DISORDER, BIPOLAR TYPE (MULTI): ICD-10-CM

## 2025-07-07 PROCEDURE — 90837 PSYTX W PT 60 MINUTES: CPT | Performed by: PSYCHOLOGIST
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
## 2025-07-11 ENCOUNTER — APPOINTMENT (OUTPATIENT)
Dept: BEHAVIORAL HEALTH | Facility: CLINIC | Age: 45
End: 2025-07-11
Payer: COMMERCIAL

## 2025-07-21 ENCOUNTER — APPOINTMENT (OUTPATIENT)
Dept: BEHAVIORAL HEALTH | Facility: CLINIC | Age: 45
End: 2025-07-21
Payer: COMMERCIAL

## 2025-07-21 DIAGNOSIS — F25.0 SCHIZOAFFECTIVE DISORDER, BIPOLAR TYPE (MULTI): ICD-10-CM

## 2025-07-21 PROCEDURE — 90837 PSYTX W PT 60 MINUTES: CPT | Performed by: PSYCHOLOGIST

## 2025-07-21 NOTE — PROGRESS NOTES
Dictated with Dragon Medical One software  4 PM 43837 Indiv Psych for Schizoaffective Dx, Bipolar Type (60 MIN, [phone]).   Virtual appt. Supportive Therapy. Patient has been stressed out and feeling overwhelmed.  She reached out to sons father through her daughter and he was finally returned home.  Patient has been feeling overwhelmed in managing his behavior.  Trying to get him on a routine with grooming and showering since he will be starting high school.  She is also had some discussions with his knee on  and he will be starting boxing soon.   she has been discussed all year schooling but it is cost prohibitive.  May consider [facility] therapy again.  She reported her son has been depressed and expressed some suicidal ideation.  Trying to get him lined up for therapy services.  Patient expressed some concerns about her mother who has had a history of alcohol abuse in the past.  Her daughter will be trying to go to school at [employer] in the fall. Next: 1 week.

## 2025-07-28 ENCOUNTER — APPOINTMENT (OUTPATIENT)
Dept: BEHAVIORAL HEALTH | Facility: CLINIC | Age: 45
End: 2025-07-28
Payer: COMMERCIAL

## 2025-07-30 ENCOUNTER — TELEMEDICINE (OUTPATIENT)
Dept: BEHAVIORAL HEALTH | Facility: CLINIC | Age: 45
End: 2025-07-30
Payer: COMMERCIAL

## 2025-07-30 DIAGNOSIS — F43.9 TRAUMA AND STRESSOR-RELATED DISORDER: ICD-10-CM

## 2025-07-30 DIAGNOSIS — F41.1 GAD (GENERALIZED ANXIETY DISORDER): ICD-10-CM

## 2025-07-30 DIAGNOSIS — G47.00 INSOMNIA, UNSPECIFIED TYPE: ICD-10-CM

## 2025-07-30 DIAGNOSIS — F98.8 ATTENTION DEFICIT DISORDER, UNSPECIFIED TYPE: ICD-10-CM

## 2025-07-30 DIAGNOSIS — F25.0 SCHIZOAFFECTIVE DISORDER, BIPOLAR TYPE (MULTI): Primary | ICD-10-CM

## 2025-07-30 DIAGNOSIS — G47.9 SLEEP DIFFICULTIES: ICD-10-CM

## 2025-07-30 RX ORDER — HYDROXYZINE HYDROCHLORIDE 10 MG/1
TABLET, FILM COATED ORAL
Qty: 60 TABLET | Refills: 1 | Status: SHIPPED | OUTPATIENT
Start: 2025-07-30

## 2025-07-30 NOTE — PROGRESS NOTES
Outpatient Psychiatry      Subjective   Luca Delgado, a 45 y.o. female,  presents for a follow up for outpatient psychiatry /medication management appointment for an audio and video virtual appointment as an established patient , she was at home for the appointment .      Virtual or Telephone Consent     An interactive audio and video telecommunication system which permits real time communications between the patient (at the originating site) and provider (at the distant site) was utilized to provide this telehealth service.   Verbal consent was requested and obtained from Luca Delgado on this date, 7/30/25 for a telehealth visit.           Psychiatric Diagnoses:   Schizoaffective disorder  bipolar type primary diagnoses moderate F 25.0 primary   Trauma and stressor-related disorder moderate F43.9  ESTUARDO (generalized anxiety disorder) moderate F41.1 secondary   Insomnia unspecified type G 47.00  Attention deficit disorder unspecified mild F98.8         Treatment Plan/Recommendations: 4-6 weeks follow up , can continue self care and wellness efforts and maintain routine health screenings.can call  for treatment and scheduling concerns.     Follow-up plan was discussed with patient.  Psychotropic medications :  Continue benztropine 2 mg, twice a day for restlessness/eps   Continue Bupropion xl 300 mg daily each morning for depression and concentration and focus   Continue  lamotrigine 200 mg , each morning and 200 mg  tablet each night for schitzoaffective disorder bipolar type /mood stability   Continue ziprasidone 80 mg  twice a day for schitzoaffective disorder , bipolar type /mood stability  Add hydroxyzine 10 mg , 1-2 tablets at bedtime as needed for anxiety and sleep       Review with patient: Treatment plan reviewed with the patient.  Medication risks/benefit reviewed with the patient     HPI:  reconciled and reviewed medication list in the [facility] emr  She is tolerating psychotropic medication, has  depressed mood and reports irritability at times , with the higher dose of bupropion xl she had irritability and she lowered this on her own and is taking 300 mg , xl daily   Has stressors that contribute to anxiety   Encouraged talking about her feelings   support provided , she speaks to her therapist about stressors       No psychoses   No thoughts of harming herself or others   No thoughts of harming herself or others    Sleep is ok recently     Meds: tried Prozac, Zoloft, Paxil, Luvox, lithium - side effect? Depakote, Neurontin? Zyprexa, Seroquel - too sedation, Geodon - too sedation, restless ness reported when dose increased ; Latuda - not sure; Vraylar, Abilify ( not effective ) , clonazepam , haldol  (weight gain ) , ritalin   FHx: mother - depression; sister - depression; no fx of bipolar disorder; mother - had alcohol problem. fx of SA  no fx of sudden death   great uncle and uncle - heart attack   no fx of stroke  uncle - DM  PPHx: hospitalized 5 times when she was young due to depression; no hospitalization for years; SA - twice with OD on meds and paint with she was young. no violence toward others. first depression started 15 yo, no trigger for depression, but she was an outsider. Since then, she had periods of feeling when she was young because she did not have much responsibility. endorsed sx consistent with manic episodes including persistently elevated mood and increased energy, decreased need for sleep, racing thoughts, more talkative, easily distracted, spent more money, and risky sexual behaviors. the longest was about 2 weeks. the first episode was 15 yo. the last was several years ago.           Social History     Socioeconomic History    Marital status: Single     Spouse name: Not on file    Number of children: Not on file    Years of education: Not on file    Highest education level: Not on file   Occupational History    Not on file   Tobacco Use    Smoking status: Every Day     Current  packs/day: 0.50     Average packs/day: 0.5 packs/day for 13.5 years (6.8 ttl pk-yrs)     Types: Cigarettes     Start date: 1/25/2012     Passive exposure: Past    Smokeless tobacco: Current   Substance and Sexual Activity    Alcohol use: Not Currently    Drug use: Not Currently    Sexual activity: Not Currently     Partners: Female     Birth control/protection: Condom Male, Condom Female   Other Topics Concern    Not on file   Social History Narrative    Not on file     Social Drivers of Health     Financial Resource Strain: High Risk (6/30/2025)    Received from [facility] SDOH Screening     In the past year, have you been unable to get any of the following when you really needed them? choose all that apply.: Clothing   Food Insecurity: Not on file   Transportation Needs: High Risk (5/15/2024)    Received from [facility] SDOH Screening     Has lack of transportation kept you from medical appointments, meetings, work or from getting things needed for daily living? choose all that apply.: Yes, it has kept me from non-medical meetings, appointments, work or from getting things that i need     Has lack of transportation kept you from medical appointments, meetings, work or from getting things needed for daily living? choose all that apply.: Yes, it has kept me from medical appointments or from getting my medications   Physical Activity: Not on file   Stress: Not on file   Social Connections: Not on file   Intimate Partner Violence: Not on file   Housing Stability: Not on file     Vitals:  There were no vitals filed for this visit.    Reba Stauffer, APRN-CNS     meetings, appointments, work or from getting things that i need     Has lack of transportation kept you from medical appointments, meetings, work or from getting things needed for daily living? choose all that apply.: Yes, it has kept me from medical appointments or from getting my medications   Physical Activity: Not on file   Stress: Not on file   Social Connections: Not on file   Intimate Partner Violence: Not on file   Housing Stability: Not on file     Vitals:  There were no vitals filed for this visit.    Reba Stauffer, APRN-CNS

## 2025-07-31 ENCOUNTER — TELEMEDICINE (OUTPATIENT)
Dept: BEHAVIORAL HEALTH | Facility: CLINIC | Age: 45
End: 2025-07-31
Payer: COMMERCIAL

## 2025-07-31 DIAGNOSIS — F25.0 SCHIZOAFFECTIVE DISORDER, BIPOLAR TYPE (MULTI): ICD-10-CM

## 2025-07-31 PROCEDURE — 90834 PSYTX W PT 45 MINUTES: CPT | Performed by: PSYCHOLOGIST

## 2025-07-31 NOTE — PROGRESS NOTES
"Dictated with Dragon [employer] One software  3 PM [id number] Indiv Psych for Schizoaffective Dx, Bipolar Type (45 MIN, [phone]).   Virtual appt. Supportive Therapy.  Patient missed Monday meeting and rescheduled for today.  Discussed difficulties she is having with Tavon's father.  Patient has had some concerns about her medication and discussed with her pharmacologist.  She wants something added to the Geodon.  Patient has been taking Tavon to a resource, boxing classes and other possible [facility] activities.  She appreciated the fact that he tried out the violin and play basketball with other kids.  Much of the time was spent discussing history with both mother which she has had a genoveva relationship with.  Discussed history from when she was 15 when 1 of mothers friends wanted to adopt her and mother said now.  Imagines that her life would have turned on a lot better at that happened.  \"I never had opportunities to do things.\"  Attended [employer] for a few years.  She reported that she was cutting around that time and bored with classes and then transferred to [employer] behavioral [employer].  Reported that when she came back home after a suicide attempt at age 17-18 after taking pills mother would not give her phone number.  Mother had moved in with current partner and she discussed the conflict she has had with mom's partner over the many years since.  She described mother's  as vulgar.  Discussed.  Time when mother would overmedicate her and she felt doped up and also had apparent tardive dyskinesia and other symptoms.  Her mother would tell her that she is jealous of her daughter.  Patient had an session early because of getting number of phone calls that she thought might have been emergent. Next: 2 weeks.  "

## 2025-08-04 DIAGNOSIS — E03.9 HYPOTHYROIDISM, UNSPECIFIED TYPE: ICD-10-CM

## 2025-08-04 RX ORDER — LEVOTHYROXINE SODIUM 150 UG/1
150 TABLET ORAL DAILY
Qty: 30 TABLET | Refills: 0 | Status: SHIPPED | OUTPATIENT
Start: 2025-08-04

## 2025-08-09 PROBLEM — G47.00 INSOMNIA: Status: ACTIVE | Noted: 2025-08-09

## 2025-08-11 ENCOUNTER — APPOINTMENT (OUTPATIENT)
Dept: BEHAVIORAL HEALTH | Facility: CLINIC | Age: 45
End: 2025-08-11
Payer: COMMERCIAL

## 2025-08-25 ENCOUNTER — APPOINTMENT (OUTPATIENT)
Dept: BEHAVIORAL HEALTH | Facility: CLINIC | Age: 45
End: 2025-08-25
Payer: COMMERCIAL

## 2025-08-25 DIAGNOSIS — F25.0 SCHIZOAFFECTIVE DISORDER, BIPOLAR TYPE (MULTI): ICD-10-CM

## 2025-08-25 PROCEDURE — 90834 PSYTX W PT 45 MINUTES: CPT | Performed by: PSYCHOLOGIST

## 2025-08-28 ENCOUNTER — APPOINTMENT (OUTPATIENT)
Dept: BEHAVIORAL HEALTH | Facility: CLINIC | Age: 45
End: 2025-08-28
Payer: COMMERCIAL

## 2025-09-03 ENCOUNTER — APPOINTMENT (OUTPATIENT)
Dept: BEHAVIORAL HEALTH | Facility: CLINIC | Age: 45
End: 2025-09-03
Payer: COMMERCIAL

## 2025-09-03 ENCOUNTER — TELEMEDICINE (OUTPATIENT)
Dept: BEHAVIORAL HEALTH | Facility: CLINIC | Age: 45
End: 2025-09-03
Payer: COMMERCIAL

## 2025-09-03 DIAGNOSIS — F25.0 SCHIZOAFFECTIVE DISORDER, BIPOLAR TYPE (MULTI): Primary | ICD-10-CM

## 2025-09-03 DIAGNOSIS — G47.9 SLEEP DIFFICULTIES: ICD-10-CM

## 2025-09-03 DIAGNOSIS — F41.9 ANXIETY: ICD-10-CM

## 2025-09-03 DIAGNOSIS — G25.89 COMBINED PYRAMIDAL-EXTRAPYRAMIDAL SYNDROME: ICD-10-CM

## 2025-09-03 DIAGNOSIS — F41.1 GAD (GENERALIZED ANXIETY DISORDER): ICD-10-CM

## 2025-09-03 DIAGNOSIS — F43.9 TRAUMA AND STRESSOR-RELATED DISORDER: ICD-10-CM

## 2025-09-03 DIAGNOSIS — F98.8 ATTENTION DEFICIT DISORDER, UNSPECIFIED TYPE: ICD-10-CM

## 2025-09-03 RX ORDER — BUPROPION HYDROCHLORIDE 300 MG/1
300 TABLET ORAL
Qty: 90 TABLET | Refills: 0 | Status: SHIPPED | OUTPATIENT
Start: 2025-09-03 | End: 2025-12-02

## 2025-09-03 RX ORDER — BENZTROPINE MESYLATE 2 MG/1
2 TABLET ORAL 2 TIMES DAILY
Qty: 180 TABLET | Refills: 0 | Status: SHIPPED | OUTPATIENT
Start: 2025-09-03 | End: 2025-12-02

## 2025-09-03 RX ORDER — ZIPRASIDONE HYDROCHLORIDE 80 MG/1
80 CAPSULE ORAL
Qty: 180 CAPSULE | Refills: 0 | Status: SHIPPED | OUTPATIENT
Start: 2025-09-03 | End: 2025-12-02

## 2025-09-03 RX ORDER — HYDROXYZINE HYDROCHLORIDE 10 MG/1
TABLET, FILM COATED ORAL
Qty: 60 TABLET | Refills: 1 | Status: SHIPPED | OUTPATIENT
Start: 2025-09-03

## 2025-09-03 RX ORDER — LAMOTRIGINE 200 MG/1
TABLET ORAL
Qty: 180 TABLET | Refills: 0 | Status: SHIPPED | OUTPATIENT
Start: 2025-09-03

## 2025-10-15 ENCOUNTER — APPOINTMENT (OUTPATIENT)
Dept: BEHAVIORAL HEALTH | Facility: CLINIC | Age: 45
End: 2025-10-15
Payer: COMMERCIAL

## 2025-11-19 ENCOUNTER — APPOINTMENT (OUTPATIENT)
Dept: DERMATOLOGY | Facility: CLINIC | Age: 45
End: 2025-11-19
Payer: COMMERCIAL

## 2026-04-06 ENCOUNTER — APPOINTMENT (OUTPATIENT)
Dept: OPHTHALMOLOGY | Facility: CLINIC | Age: 46
End: 2026-04-06
Payer: COMMERCIAL